# Patient Record
Sex: FEMALE | Race: WHITE | Employment: FULL TIME | URBAN - METROPOLITAN AREA
[De-identification: names, ages, dates, MRNs, and addresses within clinical notes are randomized per-mention and may not be internally consistent; named-entity substitution may affect disease eponyms.]

---

## 2017-08-28 ENCOUNTER — OFFICE VISIT (OUTPATIENT)
Dept: OBGYN CLINIC | Age: 47
End: 2017-08-28
Payer: COMMERCIAL

## 2017-08-28 VITALS
SYSTOLIC BLOOD PRESSURE: 118 MMHG | DIASTOLIC BLOOD PRESSURE: 76 MMHG | HEIGHT: 68 IN | WEIGHT: 135 LBS | HEART RATE: 80 BPM | BODY MASS INDEX: 20.46 KG/M2

## 2017-08-28 DIAGNOSIS — Z01.419 WELL WOMAN EXAM: Primary | ICD-10-CM

## 2017-08-28 PROBLEM — Z00.00 ENCOUNTER FOR WELL ADULT EXAM WITHOUT ABNORMAL FINDINGS: Status: ACTIVE | Noted: 2017-08-28

## 2017-08-28 PROBLEM — Z28.21 REFUSED INFLUENZA VACCINE: Status: ACTIVE | Noted: 2017-08-28

## 2017-08-28 PROCEDURE — 99396 PREV VISIT EST AGE 40-64: CPT | Performed by: NURSE PRACTITIONER

## 2018-09-26 PROBLEM — Z00.00 ENCOUNTER FOR WELL ADULT EXAM WITHOUT ABNORMAL FINDINGS: Status: RESOLVED | Noted: 2017-08-28 | Resolved: 2018-09-26

## 2019-04-10 ENCOUNTER — OFFICE VISIT (OUTPATIENT)
Dept: OBGYN CLINIC | Age: 49
End: 2019-04-10
Payer: COMMERCIAL

## 2019-04-10 ENCOUNTER — HOSPITAL ENCOUNTER (OUTPATIENT)
Age: 49
Setting detail: SPECIMEN
Discharge: HOME | End: 2019-04-10
Payer: COMMERCIAL

## 2019-04-10 VITALS
HEIGHT: 68 IN | BODY MASS INDEX: 21.07 KG/M2 | WEIGHT: 139 LBS | DIASTOLIC BLOOD PRESSURE: 70 MMHG | RESPIRATION RATE: 18 BRPM | HEART RATE: 78 BPM | SYSTOLIC BLOOD PRESSURE: 114 MMHG

## 2019-04-10 DIAGNOSIS — Z01.419 WELL FEMALE EXAM WITH ROUTINE GYNECOLOGICAL EXAM: Primary | ICD-10-CM

## 2019-04-10 DIAGNOSIS — Z11.51 SPECIAL SCREENING EXAMINATION FOR HUMAN PAPILLOMAVIRUS (HPV): ICD-10-CM

## 2019-04-10 DIAGNOSIS — Z12.31 SCREENING MAMMOGRAM, ENCOUNTER FOR: ICD-10-CM

## 2019-04-10 DIAGNOSIS — Z12.39 SCREENING FOR BREAST CANCER: ICD-10-CM

## 2019-04-10 DIAGNOSIS — Z01.419 WELL FEMALE EXAM WITH ROUTINE GYNECOLOGICAL EXAM: ICD-10-CM

## 2019-04-10 DIAGNOSIS — Z80.3 FAMILY HISTORY OF BREAST CANCER IN FEMALE: ICD-10-CM

## 2019-04-10 DIAGNOSIS — R92.2 DENSE BREAST TISSUE ON MAMMOGRAM: ICD-10-CM

## 2019-04-10 PROCEDURE — 87624 HPV HI-RISK TYP POOLED RSLT: CPT

## 2019-04-10 PROCEDURE — 99396 PREV VISIT EST AGE 40-64: CPT | Performed by: NURSE PRACTITIONER

## 2019-04-10 PROCEDURE — G0145 SCR C/V CYTO,THINLAYER,RESCR: HCPCS

## 2019-04-10 ASSESSMENT — PATIENT HEALTH QUESTIONNAIRE - PHQ9
SUM OF ALL RESPONSES TO PHQ QUESTIONS 1-9: 0
SUM OF ALL RESPONSES TO PHQ9 QUESTIONS 1 & 2: 0
SUM OF ALL RESPONSES TO PHQ QUESTIONS 1-9: 0
2. FEELING DOWN, DEPRESSED OR HOPELESS: 0
1. LITTLE INTEREST OR PLEASURE IN DOING THINGS: 0

## 2019-04-10 NOTE — PROGRESS NOTES
History and Physical  [address], [address] [facility] [address], [place] [facility] [place] [address]. [phone]   Fax [phone]  Vikas Sadler  4/10/2019              51 y.o. Chief Complaint   Patient presents with    Annual Exam       Patient's last menstrual period was 2019. Primary Care Physician: Marcell Boyer MD    The patient was seen and examined. She has no chief complaint today and is here for her annual exam.  Her bowels are regular. There are no voiding complaints. She denies any bloating. She denies vaginal discharge and was counseled on STD's and the need for barrier contraception. HPI : Vikas Sadler is a 51 y.o. female [id number]    Annual exam  Periods are starting to become irregular- periods are usually every 4 weeks, and has been every 3-4 weeks instead. Denies heavy menses. Denies further menopausal symptoms. ________________________________________________________________________  OB History    Para Term  AB Living   2 2 2 0 0 2   SAB TAB Ectopic Molar Multiple Live Births   0 0 0 0 0 1      # Outcome Date GA Lbr Rajendra/2nd Weight Sex Delivery Anes PTL Lv   2 Term    7 lb 13 oz (3.544 kg) F Vag-Spont      1 Term    7 lb 3 oz (3.26 kg) F Vag-Spont   KEYANA     History reviewed. No pertinent past medical history. History reviewed. No pertinent surgical history.   Family History   Problem Relation Age of Onset    Breast Cancer Mother 59        ductal carcinoma lumpectomy w/ radiation     Social History     Socioeconomic History    Marital status: Single     Spouse name: Not on file    Number of children: Not on file    Years of education: Not on file    Highest education level: Not on file   Occupational History    Not on file   Social Needs    Financial resource strain: Not on file    Food insecurity:     Worry: Not on file     Inability: Not on file   Sean Crespo Transportation needs:     Medical: Not on file     Non-medical: Not on file   Tobacco Use    Smoking status: Never Smoker    Smokeless tobacco: Never Used   Substance and Sexual Activity    Alcohol use: No    Drug use: No    Sexual activity: Yes     Partners: Male   Lifestyle    Physical activity:     Days per week: Not on file     Minutes per session: Not on file    Stress: Not on file   Relationships    Social connections:     Talks on phone: Not on file     Gets together: Not on file     Attends [employer] service: Not on file     Active member of club or organization: Not on file     Attends meetings of clubs or organizations: Not on file     Relationship status: Not on file    Intimate partner violence:     Fear of current or ex partner: Not on file     Emotionally abused: Not on file     Physically abused: Not on file     Forced sexual activity: Not on file   Other Topics Concern    Not on file   Social History Narrative    Not on file       MEDICATIONS:  No current outpatient medications on file. No current facility-administered medications for this visit. ALLERGIES:  Allergies as of 04/10/2019    (No Known Allergies)       Symptoms of decreased mood absent  Symptoms of anhedonia absent    **If either question is answered in a  positive fashion then complete the PHQ9 Scoring Evaluation and make the appropriate referral**      Immunization status: stated as current, but no records available. Gynecologic History:  Menarche: 14 yo  Menopause at [address] [address] [place] yo     Patient's last menstrual period was 03/31/2019. Sexually Active: Yes    STD History: No     Permanent Sterilization: No   Reversible Birth Control: No        Hormone Replacement Exposure: No      Genetic Qualified Family History of Breast, Ovarian , Colon or Uterine Cancer: Yes (patient's mother with breast cancer- 62s, paternal grandmother with breast cancer in her [de-identified]). Declines testing. Information on myrisk given.      If YES see scanned worksheet. Preventative Health Testing:    Health Maintenance:  Health Maintenance Due   Topic Date Due    Lipid screen  11/16/2009    Breast cancer screen  01/03/2019    Cervical cancer screen  03/13/2019       Date of Last Pap Smear: 3/8/2016 neg/neg  Abnormal Pap Smear History: denies  Colposcopy History:   Date of Last Mammogram: 1/3/2018 negative- dense breast.  Date of Last Colonoscopy:   Date of Last Bone Density:      ________________________________________________________________________        REVIEW OF SYSTEMS:    yes   A minimum of an eleven point review of systems was completed. Review Of Systems (11 point):  Constitutional: No fever, chills or malaise; No weight change or fatigue  Head and Eyes: No vision, Headache, Dizziness or trauma in last 12 months  ENT ROS: No hearing, Tinnitis, sinus or taste problems  Hematological and Lymphatic ROS:No Lymphoma, Von Willebrand's, Hemophillia or Bleeding History  Psych ROS: No Depression, Homicidal thoughts,suicidal thoughts, or anxiety  Breast ROS: No prior breast abnormalities or lumps  Respiratory ROS: No SOB, Pneumoniae,Cough, or Pulmonary Embolism History  Cardiovascular ROS: No Chest Pain with Exertion, Palpitations, Syncope, Edema, Arrhythmia  Gastrointestinal ROS: No Indigestion, Heartburn, Nausea, vomiting, Diarrhea, Constipation,or Bowel Changes; No Bloody Stools or melena  Genito-Urinary ROS: No Dysuria, Hematuria or Nocturia.  No Urinary Incontinence or Vaginal Discharge  Musculoskeletal ROS: No Arthralgia, Arthritis,Gout,Osteoporosis or Rheumatism  Neurological ROS: No CVA, Migraines, Epilepsy, Seizure Hx, or Limb Weakness  Dermatological ROS: No Rash, Itching, Hives, Mole Changes or Cancer                                                                                                                                                                                                                                  PHYSICAL Exam:     Constitutional:  Vitals:    04/10/19 1042   BP: 114/70   Site: Left Upper Arm   Position: Sitting   Cuff Size: Medium Adult   Pulse: 78   Resp: 18   Weight: 139 lb (63 kg)   Height: 5' 8\" (1.727 m)         General Appearance: This  is a well Developed, well Nourished, well groomed female. Her BMI was reviewed. Nutritional decision making was discussed. Skin:  There was a Normal Inspection of the skin without rashes or lesions. There were no rashes. (Papular, Maculopapular, Hives, Pustular, Macular)     There were no lesions (Ulcers, Erythema, Abn. Appearing Nevi)            Lymphatic:  No Lymph Nodes were Palpable in the neck , axilla or groin.  0 # Of Lymph Nodes; Location ; Character [Normal]  [Shotty] [Tender] [Enlarged]     Neck and EENT:  The neck was supple. There were no masses   The thyroid was not enlarged and had no masses. Perrla, EOMI B/L, TMI B/L No Abnormalities. Throat inspected-No exudates or Masses, Nares Patent No Masses        Respiratory: The lungs were auscultated and found to be clear. There were no rales, rhonchi or wheezes. There was a good respiratory effort. Cardiovascular: The heart was in a regular rate and rhythm. . No S3 or S4. There was no murmur appreciated. Location, grade, and radiation are not applicable. Extremities: The patients extremities were without calf tenderness, edema, or varicosities. There was full range of motion in all four extremities. Pulses in all four extremities were appreciated and are 2/4. Abdomen: The abdomen was soft and non-tender. There were good bowel sounds in all quadrants and there was no guarding, rebound or rigidity. On evaluation there was no evidence of hepatosplenomegaly and there was no costal vertebral johanna tenderness bilaterally. No hernias were appreciated. Abdominal Scars: intact    Psych:   The patient had a normal Orientation to: Time, Place, Person, and Situation  There is no Mood / Affect changes    Breast:  (Chest)  normal appearance, no masses or tenderness  Self breast exams were reviewed in detail. Literature was given. Pelvic Exam:  Vulva and vagina appear normal. Bimanual exam reveals normal uterus and adnexa. Rectal Exam:  exam declined by patient          Musculosk:  Normal Gait and station was noted. Digits were evaluated without abnormal findings. Range of motion, stability and strength were evaluated and found to be appropriate for the patients age. ASSESSMENT:      51 y.o. Annual   Diagnosis Orders   1. Well female exam with routine gynecological exam  BELINDA DIGITAL SCREEN W CAD BILATERAL    PAP SMEAR   2. Screening mammogram, encounter for  BELINDA DIGITAL SCREEN W CAD BILATERAL   3. Family history of breast cancer in female  MRI BREAST BILATERAL W WO CONTRAST   4. Dense breast tissue on mammogram  MRI BREAST BILATERAL W WO CONTRAST   5. Screening for breast cancer  BELINDA DIGITAL SCREEN W CAD BILATERAL    MRI BREAST BILATERAL W WO CONTRAST   6. Special screening examination for human papillomavirus (HPV)  PAP SMEAR          Chief Complaint   Patient presents with    Annual Exam          History reviewed. No pertinent past medical history. Patient Active Problem List    Diagnosis Date Noted    Body mass index (BMI) 20.0-20.9, adult 08/28/2017    Refused influenza vaccine 08/28/2017          Hereditary Breast, Ovarian, Colon and Uterine Cancer screening Done. Tobacco & Secondary smoke risks reviewed; instructed on cessation and avoidance      Counseling Completed:  Preventative Health Recommendations and Follow up. The patient was informed of the recommended preventative health recommendations. 1. Annuals every year; Cytology collections per prevailing guidelines. 2. Mammograms begin every year at 37 yo if no abnormalities are found and no family     History. 3. Bone density studies every 2-3 years. Begin at 71 yo.  If no fracture history or osteoporosis family history. (significant). 4. Colonoscopy begin at 38 yo. Repeat every ten years if negative and no family history. 5. Calcium of 9625-3377 mg/day in split dosing  6. Vitamin D 400-800 IU/day  7. All other preventative health recommendations will be managed by the patients Primary care physician. PLAN:  Return in about 1 year (around 4/10/2020) for annual.   Pap smear obtained. Mammogram and Screening MRI ordered- recommendations noted on last mammogram due to increased risk. Declines genetic screening. Repeat Annual every 1 year  Cervical Cytology Evaluation begins at 24years old. If Negative Cytology, Follow-up screening per current guidelines. Mammograms every 1 year. If 37 yo and last mammogram was negative. Calcium and Vitamin D dosing reviewed. Colonoscopy screening reviewed as well as onset for bone density testing. Birth control and barrier recommendations discussed. STD counseling and prevention reviewed. Gardisil counseling completed for all patients 10-37 yo. Routine health maintenance per patients PCP. Orders Placed This Encounter   Procedures    BELINDA DIGITAL SCREEN W CAD BILATERAL     Standing Status:   Future     Standing Expiration Date:   6/10/2020     Order Specific Question:   Reason for exam:     Answer:   SCREENING    MRI BREAST BILATERAL W WO CONTRAST     Standing Status:   Future     Standing Expiration Date:   4/10/2020     Scheduling Instructions:      Schedule in 6 months. Order Specific Question:   Reason for exam:     Answer:   screening for breast cancer, bilateral dense breast tissue, family hx of breast cancer    PAP SMEAR     Patient History:    Patient's last menstrual period was 03/31/2019. OBGYN Status: Having periods  History reviewed. No pertinent surgical history.       Social History    Tobacco Use      Smoking status: Never Smoker      Smokeless tobacco: Never Used       Standing Status:   Future     Standing Expiration Date:   4/10/2020 Order Specific Question:   Collection Type     Answer: Thin Prep     Order Specific Question:   Prior Abnormal Pap Test     Answer:   No     Order Specific Question:   Screening or Diagnostic     Answer:   Screening     Order Specific Question:   HPV Requested?      Answer:   Yes     Order Specific Question:   High Risk Patient     Answer:   N/A

## 2019-04-12 LAB
HPV SAMPLE: NORMAL
HPV, GENOTYPE 16: NOT DETECTED
HPV, GENOTYPE 18: NOT DETECTED
HPV, HIGH RISK OTHER: NOT DETECTED
HPV, INTERPRETATION: NORMAL
SPECIMEN DESCRIPTION: NORMAL

## 2019-04-18 LAB — CYTOLOGY REPORT: NORMAL

## 2019-04-24 ENCOUNTER — HOSPITAL ENCOUNTER (OUTPATIENT)
Dept: WOMENS IMAGING | Age: 49
Discharge: HOME | End: 2019-04-26
Payer: COMMERCIAL

## 2019-04-24 DIAGNOSIS — Z12.31 SCREENING MAMMOGRAM, ENCOUNTER FOR: ICD-10-CM

## 2019-04-24 DIAGNOSIS — Z12.39 SCREENING FOR BREAST CANCER: ICD-10-CM

## 2019-04-24 DIAGNOSIS — Z01.419 WELL FEMALE EXAM WITH ROUTINE GYNECOLOGICAL EXAM: ICD-10-CM

## 2019-04-24 PROCEDURE — 77063 BREAST TOMOSYNTHESIS BI: CPT

## 2021-04-15 ENCOUNTER — OFFICE VISIT (OUTPATIENT)
Dept: OBGYN CLINIC | Age: 51
End: 2021-04-15
Payer: COMMERCIAL

## 2021-04-15 VITALS
TEMPERATURE: 97.3 F | DIASTOLIC BLOOD PRESSURE: 72 MMHG | HEIGHT: 68 IN | BODY MASS INDEX: 21.22 KG/M2 | SYSTOLIC BLOOD PRESSURE: 114 MMHG | WEIGHT: 140 LBS

## 2021-04-15 DIAGNOSIS — Z12.31 ENCOUNTER FOR SCREENING MAMMOGRAM FOR MALIGNANT NEOPLASM OF BREAST: Primary | ICD-10-CM

## 2021-04-15 PROCEDURE — 99396 PREV VISIT EST AGE 40-64: CPT | Performed by: NURSE PRACTITIONER

## 2021-04-15 NOTE — PROGRESS NOTES
History and Physical  [address], [address] [facility] [address], [address] [address] [phone]   Fax [phone]  Majo Mccarty  4/15/2021              45 y.o. Chief Complaint   Patient presents with    Annual Exam       Patient's last menstrual period was 2021. Primary Care Physician: Tacho Faust MD    The patient was seen and examined. She has no chief complaint today and is here for her annual exam.  Her bowels are regular. There are no voiding complaints. She denies any bloating. She denies vaginal discharge and was counseled on STD's and the need for barrier contraception. HPI : Majo Mccarty is a 45 y.o. female     Annual exam  No chief complaint  ________________________________________________________________________  OB History    Para Term  AB Living   2 2 2 0 0 2   SAB TAB Ectopic Molar Multiple Live Births   0 0 0 0 0 1      # Outcome Date GA Lbr Rajendra/2nd Weight Sex Delivery Anes PTL Lv   2 Term    7 lb 13 oz (3.544 kg) F Vag-Spont      1 Term    7 lb 3 oz (3.26 kg) F Vag-Spont   KEYANA     History reviewed. No pertinent past medical history. History reviewed. No pertinent surgical history.   Family History   Problem Relation Age of Onset    Other Father         Gout    Breast Cancer Mother 59        ductal carcinoma lumpectomy w/ radiation   Wilhelminia Blazing Migraines Mother     No Known Problems Brother     Breast Cancer Paternal Grandmother [de-identified]    Heart Attack Maternal Grandfather 76     Social History     Socioeconomic History    Marital status: Single     Spouse name: Not on file    Number of children: Not on file    Years of education: Not on file    Highest education level: Not on file   Occupational History    Not on file   Social Needs    Financial resource strain: Not on file    Food insecurity     Worry: Not on file     Inability: Not on Due    Hepatitis C screen  Never done    HIV screen  Never done    Lipid screen  Never done    Shingles Vaccine (1 of 2) Never done    Breast cancer screen  04/24/2020    Colon Cancer Screen FIT/FOBT  03/18/2021       Date of Last Pap Smear: 4/10/2019 neg/neg  Abnormal Pap Smear History: denies  Colposcopy History:   Date of Last Mammogram: 4/24/2019 normal  Date of Last Colonoscopy:   Date of Last Bone Density:      ________________________________________________________________________        REVIEW OF SYSTEMS:    yes   A minimum of an eleven point review of systems was completed. Review Of Systems (11 point):  Constitutional: No fever, chills or malaise; No weight change or fatigue  Head and Eyes: No vision, Headache, Dizziness or trauma in last 12 months  ENT ROS: No hearing, Tinnitis, sinus or taste problems  Hematological and Lymphatic ROS:No Lymphoma, Von Willebrand's, Hemophillia or Bleeding History  Psych ROS: No Depression, Homicidal thoughts,suicidal thoughts, or anxiety  Breast ROS: No prior breast abnormalities or lumps  Respiratory ROS: No SOB, Pneumoniae,Cough, or Pulmonary Embolism History  Cardiovascular ROS: No Chest Pain with Exertion, Palpitations, Syncope, Edema, Arrhythmia  Gastrointestinal ROS: No Indigestion, Heartburn, Nausea, vomiting, Diarrhea, Constipation,or Bowel Changes; No Bloody Stools or melena  Genito-Urinary ROS: No Dysuria, Hematuria or Nocturia.  No Urinary Incontinence or Vaginal Discharge  Musculoskeletal ROS: No Arthralgia, Arthritis,Gout,Osteoporosis or Rheumatism  Neurological ROS: No CVA, Migraines, Epilepsy, Seizure Hx, or Limb Weakness  Dermatological ROS: No Rash, Itching, Hives, Mole Changes or Cancer                                                                                                                                                                                                                                  PHYSICAL Exam: none    Psych: The patient had a normal Orientation to: Time, Place, Person, and Situation  There is no Mood / Affect changes    Breast:  (Chest)  normal appearance, no masses or tenderness  Self breast exams were reviewed in detail. Literature was given. Pelvic Exam:  Vulva and vagina appear normal. Bimanual exam reveals normal uterus and adnexa. Rectal Exam:  exam declined by patient          Musculosk:  Normal Gait and station was noted. Digits were evaluated without abnormal findings. Range of motion, stability and strength were evaluated and found to be appropriate for the patients age. ASSESSMENT:      45 y.o. Annual   Diagnosis Orders   1. Encounter for screening mammogram for malignant neoplasm of breast  BELINDA DIGITAL SCREEN W OR WO CAD BILATERAL          Chief Complaint   Patient presents with    Annual Exam          History reviewed. No pertinent past medical history. Patient Active Problem List    Diagnosis Date Noted    Body mass index (BMI) 20.0-20.9, adult 08/28/2017    Refused influenza vaccine 08/28/2017          Hereditary Breast, Ovarian, Colon and Uterine Cancer screening Done. Tobacco & Secondary smoke risks reviewed; instructed on cessation and avoidance      Counseling Completed:  Preventative Health Recommendations and Follow up. The patient was informed of the recommended preventative health recommendations. 1. Annuals every year; Cytology collections per prevailing guidelines. 2. Mammograms begin every year at 37 yo if no abnormalities are found and no family history. 3. Bone density studies every 2-3 years. Begin at 71 yo. If no fracture history or osteoporosis family history. (significant). 4. Colonoscopy begin at 40 yo. Repeat every ten years if negative and no family history. 5. Calcium of 8835-9574 mg/day in split dosing  6. Vitamin D 400-800 IU/day  7.  All other preventative health recommendations will be managed by the patients Primary care physician. PLAN:  Return in about 1 year (around 4/15/2022) for annual.   Mammogram ordered  PCP ordering Cologaurd   Repeat Annual every 1 year  Cervical Cytology Evaluation begins at 24years old. If Negative Cytology, Follow-up screening per current guidelines. Mammograms every 1 year. If 35 yo and last mammogram was negative. Calcium and Vitamin D dosing reviewed. Colonoscopy screening reviewed as well as onset for bone density testing. Birth control and barrier recommendations discussed. STD counseling and prevention reviewed. Gardisil counseling completed for all patients 10-35 yo. Routine health maintenance per patients PCP. Orders Placed This Encounter   Procedures    BELINDA DIGITAL SCREEN W OR WO CAD BILATERAL     Standing Status:   Future     Standing Expiration Date:   6/15/2022     Order Specific Question:   Reason for exam:     Answer:   screening mammogram           The patient, Tiny Noble is a 45 y.o. female, was seen with a total time spent of 30 minutes for the visit on this date of service by the E/M provider. The time component had both face to face and non face to face time spent in determining the total time component. Counseling and education regarding her diagnosis listed below and her options regarding those diagnoses were also included in determining her time component. Diagnosis Orders   1. Encounter for screening mammogram for malignant neoplasm of breast  BELINDA DIGITAL SCREEN W OR WO CAD BILATERAL        The patient had her preventative health maintenance recommendations and follow-up reviewed with her at the completion of her visit.

## 2021-05-13 ENCOUNTER — HOSPITAL ENCOUNTER (OUTPATIENT)
Dept: WOMENS IMAGING | Age: 51
Discharge: HOME | End: 2021-05-15
Payer: COMMERCIAL

## 2021-05-13 DIAGNOSIS — Z12.31 ENCOUNTER FOR SCREENING MAMMOGRAM FOR MALIGNANT NEOPLASM OF BREAST: ICD-10-CM

## 2021-05-13 PROCEDURE — 77063 BREAST TOMOSYNTHESIS BI: CPT

## 2021-05-14 ENCOUNTER — TELEPHONE (OUTPATIENT)
Dept: OBGYN CLINIC | Age: 51
End: 2021-05-14

## 2021-05-14 DIAGNOSIS — Z80.3 FAMILY HISTORY OF BREAST CANCER IN MOTHER: ICD-10-CM

## 2021-05-14 DIAGNOSIS — N64.89 BREAST ASYMMETRY IN FEMALE: Primary | ICD-10-CM

## 2021-05-14 DIAGNOSIS — R92.2 DENSE BREAST TISSUE ON MAMMOGRAM: ICD-10-CM

## 2021-05-14 NOTE — TELEPHONE ENCOUNTER
Per kyle pt notified of abnormal mammogram and recommendations for diagnostic left breast mammogram followed by left breast US. Pt with increased lifetime risk of breast cancer and recommendation for alternating mammogram/breast MRI every 6 month with referral to genetic counseling. Pt accepted offer to [facility] high risk breast clinic and referral has been sent. Pt to check with insurance company regarding breast MRI. Orders in epic.

## 2021-05-14 NOTE — TELEPHONE ENCOUNTER
----- Message from RIKKI Alarcon NP sent at 5/14/2021  9:46 AM EDT -----  Asymmetry seen in left breast  Left breast u/s to be ordered    Dense breast tissue  Increased lifetime risk of breast cancer is 24.4%  Recommended alternating mammogram/ MRI every 6 months  Please refer to Genetic Counseling

## 2021-05-14 NOTE — TELEPHONE ENCOUNTER
----- Message from RIKKI Carvajal NP sent at 5/14/2021  9:46 AM EDT -----  Asymmetry seen in left breast  Left breast u/s to be ordered    Dense breast tissue  Increased lifetime risk of breast cancer is 24.4%  Recommended alternating mammogram/ MRI every 6 months  Please refer to Genetic Counseling

## 2021-05-14 NOTE — TELEPHONE ENCOUNTER
LM for pt to call office regarding mammogram results. Pt will need additional imaging with left breast US. Pt with increased lifetime risk of breast cancer and will nee alternating mammogram/MRI every 6 months.   Referral to genetic counseling to be sent if patient accepts referral.

## 2021-05-26 ENCOUNTER — TELEPHONE (OUTPATIENT)
Dept: OBGYN CLINIC | Age: 51
End: 2021-05-26

## 2021-05-26 ENCOUNTER — HOSPITAL ENCOUNTER (OUTPATIENT)
Dept: WOMENS IMAGING | Age: 51
Discharge: HOME | End: 2021-05-28
Payer: COMMERCIAL

## 2021-05-26 DIAGNOSIS — R92.2 DENSE BREAST TISSUE ON MAMMOGRAM: ICD-10-CM

## 2021-05-26 DIAGNOSIS — Z80.3 FAMILY HISTORY OF BREAST CANCER IN MOTHER: ICD-10-CM

## 2021-05-26 DIAGNOSIS — Z80.3 FAMILY HISTORY OF BREAST CANCER IN FIRST DEGREE RELATIVE: ICD-10-CM

## 2021-05-26 DIAGNOSIS — N64.89 BREAST ASYMMETRY IN FEMALE: ICD-10-CM

## 2021-05-26 DIAGNOSIS — N63.20 LEFT BREAST MASS: Primary | ICD-10-CM

## 2021-05-26 PROCEDURE — 76642 ULTRASOUND BREAST LIMITED: CPT

## 2021-05-26 PROCEDURE — G0279 TOMOSYNTHESIS, MAMMO: HCPCS

## 2021-05-26 NOTE — TELEPHONE ENCOUNTER
Per Debbie Espana pt notified of abnormal mammogram/ left breast US results showing BIRADS 5=highly suggestive for malignancy and pt will need Ultrsound guided breast biopsy for left breast mass. Per pt request left breast biopsy ordered to be completed at [facility]. Pt declining general surgery referral at this time. Order in [facility] to schedule patient for Left breast biopsy.

## 2021-05-26 NOTE — TELEPHONE ENCOUNTER
----- Message from RIKKI Park CNP sent at 5/26/2021 10:19 AM EDT -----  Spiculated mass noted in left breast  Left axillary lymph node with focal cortical thickening noted  Ultrasound guided biopsy recommended of breast mass  BIRADS 5=highly suggestive for malignancy.   Please send stat referral to general surgeon for breast biopsy

## 2021-05-26 NOTE — TELEPHONE ENCOUNTER
----- Message from RIKKI Wallace CNP sent at 5/26/2021 10:19 AM EDT -----  Spiculated mass noted in left breast  Left axillary lymph node with focal cortical thickening noted  Ultrasound guided biopsy recommended of breast mass  BIRADS 5=highly suggestive for malignancy.   Please send stat referral to general surgeon for breast biopsy

## 2021-05-28 DIAGNOSIS — R59.9 ENLARGED LYMPH NODE: ICD-10-CM

## 2021-05-28 DIAGNOSIS — N63.20 LEFT BREAST MASS: Primary | ICD-10-CM

## 2021-06-03 ENCOUNTER — HOSPITAL ENCOUNTER (OUTPATIENT)
Dept: WOMENS IMAGING | Age: 51
Discharge: HOME | End: 2021-06-05
Payer: COMMERCIAL

## 2021-06-03 VITALS — DIASTOLIC BLOOD PRESSURE: 66 MMHG | HEART RATE: 80 BPM | SYSTOLIC BLOOD PRESSURE: 102 MMHG

## 2021-06-03 DIAGNOSIS — R92.8 ABNORMAL MAMMOGRAM: ICD-10-CM

## 2021-06-03 DIAGNOSIS — N63.20 LEFT BREAST MASS: ICD-10-CM

## 2021-06-03 DIAGNOSIS — R59.9 ENLARGED LYMPH NODE: ICD-10-CM

## 2021-06-03 PROCEDURE — 88360 TUMOR IMMUNOHISTOCHEM/MANUAL: CPT

## 2021-06-03 PROCEDURE — 77065 DX MAMMO INCL CAD UNI: CPT

## 2021-06-03 PROCEDURE — 88305 TISSUE EXAM BY PATHOLOGIST: CPT

## 2021-06-03 PROCEDURE — 88377 M/PHMTRC ALYS ISHQUANT/SEMIQ: CPT

## 2021-06-03 PROCEDURE — 38505 NEEDLE BIOPSY LYMPH NODES: CPT

## 2021-06-03 PROCEDURE — 19083 BX BREAST 1ST LESION US IMAG: CPT

## 2021-06-03 PROCEDURE — 88342 IMHCHEM/IMCYTCHM 1ST ANTB: CPT

## 2021-06-04 LAB — SURGICAL PATHOLOGY REPORT: NORMAL

## 2021-06-07 ENCOUNTER — HOSPITAL ENCOUNTER (OUTPATIENT)
Dept: WOMENS IMAGING | Age: 51
Discharge: HOME | End: 2021-06-09
Payer: COMMERCIAL

## 2021-06-07 NOTE — RESULT ENCOUNTER NOTE
Pt scheduled with [facility] [place] [facility] 6/7/21 @ 4:00 pm to discuss results and recommendations.

## 2021-06-08 ENCOUNTER — TELEPHONE (OUTPATIENT)
Dept: ONCOLOGY | Age: 51
End: 2021-06-08

## 2021-06-08 DIAGNOSIS — Z17.0 MALIGNANT NEOPLASM OF LEFT BREAST IN FEMALE, ESTROGEN RECEPTOR POSITIVE, UNSPECIFIED SITE OF BREAST (HCC): Primary | ICD-10-CM

## 2021-06-08 DIAGNOSIS — C50.912 MALIGNANT NEOPLASM OF LEFT BREAST IN FEMALE, ESTROGEN RECEPTOR POSITIVE, UNSPECIFIED SITE OF BREAST (HCC): Primary | ICD-10-CM

## 2021-06-08 NOTE — TELEPHONE ENCOUNTER
Late entry from 6/7/21 1600 meeting: The navigation team of myself and Jose Manuel Saldaña along with Dr. Janak Davis met with Cherylene Ring. Cherylene Ring relates that she saw results in her mychart. We confirmed with her that there is a diagnosis of breast cancer. Dr. Janak Davis reviewed the pathology with Cherylene Ring. I discussed with Cherylene Ring and her  my role as her nurse navigator and how I will be here to help her navigate the health care system, the diagnosis and her treatments. Encouraged her and family's questions. Discussed pathology report, treatment options that may be offered, types of physicians that may make up her oncology team.  Pt relates she does not have an established relationship with a surgeon or med onc. She has not been recommended anyone specific per friends or family. Offered pt the Breast clinic where she can meet with a surgeon, medical oncologist and radiation oncologist all at one appointment to discuss her options for treatment. Cherylene Ring is requesting to see Dr. Russ Villegas as her surgeon. Explained that I will set up a separate appointment with her office and she will see the rest of the clinic team on 6/18/21. Pt is agreeable to that. Explained what to expect day of clinic. Gave pt appointment date and time and list of Doctors she will be seeing. Gave pt my name and phone number and encouraged her to call with questions or concerns. Reassurance and support offered throughout the meeting. Answered questions, explained the various members of the oncology care team, encouraged her to write down questions and bring to appointment, encouraged her to bring a friend or family member to appointments as another set of ears. Let her know I will contact Dr. Kamron Leyva office and see about getting her an appointment with her in the [facility] office. 6/8/21:  Updated obgyn office of plan for breast clinic appointments on 6/18/21.   Staff relates that provider is agreeable with patient going to [facility]

## 2021-06-08 NOTE — TELEPHONE ENCOUNTER
Riannajosewaldemar Vogel called and notified me that her last menstrual period started June 1. The time frame to do the breast mri would be 6/11 through 6/18th. I contacted Dr. Zeferino To per perfect serve and let her know what was going on and that radiologist was recommending breast mri be done. Dr. Zeferino To requests order fro, breast mri be placed and scheduled. Order placed  Called patient back to let her know where to call to schedule. If she wants me to schedule for her I need to ask her some medical history questions so I asked her to call me back and let me know how she would like to proceed. Pt on pending.

## 2021-06-09 ENCOUNTER — TELEPHONE (OUTPATIENT)
Dept: ONCOLOGY | Age: 51
End: 2021-06-09

## 2021-06-09 LAB — SURGICAL PATHOLOGY REPORT: NORMAL

## 2021-06-09 NOTE — TELEPHONE ENCOUNTER
Spoke with Wilfredo Hernandez she is scheduled for her MRI on 6/18//21. Questions answered. Pt on pending.

## 2021-06-16 ENCOUNTER — OFFICE VISIT (OUTPATIENT)
Dept: SURGERY | Age: 51
End: 2021-06-16
Payer: COMMERCIAL

## 2021-06-16 VITALS
WEIGHT: 142 LBS | RESPIRATION RATE: 12 BRPM | DIASTOLIC BLOOD PRESSURE: 72 MMHG | OXYGEN SATURATION: 98 % | SYSTOLIC BLOOD PRESSURE: 108 MMHG | BODY MASS INDEX: 21.59 KG/M2 | HEART RATE: 71 BPM

## 2021-06-16 DIAGNOSIS — C50.912 BREAST CARCINOMA, FEMALE, LEFT (HCC): Primary | ICD-10-CM

## 2021-06-16 PROCEDURE — 99203 OFFICE O/P NEW LOW 30 MIN: CPT | Performed by: PLASTIC SURGERY

## 2021-06-16 NOTE — PROGRESS NOTES
[address] [place] [address] SURGICAL SPECIALISTS  [facility] [id number]       History and Physical     Chief Complaint   Patient presents with    New Patient     Bilateral breast lumpectomy         HPI:   Teddy Ott is a 45 y.o. female who presents with biopsy-proven breast cancer on the left side. The abnormality is located in the upper outer quadrant at about 1230 to 1 o'clock position. The mass is approximately 1.7 x 1.3 cm. Patient is planning to have a lumpectomy. Patient is here to discuss her reconstructive options. .  Medications:     No current outpatient medications on file. No current facility-administered medications for this visit. Allergies:   No Known Allergies  Review of Systems:   Constitutional: Negative for fever, chills, fatigue and unexpected weight change. HENT: Negative for hearing loss, sore throat and facial swelling. Eyes: Negative for pain and discharge. Respiratory: Negative for cough and shortness of breath. Cardiovascular: Negative for chest pain. Gastrointestinal: Negative for nausea, vomiting, diarrhea and constipation. Skin: Negative for pallor and rash. Neurological: Negative for seizures, syncope and numbness. Hematological: Does not bruise/bleed easily. Psychiatric/Behavioral: Negative for behavioral problems. The patient is not nervous/anxious. No past medical history on file.   Past Surgical History:   Procedure Laterality Date    US BREAST NEEDLE BIOPSY LEFT Left 6/3/2021    US BREAST NEEDLE BIOPSY LEFT 6/3/2021 RONI [facility]ai [address]    US LYMPH NODE BIOPSY  6/3/2021    US LYMPH NODE BIOPSY 6/3/2021 RONI Desai 879     Social History     Socioeconomic History    Marital status:      Spouse name: Not on file    Number of children: Not on file    Years of education: Not on file    Highest education level: Not on file   Occupational History    Not on file   Tobacco Use    Smoking status: Never Smoker    Smokeless tobacco: Never Used   Substance and Sexual Activity    Alcohol use: No    Drug use: No    Sexual activity: Yes     Partners: Male   Other Topics Concern    Not on file   Social History Narrative    Not on file     Social Determinants of Health     Financial Resource Strain:     Difficulty of Paying Living Expenses:    Food Insecurity:     Worried About Running Out of Food in the Last Year:     [address] in the Last Year:    Transportation Needs:     Lack of Transportation (Medical):  Lack of Transportation (Non-Medical):    Physical Activity:     Days of Exercise per Week:     Minutes of Exercise per Session:    Stress:     Feeling of Stress :    Social Connections:     Frequency of Communication with Friends and Family:     Frequency of Social Gatherings with Friends and Family:     Attends [employer] Services:     Active Member of Clubs or Organizations:     Attends Club or Organization Meetings:     Marital Status:    Intimate Partner Violence:     Fear of Current or Ex-Partner:     Emotionally Abused:     Physically Abused:     Sexually Abused:      Family History   Problem Relation Age of Onset    Other Father         Gout    Breast Cancer Mother 59        ductal carcinoma lumpectomy w/ radiation   Aaron Riddles Migraines Mother     No Known Problems Brother     Breast Cancer Paternal Grandmother [de-identified]    Heart Attack Maternal Grandfather 76     Physical Exam:   /72   Pulse 71   Resp 12   Wt 142 lb (64.4 kg)   LMP 06/05/2021   SpO2 98%   BMI 21.59 kg/m²    Body mass index is 21.59 kg/m². Physical Exam   Nursing note and vitals reviewed. Constitutional: Oriented to person, place, and time. Appears well-developed and well-nourished. No distress. Head: Normocephalic and atraumatic. Eyes: Conjunctivae and EOM are normal.   Pulmonary/Chest: Effort normal. No respiratory distress. Neurological: Alert and oriented to person, place, and time.    Skin: There is ecchymosis present on the left breast.    Psychiatric: Normal mood and affect. Behavior is normal    Breast:    BREAST WORKSHEET       Weight: Weight: 142 lb (64.4 kg)   Height:      BMI: Body mass index is 21.59 kg/m². Marital Status:        [ ] S       [x ] M       [ ] D        [ ] W  How many pregnancies:  2  Children: 2  Planning to breast feed in the future? no    PATIENT HISTORY  History of Breast Cancer:      [ x] Yes       [ ] No      [ ] Right    [ ] Left     [ ] Bilateral  Last Mammogram: May 2021       [facility] women care  Family History of Breast Cancer? [x ] Yes  -mother     [ ] No     Breast Masses:       [ ] Right     [x ] Left     Year: 2021  Breast Biopsies:                 [ ] Right     [x ] Left     Year: 2021  Previous Breast Surgeries:      [ ] Yes       [x ] No       Year:             Type:   Mastectomy:        [ ] Right     [ ] Left     Year:  Lumpectomy:        [ ] Right     [ ] Left     Year:  Radiation Treatment:       [ ] Yes       [x ] No       Year:               Where? Doctor? Miscellaneous:       [ ] Fibrocystic changes            [ ] Systemic Disease   [ ] Mastalgia                              [ ] Diabetes  Bleeding Problems:        [ ] Yes       [ x] No        Type: Allergies:    Allergies as of 06/16/2021    (No Known Allergies)     Other:     PHYSICAL EXAM  Chest Wall        [ ] Pectus Deform      [ ] Scoliosis      [ ] Scars      [ ] Venous Stasis  Asymmetries:         [ ] Right      [ ] Left      [ ] Bilateral  Re-Construction       [ ] Right      [ ] Left      [ ] Bilateral  Tubular:Discussed Options:       [ ] Right      [ ] Left      [ ] Bilateral  Striae:          [ ] Right      [ ] Left      [ ] Bilateral  Other:     NEUROLOGICAL EXAM  Normal:        [ ] Yes       [ ] No           Problem/Explain:   MEASUREMENTS     Chest Circumference in Inches   Above the breast: 35.25 Chest Circumference in Inches   Bellow the breast: 31   Breast Girth (inches)          35.75                                               Sternal Notch to Nipple RIGHT (cm)  23 Sternal Notch to Nipple LEFT (cm)22.5   Nipple to IMF RIGHT (cm) 7.1   Nipple to IMF LEFT (cm) 5.9   Breast Width RIGHT (cm)    14.9                                           Breast Width LEFT (cm)   14.0                                          Current bra size:  34B Desired bra size   unchanged         Resection Grams for each Size  32-34 = 100g   36-38 = 200g     42/44 = 300g     44-46 = 400g   BSA GRAMS OF TISSUE TO BE REMOVED PER BREAST    1.40-1.50 218-260   1.51-1.60 261-310   1.61-1.70 311-370   1.71-1.80 371-441   1.81-1.90 442-527   1.91-2.00 054-092   2.01-2.10 629-750   2.11-2.20 064-875   2.21-2.30 [phone]   2.31-2.40 7890-1685   2.41-2.50 8756-7584   2.51-2.60 7667-9146   2.61-2.70 9965-0296   2.7-2.80 1905-4515   2.81-2.90 7038-0911   2.91-3.00 8334-0540       Reconstruction options discussed: We discussed oncoplastic reconstruction of the left breast.  The left breast is smaller than the right breast    I discussed with the patient that the patient is responsible for obtaining a mammogram prior to any surgical intervention if they have not had a mammogram within 1 year of the scheduled procedure. Refer to:     Imaging:                               Impression/Plan:      Diagnosis Orders   1. Breast carcinoma, female, left [facility])       Patient Active Problem List   Diagnosis    Body mass index (BMI) 20.0-20.9, adult    Refused influenza vaccine    Breast carcinoma, female, left ([facility] [address]       Plan:  Patient with biopsy-proven breast cancer lobular carcinoma located between the 1230 to 1 o'clock position of the left breast.  We discussed oncoplastic reconstruction. The risks were discussed with the patient in detail. All questions were answered.          The following information was discussed with the patient, but this is not an all-inclusive-other issue were also discussed with patient. The specific risks of reduction mammoplasty surgery were explained to the patient. These include but not limited to changes in nipple and skin sensation and which may be experienced as but limited not limited to diminished or loss of sensitivity of nipple and the skin of the breast.  Partial or permanent loss of nipple sensation can occur in one or both nipples. Nipple sensation may be lost if nipple graft technique, so I used. Changes in skin and nipple sensation may change/affect your sexual response. or the ability to breast feed. In some circumstances the nipple may be lost entirely. In some circumstances you may have hypersensitivity which is excess sensitivity of the nipple/areolar skin. Skin contour irregularities may occur after breast reduction surgery. Visible and palpable wrinkling may occur. There may be dimpling. One breast may be smaller than the other. Nipple position and shape will not be identical from one side to the next. Residual skin irregularities at the end of the incisions \" dogears\" are always a possibility when there is resection of excessive skin and redundant tissue. This may improve in time or may need to be surgically corrected. There are some breast asymmetry, that has been pointed out to you in this consultation. Differences in terms of breast and nipple shape, size, or symmetry and may also occur after surgery. Additional surgery may be necessary to revise asymmetry after breast reduction surgery. Unsatisfactory results may also occur there is no guarantee or warrantees expressed or implied as to what type of results that may be obtained. Also, no guarantee is expressed or implied on the size that is obtained. I explained to the patient that each bra company is different and a size in one bra company is not necessarily the same as another. I also explained to her that she may be disappointed with the results of that breast reduction surgery.   Asymmetry and the nipple location, unanticipated breast shape and size, loss of function, wound disruption especially in the T-zone where the vertical and horizontal incision come together, poor healing, and loss of sensation or hypersensitivity may occur after her breast reduction surgery. Breast size and contour may not be as it was expected, or as the patient has anticipated. Unsatisfactory surgical scar, location or visible deformities may be presents. There may be fat necrosis which may feel as hard lumps and spots in the breast.  Lipoplasty may be necessary to thin breast tissue that is outside of the normal surgical location for reduction mammoplasties. This is generally not covered by the insurance. It may be necessary to perform additional surgeries in order to improve the results. I discussed with the patient that we cannot guarantee as size. I discussed the breast asymmetry. I also discussed with the patient we cannot guarantee an upper symmetry. I also discussed with the patient about fat necrosis. Also discussed with the patient regarding the loss of nipple and areolar complex. Also discussed with the patient about changes in sensation in the nipple and areolar complex including hyper or hypersensitivity. Also discussed with the patient regarding wound healing issues. I also discussed with the patient regarding seromas bleeding infection. No guarantees were made as far as relief of pain and discomfort. General surgical risks: This is an elective surgery. You always have the option of no surgery. Seroma- Fluid may accumulate around the tissue expander/implant or surgical sites following surgery, from trauma or vigorous exercise. Additional treatment may be necessary to drain fluid accumulation around tissue expander or implant or surgical site. This may contribute to infection, capsular contracture, or other problems.     Bleeding- It is possible, to experience a bleeding episode during or after surgery this can increase if you are on any blood thinners. Should post-operative bleeding occur, it may require emergency treatment to drain accumulated blood or blood transfusion. Intra-operative blood transfusion may also be required. Hematoma may contribute to capsular contracture, infection or other problems. Do not take any aspirin or anti-inflammatory medications for ten days before or after surgery, as this may increase the risk of bleeding. Also discussed with the patient that if the patient is taking aspirin for medical reasons, we may need to continue this to prevent medical complications patient understands that this will lead to increased bleeding. Non-prescription herbs and dietary supplements can increase the risk of surgical bleeding. Hematoma can occur at any time following injury to the breast. If blood transfusions are necessary to treat blood loss, there is the risk of blood-related infections such as hepatitis and HIV (AIDS). Heparin or other blood thinner medications that are used to prevent blood clots in veins can produce bleeding and decreased blood platelets. List of medications that thin the blood was provided to the patient. Infection- Infections can occur after any type of surgery, it may appear in the immediate post-operative period or at any time following surgery. Subacute or chronic infections may be difficult to diagnose. Should an infection occur, treatment including antibiotics. You may need additional surgery. In extremely rare instances, life-threatening infections, including toxic shock syndrome have been noted after tissue expander/implant/nasal packing or any implantable device or packing. Individuals with an active infection in their body or weakened immune system (currently receiving or have received chemotherapy or drugs to suppress the immune system), may be at greater risk for infection.   Surgical Anesthesia- Both local and general anesthesia involved risk. There is the possibility of complications, injury, and even death from all forms of surgical anesthesia or sedation. Scarring- All surgery leaves scars, some more visible than others. Excessive scarring can occur depending on the healing process. Although wound healing after a surgical procedure is expected, abnormal scars may occur within the skin and deeper tissues. Scars may be unattractive and of different color than the surrounding skin tone. Scar appearance may also vary within the same scar. Scars may be asymmetrical (appear different on the right and left side of the body. There is the possibility of visible marks in the skin from sutures. In some cases, scars may require surgical revision or treatment. Firmness:  Excessive firmness can occur after surgery due to the internal scarring. This can also be due to fat necrosis. This occurrence is not predictable. Additional treatment or surgery may be required to treat this. Allergic Reactions- In some cases, local allergies to tape, suture material and glues, blood products, topical preparations or injected agents have been reported. Serious systemic reactions including shock (anaphylaxis) may occur in response to drugs used during surgery and prescription medicines. Allergic reactions may require additional treatment  Thrombosed Veins- Thrombosed veins, which resemble cords, occasionally develop in the area of the surgery, and resolve without medical or surgical treatment. Unusual Activities and Occupations- Activities and occupations that have the potential for trauma to the area could potentially break or damage any device or cause bleeding/seroma. Pain- You will experience pain after your surgery. Pain of varying intensity and duration may occur and persist after surgery. Pain may be the result of multiple factors including but not limited to internal or external scarring, or sensory nerve entrapment or injury.    Chronic pain may occur for various reasons such as from nerves becoming trapped in scar tissue or due to tissue stretching this may be temporary or permanent. Skin Discoloration / Swelling- Some bruising and swelling normally occurs after surgery. The skin in or near the surgical site can appear either lighter or darker than surrounding skin. Although sometimes swelling and skin discoloration may persist for long periods of time and, in some situations, may be permanent. Sutures- Most surgical techniques use deep sutures. You may notice these sutures after your surgery. Sutures may spontaneously poke through the skin, become visible or produce irritation that requires suture removal.  At times these deep sutures can cause skin infections and he need to be removed. Damage to Deeper Structures- There is the potential for injury to deeper structures including nerves, blood vessels and muscles and lungs (pneumothorax) during any surgical procedure. The potential for this to occur varies according to the type of procedure being performed. Injury to deeper structures may be temporary or permanent. Delayed Healing- Wound disruption or delayed wound healing is possible. Some areas of the skin region may not heal normally and may take a long time to heal.  Areas of skin or tissue may die. This may require frequent dressing changes or further surgery to remove the non-healed tissue. Individuals who have decreased blood supply to any tissue from past surgery, or radiation therapy, medications, or other reasons may be at increased risk for wound healing and poor surgical outcome. Smokers or secondhand smoke places you at a greater risk of skin loss and wound healing complications, and possible cardiopulmonary complications including but not limited to postoperative pneumonia.     Cardiac and Pulmonary Complications- Pulmonary complications may occur secondarily to both blood clots (pulmonary emboli), fat deposits (fat emboli) or partial collapse of the lungs after general anesthesia. Pulmonary emboli can be life threatening or fatal in some circumstances. Inactivity and other conditions may increase the incidence of blood clots traveling to the lungs causing a major blood clot that may result in death. It is important to discuss with your physician any past history of swelling in your legs or blood clots that may contribute to this condition. Cardiac complications are a risk with any surgery and anesthesia, even in patients without symptoms. Should any of these complications occur, you may require hospitalization and additional treatment. If you experience after surgery shortness of breath, chest pain, or unusual heart beats, you should have this evaluated immediately  Shock- In rare circumstances, your surgical procedure can cause severe trauma, particularly when multiple or extensive procedures are performed. Although serious complications are infrequent, infections or excessive fluid loss can lead to severe illness and even death. If surgical shock occurs, hospitalization and additional treatment would be necessary. Radiation Therapy- Radiation therapy to the region before or after surgery can produce unacceptable firmness or other long-term complications. Unsatisfactory Result- There is no guarantee or warranty expressed or implied, on the results that may be obtained. You may be disappointed with the results of surgery. Asymmetry, loss of function, wound disruption, poor healing, and loss of sensation may occur after surgery. Unsatisfactory surgical scar location may occur. any type of surgical treatment may fail due to complications attributable to previous or from later chemotherapy/radiation therapy treatments, or other treatments. It may be necessary to perform additional surgery to improve your results.   Obesity: If you're BMI is greater than then 30 you have higher chance of surgical complications including but not limited to wound healing problems etc.  Long-Term Results- Subsequent alterations in breast/body shape may occur as the result of aging, sun exposure, weight loss, weight gain, pregnancy, menopause, or other circumstances not related to your surgery. These factors may affect your surgical results. Mental Health Disorders and Elective Surgery- It is important that all patients seeking to undergo elective surgery have realistic expectations that focus on improvement rather than perfection. Complications or less than satisfactory results are sometimes unavoidable, may require additional surgery and often are stressful. Please openly discuss with your surgeon, prior to surgery, any history that you may have of significant emotional depression or mental health disorders. Although many individuals may benefit psychologically from the results of elective surgery, effects on mental health cannot be accurately predicted. Medications- There are potential adverse reactions that occur as the result of taking over the counter, herbal, and/or prescription medications. Be sure to check with your physician about any drug interactions that may exist with medications that you are already taking. If you have an adverse reaction, stop the drugs immediately and call your plastic surgeon for further instructions. If the reaction is severe, go immediately to the nearest emergency room. When taking the prescribed pain medications after surgery, realize that they can affect your thought process and coordination. DO NOT drive, DO NOT  operate complex equipment, DO NOT make any important decisions, and DO NOT drink any alcohol while taking these medications. Be sure to take your prescribed medication only as directed.     Smoking, Second-Hand Smoke Exposure, Nicotine Products (Patch, Gum, Nasal Spray):   Patients who are currently smoking, use tobacco products, or nicotine products (patch, gum, or nasal spray) are at a greater risk for significant surgical complications of skin dying and delayed healing. Individuals exposed to second-hand smoke are also at potential risk for similar complications attributable to nicotine exposure. Additionally, smokers may have a significant negative effect on anesthesia and recovery from anesthesia, with coughing and possibly increased bleeding. Individuals who are not exposed to tobacco smoke or nicotine-containing products have a significantly lower risk of this type of complication. Obesity: If you're BMI is greater than 30 you may have a higher chance of complications. This may include but not limited to wound healing and infections. Also, if you have other medical problems such as diabetes and hypertension it may effect your healing as well as your surgical results in bleeding. ADDITIONAL SURGERY NECESSARY  Many variable conditions may influence the long-term result surgery . It is unknown how your tissue may respond to surgery or how wound healing will occur after surgery. Secondary surgery may be necessary to improve your outcome. Should complications occur, additional surgery or other treatments may be necessary. Other complications and risks can occur but are even more uncommon. The practice of medicine and surgery is not an exact science. There is no guarantee or warranty expressed or implied, on the results that may be obtained. In some situations, it may not be possible to achieve optimal results with a single surgical procedure. PATIENT COMPLIANCE  Discussed the importance of patient's compliance and falling down postoperative instructions carefully; this is essential for the success of your outcome. It is important that the surgical incisions are not subjected to excessive force, swelling, abrasion, or motion during the time of healing. Personal and vocational activity must be restricted.   Protective dressings and drains should not be removed

## 2021-06-16 NOTE — LETTER
[facility]  [address] [facility] [address]  Phone: [phone]  Fax: [phone]           Miguelangel Daniel MD      June 16, 2021     Patient: Sammy Cornejo   MR Number: [id number]   YOB: 1970   Date of Visit: 6/16/2021       Dear Dr. Shyann Fields ref. provider found: Thank you for referring Antonio Almanza to me for evaluation/treatment. Below are the relevant portions of my assessment and plan of care. Plan:  Patient with biopsy-proven breast cancer lobular carcinoma located between the 12:30 to 1 o'clock position of the left breast.  We discussed oncoplastic reconstruction. The risks were discussed with the patient in detail. All questions were answered     If you have questions, please do not hesitate to call me. I look forward to following Jose Beltran along with you.     Sincerely,    MD Miguelangel Fonseca MD    CC providers:  Jayant Prajapati, [address] [facility]  [address] [place]

## 2021-06-16 NOTE — PROGRESS NOTES
BREAST WORKSHEET     Weight: Weight: 142 lb (64.4 kg)   Height:      BMI: Body mass index is 21.59 kg/m². Marital Status:        [ ] S       [x ] M       [ ] D        [ ] W  How many pregnancies:  2  Children: 2  Planning to breast feed in the future? no    PATIENT HISTORY  History of Breast Cancer:      [ x] Yes       [ ] No      [ ] Right    [ ] Left     [ ] Bilateral  Last Mammogram: May 2021       [facility]: [facility] women care  Family History of Breast Cancer? [x ] Yes  -mother     [ ] No     Breast Masses:       [ ] Right     [x ] Left     Year: 2021  Breast Biopsies:                 [ ] Right     [x ] Left     Year: 2021  Previous Breast Surgeries:      [ ] Yes       [x ] No       Year:             Type:   Mastectomy:        [ ] Right     [ ] Left     Year:  Lumpectomy:        [ ] Right     [ ] Left     Year:  Radiation Treatment:       [ ] Yes       [x ] No       Year:               Where? Doctor? Miscellaneous:       [ ] Fibrocystic changes            [ ] Systemic Disease   [ ] Mastalgia                              [ ] Diabetes  Bleeding Problems:        [ ] Yes       [ x] No        Type: Allergies:    Allergies as of 06/16/2021    (No Known Allergies)     Other:     PHYSICAL EXAM  Chest Wall        [ ] Pectus Deform      [ ] Scoliosis      [ ] Scars      [ ] Venous Stasis  Asymmetries:         [ ] Right      [ ] Left      [ ] Bilateral  Re-Construction       [ ] Right      [ ] Left      [ ] Bilateral  Tubular:Discussed Options:       [ ] Right      [ ] Left      [ ] Bilateral  Striae:          [ ] Right      [ ] Left      [ ] Bilateral  Other:     NEUROLOGICAL EXAM  Normal:        [ ] Yes       [ ] No           Problem/Explain:   MEASUREMENTS     Chest Circumference in Inches   Above the breast: 35.25 Chest Circumference in Inches   Bellow the breast: 31   Breast Girth (inches)          35.75                                               Sternal Notch to Nipple RIGHT (cm)  23

## 2021-06-17 ENCOUNTER — HOSPITAL ENCOUNTER (OUTPATIENT)
Age: 51
Setting detail: OUTPATIENT SURGERY
End: 2021-06-17
Attending: SURGERY | Admitting: SURGERY
Payer: COMMERCIAL

## 2021-06-18 ENCOUNTER — INITIAL CONSULT (OUTPATIENT)
Dept: ONCOLOGY | Age: 51
End: 2021-06-18
Payer: COMMERCIAL

## 2021-06-18 ENCOUNTER — TELEPHONE (OUTPATIENT)
Dept: ONCOLOGY | Age: 51
End: 2021-06-18

## 2021-06-18 ENCOUNTER — CLINICAL DOCUMENTATION (OUTPATIENT)
Dept: PHYSICAL THERAPY | Facility: CLINIC | Age: 51
End: 2021-06-18

## 2021-06-18 ENCOUNTER — HOSPITAL ENCOUNTER (OUTPATIENT)
Age: 51
Discharge: HOME | End: 2021-06-18
Payer: COMMERCIAL

## 2021-06-18 ENCOUNTER — HOSPITAL ENCOUNTER (OUTPATIENT)
Dept: MRI IMAGING | Age: 51
Discharge: HOME | End: 2021-06-20
Payer: COMMERCIAL

## 2021-06-18 ENCOUNTER — HOSPITAL ENCOUNTER (OUTPATIENT)
Dept: RADIATION ONCOLOGY | Age: 51
Discharge: HOME | End: 2021-06-18
Payer: COMMERCIAL

## 2021-06-18 VITALS
RESPIRATION RATE: 16 BRPM | WEIGHT: 139.9 LBS | SYSTOLIC BLOOD PRESSURE: 113 MMHG | HEART RATE: 61 BPM | TEMPERATURE: 98.2 F | BODY MASS INDEX: 20.72 KG/M2 | DIASTOLIC BLOOD PRESSURE: 68 MMHG | HEIGHT: 69 IN

## 2021-06-18 DIAGNOSIS — C50.912 BREAST CARCINOMA, FEMALE, LEFT (HCC): ICD-10-CM

## 2021-06-18 DIAGNOSIS — Z80.3 FAMILY HISTORY OF BREAST CANCER: ICD-10-CM

## 2021-06-18 DIAGNOSIS — D05.02 BREAST NEOPLASM, TIS (LCIS), LEFT: ICD-10-CM

## 2021-06-18 DIAGNOSIS — C50.912 BREAST CARCINOMA, FEMALE, LEFT (HCC): Primary | ICD-10-CM

## 2021-06-18 DIAGNOSIS — Z17.0 MALIGNANT NEOPLASM OF LEFT BREAST IN FEMALE, ESTROGEN RECEPTOR POSITIVE, UNSPECIFIED SITE OF BREAST (HCC): ICD-10-CM

## 2021-06-18 DIAGNOSIS — Z17.0 MALIGNANT NEOPLASM OF LEFT BREAST IN FEMALE, ESTROGEN RECEPTOR POSITIVE, UNSPECIFIED SITE OF BREAST (HCC): Primary | ICD-10-CM

## 2021-06-18 DIAGNOSIS — C50.912 MALIGNANT NEOPLASM OF LEFT BREAST IN FEMALE, ESTROGEN RECEPTOR POSITIVE, UNSPECIFIED SITE OF BREAST (HCC): Primary | ICD-10-CM

## 2021-06-18 DIAGNOSIS — Z17.0 MALIGNANT NEOPLASM OF UPPER-OUTER QUADRANT OF LEFT BREAST IN FEMALE, ESTROGEN RECEPTOR POSITIVE (HCC): ICD-10-CM

## 2021-06-18 DIAGNOSIS — C50.912 MALIGNANT NEOPLASM OF LEFT BREAST IN FEMALE, ESTROGEN RECEPTOR POSITIVE, UNSPECIFIED SITE OF BREAST (HCC): ICD-10-CM

## 2021-06-18 DIAGNOSIS — C50.412 MALIGNANT NEOPLASM OF UPPER-OUTER QUADRANT OF LEFT BREAST IN FEMALE, ESTROGEN RECEPTOR POSITIVE (HCC): ICD-10-CM

## 2021-06-18 LAB
ABSOLUTE EOS #: 0.2 K/UL (ref 0–0.4)
ABSOLUTE IMMATURE GRANULOCYTE: ABNORMAL K/UL (ref 0–0.3)
ABSOLUTE LYMPH #: 1.7 K/UL (ref 1–4.8)
ABSOLUTE MONO #: 0.6 K/UL (ref 0.1–1.2)
ALBUMIN SERPL-MCNC: 4.7 G/DL (ref 3.5–5.2)
ALBUMIN/GLOBULIN RATIO: 1.6 (ref 1–2.5)
ALP BLD-CCNC: 147 U/L (ref 35–104)
ALT SERPL-CCNC: 14 U/L (ref 5–33)
ANION GAP SERPL CALCULATED.3IONS-SCNC: 8 MMOL/L (ref 9–17)
AST SERPL-CCNC: 20 U/L
BASOPHILS # BLD: 1 % (ref 0–2)
BASOPHILS ABSOLUTE: 0.1 K/UL (ref 0–0.2)
BILIRUB SERPL-MCNC: 0.51 MG/DL (ref 0.3–1.2)
BUN BLDV-MCNC: 14 MG/DL (ref 6–20)
BUN/CREAT BLD: ABNORMAL (ref 9–20)
CALCIUM SERPL-MCNC: 9.7 MG/DL (ref 8.6–10.4)
CHLORIDE BLD-SCNC: 102 MMOL/L (ref 98–107)
CO2: 28 MMOL/L (ref 20–31)
CREAT SERPL-MCNC: 0.79 MG/DL (ref 0.5–0.9)
DIFFERENTIAL TYPE: ABNORMAL
EOSINOPHILS RELATIVE PERCENT: 3 % (ref 1–4)
GFR AFRICAN AMERICAN: >60 ML/MIN
GFR NON-AFRICAN AMERICAN: >60 ML/MIN
GFR SERPL CREATININE-BSD FRML MDRD: ABNORMAL ML/MIN/{1.73_M2}
GFR SERPL CREATININE-BSD FRML MDRD: ABNORMAL ML/MIN/{1.73_M2}
GLUCOSE BLD-MCNC: 85 MG/DL (ref 70–99)
HCT VFR BLD CALC: 44.4 % (ref 36–46)
HEMOGLOBIN: 15.1 G/DL (ref 12–16)
IMMATURE GRANULOCYTES: ABNORMAL %
LYMPHOCYTES # BLD: 23 % (ref 24–44)
MCH RBC QN AUTO: 31.5 PG (ref 26–34)
MCHC RBC AUTO-ENTMCNC: 33.9 G/DL (ref 31–37)
MCV RBC AUTO: 92.8 FL (ref 80–100)
MONOCYTES # BLD: 8 % (ref 2–11)
NRBC AUTOMATED: ABNORMAL PER 100 WBC
PDW BLD-RTO: 12.5 % (ref 12.5–15.4)
PLATELET # BLD: 253 K/UL (ref 140–450)
PLATELET ESTIMATE: ABNORMAL
PMV BLD AUTO: 9.5 FL (ref 6–12)
POTASSIUM SERPL-SCNC: 4.4 MMOL/L (ref 3.7–5.3)
RBC # BLD: 4.78 M/UL (ref 4–5.2)
RBC # BLD: ABNORMAL 10*6/UL
SEG NEUTROPHILS: 65 % (ref 36–66)
SEGMENTED NEUTROPHILS ABSOLUTE COUNT: 4.9 K/UL (ref 1.8–7.7)
SODIUM BLD-SCNC: 138 MMOL/L (ref 135–144)
TOTAL PROTEIN: 7.7 G/DL (ref 6.4–8.3)
WBC # BLD: 7.4 K/UL (ref 3.5–11)
WBC # BLD: ABNORMAL 10*3/UL

## 2021-06-18 PROCEDURE — 85025 COMPLETE CBC W/AUTO DIFF WBC: CPT

## 2021-06-18 PROCEDURE — 96040 PR GENETIC COUNSELING, EACH 30 MIN: CPT | Performed by: GENETIC COUNSELOR, MS

## 2021-06-18 PROCEDURE — 80053 COMPREHEN METABOLIC PANEL: CPT

## 2021-06-18 PROCEDURE — 99244 OFF/OP CNSLTJ NEW/EST MOD 40: CPT | Performed by: RADIOLOGY

## 2021-06-18 PROCEDURE — 77049 MRI BREAST C-+ W/CAD BI: CPT

## 2021-06-18 PROCEDURE — 82670 ASSAY OF TOTAL ESTRADIOL: CPT

## 2021-06-18 PROCEDURE — 6360000004 HC RX CONTRAST MEDICATION

## 2021-06-18 PROCEDURE — 83001 ASSAY OF GONADOTROPIN (FSH): CPT

## 2021-06-18 PROCEDURE — A9579 GAD-BASE MR CONTRAST NOS,1ML: HCPCS

## 2021-06-18 PROCEDURE — 2580000003 HC RX 258

## 2021-06-18 PROCEDURE — 99202 OFFICE O/P NEW SF 15 MIN: CPT | Performed by: INTERNAL MEDICINE

## 2021-06-18 PROCEDURE — 99245 OFF/OP CONSLTJ NEW/EST HI 55: CPT | Performed by: INTERNAL MEDICINE

## 2021-06-18 PROCEDURE — 36415 COLL VENOUS BLD VENIPUNCTURE: CPT

## 2021-06-18 RX ORDER — 0.9 % SODIUM CHLORIDE 0.9 %
40 INTRAVENOUS SOLUTION INTRAVENOUS ONCE
Status: COMPLETED | OUTPATIENT
Start: 2021-06-18 | End: 2021-06-18

## 2021-06-18 RX ORDER — SODIUM CHLORIDE 0.9 % (FLUSH) 0.9 %
10 SYRINGE (ML) INJECTION PRN
Status: DISCONTINUED | OUTPATIENT
Start: 2021-06-18 | End: 2021-06-21 | Stop reason: HOSPADM

## 2021-06-18 RX ADMIN — SODIUM CHLORIDE 40 ML: 9 INJECTION, SOLUTION INTRAVENOUS at 14:08

## 2021-06-18 RX ADMIN — GADOTERIDOL 13 ML: 279.3 INJECTION, SOLUTION INTRAVENOUS at 14:11

## 2021-06-18 RX ADMIN — SODIUM CHLORIDE, PRESERVATIVE FREE 10 ML: 5 INJECTION INTRAVENOUS at 14:11

## 2021-06-18 NOTE — CARE COORDINATION
[facility] OUTPATIENT REHABILITATION  BREAST CANCER CLINIC SCREEN        Date:  2021  Patient: Douglas Perez  : 1970  MRN: [id number]  Physician: Jerald Degroot Patel     Gender: F      present for consult    Location L breast   Type LCIS     Hormone Type ER-/UT+     HER2-acosta   neg     Suspected Lymph Node involvement yes   Anticipated Chemo ? Anticipated Radiation yes   Surgery date [address]        Right Left   Shoulder Flexion  140   Shoulder Abduction  131   Shoulder ER ROM 80 80   Shoulder Strength Flex/Abd 5/5 5/5   Elbow Strength Flex/ext 5/5 5/5   Hand Dominance x        Measurements Upper Extremity  Measurements taken from the base of fingernail on D3 in cm. Fingers measured at base. Measurements in Centimeters  Right Left      Dorsum 14 20.1 19.2    Wrist 20 15 15    Lower Forearm        Upper Forearm  35 22.5 22.5    Olecranon  43 24.5 24.5    Lower Bicep  50 26 26.5    Upper Bicep/SHLD 64 30.5 30.5    Total 138.6  138.2         TEST    Posture    Forward head Fwd head, shoulders   kyphosis    scoliosis              Instructed pt in posture awareness and avoiding protective posturing. Instructed in shoulder rolls and wall slides for home until surg          [x]  Lymphedema education sheet issued       [x]  [facility]      [x]  Additional Concerns/Red Flags-pt concerned about lymphedema, wants to prevent   Previous neck or shoulder surgeries L frozen shoulder 6+ yrs ago         Occupation, sports, interests             []  Preferred location   [x]  [place]   []  [facility].   []   [facility]   []   [facility]- maybe?       Follow up phone call:__will call to schedule next week for 3 weeks post op appt ______________________________________________________

## 2021-06-18 NOTE — PROGRESS NOTES
3 [facility] Program   Hereditary Cancer Risk Assessment     Name: iRp Catalan   YOB: 1970   Date of Consultation: 6/18/21     Ms. Neris Waterman was seen at the [place] [address] for genetic counseling on 6/18/21 due to her personal history of breast cancer. PERSONAL HISTORY   Ms. Neris Waterman is a 45 y.o.  female who was recently diagnosed with breast cancer at age 45. Specifically, it is an invasive lobular carcinoma which is ER-/AZ+/Her2-.    Ms. Neris Waterman reports menarche at age 14, first child at age 31, and is premenopausal. Ms. Neris Waterman has never had a hysterectomy and both ovaries are intact. FAMILY HISTORY  Ms. Neris Waterman 's family history is significant for her mother with breast cancer diagnosed at age 58. There are no other close maternal relatives with cancer. It appears that there are some distant cousins with breast cancer (4th and 5th degree). She reports one paternal aunt with renal cancer and another aunt with cancer of unknown origin. Ms. Neris Waterman reports  ancestry and denies any known Ashkenazi Sh[employer] heritage. RISK ASSESSMENT   We discussed that approximately 5-10% of cancers are due to a hereditary gene mutation which causes an increased risk for certain cancers. Hereditary cancers are typically diagnosed at younger ages (under age 46y) and occur in multiple generations of a family. Multiple individuals with the same type of cancer (example: breast or colorectal) or uncommon cancers (example: ovarian, pancreatic, male breast cancer) are also features of hereditary cancers. Ms. Neris Waterman does not currently meet the [address] [place] [address] (NCCN) guidelines for genetic testing of the BRCA1/2 genes given that there are 2 total breast cancer in her family both diagnosed over age 48. She is very close to medical criteria given that her breast cancer diagnosis occurred at age 45.      DISCUSSION  We discussed that the BRCA1/2 genes are the most common genes associated with hereditary breast and ovarian cancer. We also discussed that genetic testing is available for multiple other genes related to hereditary cancer. Some of these genes are known to carry a significant increased risk for several cancers including colon, breast, uterine, ovarian, stomach, and pancreatic cancer, while some of these genes are believed to have a moderate increased risk for breast and other cancers. We discussed the possibility of finding a mutation in genes with limited information to guide medical management, as well we as the possibility of identifying variants of uncertain significance (VUS). We discussed the risks, benefits, and limitations of genetic testing. Possible test results were discussed as well as potential screening and prevention strategies. Specifically, we discussed increased breast cancer surveillance by mammogram and breast MRI as well as the option of prophylactic mastectomy. We discussed the recommendation for prophylactic oophorectomy for results which suggest an increased risk for ovarian cancer. Lastly, we discussed that the results of Ms. Hayes's genetic testing may be beneficial in defining her risk for cancer as well as for her family members. SUMMARY & PLAN  1) Ms. Rajendra Anaya does not currently meet the NCCN criteria for genetic testing given that her breast cancer was diagnosed over age 48 and that she has just one other close relative with breast cancer also diagnosed over age 48.     2) Genetic testing was discussed and offered to Ms. Rajendra Anaya with the upfront knowledge that she does not meet medical criteria or her insurance criteria for genetic testing. Genetic testing is available for an out of pocket cost of $249.     3) Ms. Rajendra Anaya elected to proceed with the CancerNext Expanded + RNA Insight gene panel. 4) Informed consent was obtained and a blood sample was sent to [facility].  We will call Ms. Kameron Reddy with results as soon as they are available. A follow up appointment may be recommended. A summary letter with results and final medical management recommendations will be sent once available. A total of 35 minutes were spent face to face with Ms. Kameron Reddy and 50% of the time was spent educating and counseling. The [address] [place] [address] Program would be glad to offer our assistance should you have any questions or concerns about this information. Please feel free to contact us at [phone]. Neha Leone.  Artist MS Jorge, [facility]   Licensed Genetic Counselor

## 2021-06-18 NOTE — TELEPHONE ENCOUNTER
I met with pt and family. Introduced myself as the breast cancer patient navigator and that I am here to help her to navigate the disease process, treatment, and follow up. I gave pt the [address]. Breast Cancer Folder includes [facility] [place] and [employer]. Reviewed folder. Explained that I am here to assist her before, during and after her treatments are completed. Offered pt support and encouragement. Offered patient Baskets of Care Bag  Reviewed contents of bag. Patient was seen and examined by Dr. Margie Rowan, Dr. Pasha Solorio. Vito Lancaster has previously been seen by Dr. Kyle Stinson and Dr. My Martínez is:  Mri breast, Surgery first, thinking breast conserving surgery with slnb (frozen section) and oncoplastic reconstruction. Chemotherapy to be determined based on pathology and oncotype dx testing. Radiation therapy if bcs. Patient does not meet NCCN criteria for genetic testing. Pt decided to proceed with testing with out of pocket cost.    Blood sample drawn for genetics and for labs ordered by doctor. Mri is scheduled for later today. Informed patient and family about what support services that we have available to our patients. Support service information sheet given to patient. This form includes support service providers name, description of job and contact numbers. Discussed Oncology rehab, explained that it can be helpful before-during and after treatment. We discussed that it can be valuable to meet with physical therapist to have lymphedema education and learn preventative measures. Explained lymphedema does not always occur and does not always occur right away. We want her to know what to look for and how to prevent it. Encouraged her to call if any concerns at anytime with heaviness, swelling in chest, arm, hand or fingers. We have a lymphedema specialist that she can see. Treatments include massage, wrapping, compression sleeve. Encouraged to avoid blood draws, iv's, bp on surgical arm. Avoid tight clothing or jewelry or carrying heavy purse or bags with the affected arm. Maintenance compression sleeve may be beneficial when flying and when doing increased activity. Offered referral to onc rehab. Patient seen by Jm Correa from [facility]. Referred to [facility] onc rehab. Reviewed breast clinic recommendations with patient. Pt given copy of recommendations. Encouraged pt to call me with questions or concerns. Questions answered.  Pt placed on pending list.

## 2021-06-18 NOTE — PROGRESS NOTES
April Robins                                                                                                                  6/18/2021  MRN:   [id number]  YOB: 1970  PCP:                           Aaron Vallejo MD  Referring Physician: No ref. provider found  Treating Physician Name: Becca Hernandez MD      Reason for consultation:  Chief Complaint   Patient presents with    New Patient     breast cancer    Patient presents to the clinic to establish care and for further work-up and evaluation. Current problems:  Invasive lobular carcinoma of left breast, grade 2, ER positive strongly, SD positive moderate, HER-2 not amplified, clinical stage T1c, N0, M0  Left breast LCIS  Axillary lymph node cortical thickening  Premenopausal status    Active and recent treatments:  Anticipating surgery  Anticipating Oncotype testing on adjuvant hormonal therapy  Anticipating radiation therapy    Summary of Case/History:    April Robins a 45 y. o.female is a patient recently diagnosed with left breast lobular invasive cancer presents to the clinic to establish care and for further work-up and evaluation. Patient last mammogram done about 2 years ago was unremarkable but her latest mammogram showed asymmetrical density in the upper outer quadrant at 1230-1 o'clock position. This was further confirmed with ultrasound which showed a 1.7 cm x 1.3 cm mass. Patient underwent biopsy which confirmed a lobular cancer, grade 2. Ultrasound also showed suspicious lymph node however biopsy was negative for malignancy. Patient herself did not notice any palpable mass. Denies any breast pain nipple discharge denies any previous history of biopsy. Patient is premenopausal.    Patient age of menarche was 14 years. Age of first child was 31. Patient has 2 children. Patient is premenopausal.  She is not on birth control or hormonal medication. Does not smoke.   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 Patient has history of breast cancer in her mother at age of 58. Past Medical History:   Denies history of hypertension diabetes mellitus    Past Surgical History:     Past Surgical History:   Procedure Laterality Date    US BREAST NEEDLE BIOPSY LEFT Left 6/3/2021    US BREAST NEEDLE BIOPSY LEFT 6/3/2021 [address]    US LYMPH NODE BIOPSY  6/3/2021    US LYMPH NODE BIOPSY 6/3/2021 [address]       Patient Family Social History:    Family History   Problem Relation Age of Onset    Other Father         Gout    Breast Cancer Mother 59        ductal carcinoma lumpectomy w/ radiation   [facility] Migraines Mother     No Known Problems Brother     Breast Cancer Paternal Grandmother [de-identified]    Heart Attack Maternal Grandfather 76     Social History     Socioeconomic History    Marital status:      Spouse name: Not on file    Number of children: Not on file    Years of education: Not on file    Highest education level: Not on file   Occupational History    Not on file   Tobacco Use    Smoking status: Never Smoker    Smokeless tobacco: Never Used   Substance and Sexual Activity    Alcohol use: No    Drug use: No    Sexual activity: Yes     Partners: Male   Other Topics Concern    Not on file   Social History Narrative    Not on file     Social Determinants of Health     Financial Resource Strain:     Difficulty of Paying Living Expenses:    Food Insecurity:     Worried About Running Out of Food in the Last Year:     Ran Out of Food in the Last Year:    Transportation Needs:     Lack of Transportation (Medical):      Lack of Transportation (Non-Medical):    Physical Activity:     Days of Exercise per Week:     Minutes of Exercise per Session:    Stress:     Feeling of Stress :    Social Connections:     Frequency of Communication with Friends and Family:     Frequency of Social Gatherings with Friends and Family:     Attends Christian Services:     Active Member of Clubs or Organizations:     Attends Club or Organization Meetings:     Marital Status:    Intimate Partner Violence:     Fear of Current or Ex-Partner:     Emotionally Abused:     Physically Abused:     Sexually Abused:      Current Medications:  Patient does not currently take any prescription medication    Allergies:   Patient has no known allergies. Review of Systems:    Constitutional: No fever or chills. No night sweats, no weight loss   Eyes: No eye discharge, double vision, or eye pain   HEENT: negative for sore mouth, sore throat, hoarseness and voice change   Respiratory: negative for cough , sputum, dyspnea, wheezing, hemoptysis, chest pain   Cardiovascular: negative for chest pain, dyspnea, palpitations, orthopnea, PND   Gastrointestinal: negative for nausea, vomiting, diarrhea, constipation, abdominal pain, Dysphagia, hematemesis and hematochezia   Genitourinary: negative for frequency, dysuria, nocturia, urinary incontinence, and hematuria   Integument: negative for rash, skin lesions, bruises.    Hematologic/Lymphatic: negative for easy bruising, bleeding, lymphadenopathy, or petechiae   Endocrine: negative for heat or cold intolerance,weight changes, change in bowel habits and hair loss   Musculoskeletal: negative for myalgias, arthralgias, pain, joint swelling,and bone pain   Neurological: negative for headaches, dizziness, seizures, weakness, numbness        Physical Exam:    Vitals: /68   Pulse 61   Temp 98.2 °F (36.8 °C) (Oral)   Resp 16   Ht 5' 9\" (1.753 m)   Wt 139 lb 14.4 oz (63.5 kg)   LMP 06/05/2021   BMI 20.66 kg/m²   General appearance - well appearing, no in pain or distress  Mental status - AAO X3  Eyes - pupils equal and reactive, extraocular eye movements intact  Mouth - mucous membranes moist, pharynx normal without lesions  Neck - supple, no significant adenopathy  Lymphatics - no palpable lymphadenopathy, no hepatosplenomegaly  Chest - clear to auscultation, no wheezes, rales or rhonchi, symmetric air entry  Heart - normal rate, regular rhythm, normal S1, S2, no murmurs  Abdomen - soft, nontender, nondistended, no masses or organomegaly  Neurological - alert, oriented, normal speech, no focal findings or movement disorder noted  Extremities - peripheral pulses normal, no pedal edema, no clubbing or cyanosis  Skin - normal coloration and turgor, no rashes, no suspicious skin lesions noted   Breast-examination performed in the presence of patient's . Examination does not reveal any palpable mass. There is a bruise on the left breast consistent with the site of biopsy. No lymphadenopathy noted. DATA:    Results for orders placed or performed during the hospital encounter of 06/03/21   Surgical Pathology   Result Value Ref Range    Surgical Pathology Report       [id number]  [address] [place] [address]. [place], [address] [place], [place] [address]e [place]  [phone]  Fax: [phone]  [facility]    Patient Name: Myranda Larson  MR#: [id number]  Specimen #[id number]    [facility]  MOLECULAR PATHOLOGY REPORT     Date Ordered:     6/7/2021     Status:  Signed Out       Date Complete:     6/7/2021     By:  Delilah Quiñones M.D. Date Reported:     6/9/2021       INTERPRETATION    LEFT BREAST CARCINOMA:      -  NEGATIVE FOR HER2 (ERBB2) GENE AMPLIFICATION BY FISH. -  HER2: CEP (D17Z1) RATIO, 1.1         RESULTS-COMMENTS  THE PATIENT'S CARCINOMA IS EVALUATED FOR HER2 (ERBB2) GENE  AMPLIFICATION BY FISH ANALYSIS. [address] [facility],[address] OF PATHOLOGY IS A  BLOCK AND REPORT ([id number], 6/3/2021) FOR HER2 ANALYSIS     REFERENCE RANGES    This test was interpreted according to the American Society of  Clinical Oncology/College of American Pat hologists (ASCO/CAP) 2018  Guideline Recommendations for neoplasm fixed in formalin for 6-72  hours.   According to the 2018 ASCO/CAP Guidelines, a non-amplified  result indicates an ERBB2/CEP17 ratio less than 2.0 with an average  ERBB2 copy number less than 4.0 (designated group 5); an amplified  result indicates an ERBB2/CEP17 ratio of 2.0 or greater with an  average copy number of 4.0 or greater (group 1). Groups 2-4 encompass  findings previously designated as either positive or equivocal and  require further testing for characterization. Specifically, group 2  indicates a ratio of 2.0 or greater with an average copy number less  than 4.0. Group 3 indicates a ratio of less than 2.0 with an average  copy number of 6.0 or greater. Group 4 indicates a ratio of less than  2.0 with an average copy number of at least 4.0 but less than 6.0. A  case that is characterized into groups 2-4 will be forwarded to a  second laboratory for HER-2 IHC testing and possibly repeat testing by  [facility] for the final HER-2 status determination. Fluorescence in-situ hybridization (FISH) analysis is performed with a  probe specific for HER2 (ERBB2) and a probe, D17Z1, for the  pericentromeric region of chromosome 17 (PathVysion, [employer]). External  and internal controls perform appropriately. In this assay, the ratio  of HER2 signals to chromosome 17 signals is calculated. [de-identified] nuclei  are examined by two technologists independently, and the ratio of HER2  signals to chromosome 17 signals determined. A Pathologist determines  and marks the areas of invasive carcinoma to be evaluated. Number of Tumor Cells Counted:               (30+30)  Number of Observers:                     Two  Average Number of HER2 Signals/Nucleus:          1.7  Average Number of CEP 17 Signals/Nucleus:     1.6  Ratio of average HER2/CEP 17 (D17Z1):          1.1       Specimen:                         Left Breast  Fixative:                              10% Buffered formalin  Cold ischemia and fixation t imes appropriate:     Yes  Meets ASCO/CAP guidelines for analysis: Yes  Joint recommendation of the College of American Pathologists and  American Society of Clinical Oncology to optimize accuracy and  consistency of HER2 (ERBB2) testing is for prompt appropriate  sectioning of specimens with cold ischemia time < 1 hour and fixation  in 10% buffered formalin for 6-72 hours. The PathVysion kit is  designed to detect amplification of the HER2 (ERBB2) gene via  fluorescence in situ hybridization (FISH). This test is approved by  the U.S. Food and Drug Administration. Mehreen Jarrell M.D. Source:  1: LEFT BREAST FOR HER2 DANE BY FISH ANALYSIS       US BREAST LIMITED LEFT    Result Date: 5/26/2021  EXAMINATION: DIAGNOSTIC DIGITAL LEFT BREAST MAMMOGRAM WITH TOMOSYNTHESIS; TARGETED ULTRASOUND OF THE LEFT BREAST, 5/26/2021 8:24 am TECHNIQUE: Diagnostic mammography of the left breast was performed with tomosynthesis. 2D standard and 3D tomosynthesis combination imaging performed through the left breast.  Computer aided detection was utilized in the interpretation of this exam.; Target ultrasound of the left breast was performed. Views: Compression in the CC and MLO views with tomosynthesis. True lateral view with tomosynthesis. COMPARISON: 05/13/2021, 04/24/2019 HISTORY: ORDERING SYSTEM PROVIDED HISTORY: Breast asymmetry in female TECHNOLOGIST PROVIDED HISTORY: left breast asymmetry/ dense breast tissue / personal family hx breast ca in mother/-Mgrandmother Is the patient pregnant?->No FINDINGS: DIAGNOSTIC MAMMOGRAM: Heterogeneously dense breast parenchyma. The suspected mass remains visible in the 12 o'clock location with associated distortion. No microcalcification in this area. TARGETED ULTRASOUND: Targeted ultrasound was performed in the superior breast, revealing an irregular hypoechoic mass measuring 1.7 x 1.3 x 1.2 cm with spiculated margins, located in the 12:30 position 5 cm from the nipple.  Imaging of the left axilla reveals a lymph node with focal cortical thickening measuring 4 mm. 1. Suspicious spiculated mass measuring 1.7 cm in the 12 o'clock left breast is demonstrated. Ultrasound-guided biopsy of this mass is recommended. 2. Left axillary lymph node with focal cortical thickening, for which ultrasound-guided biopsy is recommended. I discussed these findings and recommendations with the patient in person at the time of imaging. BI-RADS 5 BIRADS: BIRADS - CATEGORY 5 Highly Suggestive for Malignancy. Biopsy should be considered at this time. OVERALL ASSESSMENT - HIGHLY SUGGESTIVE OF MALIGNANCY. A letter of notification will be sent to the patient regarding the results. My findings and recommendations were discussed with the patient at the time of service. A representative from the radiology department will be contacting your office and assisting the patient in getting appropriate follow-up. BELINDA POST BX CLIP PLACEMENT LEFT    Addendum Date: 6/7/2021    ADDENDUM: Radiology/pathology correlation: Pathology results from an ultrasound-guided biopsy performed on a mass in the 12 30 left breast and left axillary lymph node were obtained. Site 1: Mass in the 12 30 left breast.  Results indicate invasive lobular carcinoma, grade 2, as well as LCIS. ER and [place] positive. The result is malignant and concordant. Surgical and oncologic consultation are recommended. Given dense breast tissue and lobular features, contrast-enhanced breast MRI is recommended to evaluate extent of disease. Site 2: Left axillary lymph node. Results demonstrate benign lymphoid tissue. No metastatic carcinoma seen. Nonetheless, sentinel lymph node sampling is recommended at the time of surgery. BIRADS - CATEGORY 6 Known Biopsy-Proven Cancer. Appropriate action should be taken. OVERALL ASSESSMENT - KNOWN BIOPSY PROVEN MALIGNANCY.  BI-RADS 6     Result Date: 6/7/2021  EXAMINATION: ULTRASOUND-GUIDED left BREAST BIOPSY WITH VACUUM-ASSIST Ultrasound-guided left axillary lymph node biopsy ULTRASOUND-GUIDED CLIP PLACEMENT x2 POSTPROCEDURE DIGITAL MAMMOGRAM 6/3/2021 HISTORY: ORDERING SYSTEM PROVIDED HISTORY: Abnormal mammogram TECHNOLOGIST PROVIDED HISTORY: Post breast / lymph node biospy with clip placement Is the patient pregnant?->No COMPARISON: Mammogram dated 05/26/2021. Ultrasound dated 05/26/2021 TECHNIQUE: A timeout was performed to confirm patient identification and site of procedure. Risks, benefits, and alternatives of the procedure were discussed. Informed written consent was obtained. Site 1: Transverse and longitudinal gray scale images were obtained of the hypoechoic mass in the 12 30 left breast.  The biopsy site was prepped in standard fashion. 2% lidocaine was used for local anesthesia (2% lidocaine with epinephrine used for deeper local anesthesia). Small skin nick was made. A 12-gauge, vacuum-assisted biopsy needle was advanced under sonographic guidance via a lateral approach. 6 cores were obtained and the needle was removed. A HydroMARK barrelbiopsy clip was placed at the biopsy site under ultrasound guidance. Pressure was applied for hemostasis. The patient tolerated the procedure well with no immediate complications. Site 2: Transverse and longitudinal gray scale images were obtained of the lymph node with mild cortical thickening in the left axilla. The biopsy site was prepped in standard fashion. 2% lidocaine was used for local anesthesia (2% lidocaine with epinephrine used for deeper local anesthesia). Small skin nick was made. A 14 gauge coaxial spring-loaded Achieve biopsy needle was advanced under sonographic guidance via a inferolateral approach. 4 cores were obtained and the needle was removed. A HydroMARK barrelbiopsy clip was placed at the biopsy site under ultrasound guidance. Pressure was applied for hemostasis. The patient tolerated the procedure well with no immediate complications.  POSTPROCEDURE MAMMOGRAM: ML and CC views of the left breast were obtained immediately following the procedure. The breast biopsy clip is in the correct location with respect to the targeted site. There is no evidence of biopsy clip migration from the biopsy site. Left axillary biopsy clip is not included in the field of view. Post biopsy clip placement imaging performed in a separate room. 1.  Technically successful ultrasound guided core biopsy of a hypoechoic mass in the 12 30 left breast and HydroMARK barrelclip marker placement as described above. 2.  Technically successful ultrasound-guided core biopsy of a lymph node with mild cortical thickening in the left axilla and HydroMARK barrel clip marker placement as described above. BIRADS: ZW - Pathology pending. US BIOPSY LYMPH NODE Left    Addendum Date: 6/7/2021    ADDENDUM: Radiology/pathology correlation: Pathology results from an ultrasound-guided biopsy performed on a mass in the 12 30 left breast and left axillary lymph node were obtained. Site 1: Mass in the 12 30 left breast.  Results indicate invasive lobular carcinoma, grade 2, as well as LCIS. ER and ME positive. The result is malignant and concordant. Surgical and oncologic consultation are recommended. Given dense breast tissue and lobular features, contrast-enhanced breast MRI is recommended to evaluate extent of disease. Site 2: Left axillary lymph node. Results demonstrate benign lymphoid tissue. No metastatic carcinoma seen. Nonetheless, sentinel lymph node sampling is recommended at the time of surgery. BIRADS - CATEGORY 6 Known Biopsy-Proven Cancer. Appropriate action should be taken. OVERALL ASSESSMENT - KNOWN BIOPSY PROVEN MALIGNANCY.  BI-RADS 6     Result Date: 6/7/2021  EXAMINATION: ULTRASOUND-GUIDED left BREAST BIOPSY WITH VACUUM-ASSIST Ultrasound-guided left axillary lymph node biopsy ULTRASOUND-GUIDED CLIP PLACEMENT x2 POSTPROCEDURE DIGITAL MAMMOGRAM 6/3/2021 HISTORY: ORDERING SYSTEM PROVIDED HISTORY: Abnormal mammogram TECHNOLOGIST PROVIDED HISTORY: Post breast / lymph node biospy with clip placement Is the patient pregnant?->No COMPARISON: Mammogram dated 05/26/2021. Ultrasound dated 05/26/2021 TECHNIQUE: A timeout was performed to confirm patient identification and site of procedure. Risks, benefits, and alternatives of the procedure were discussed. Informed written consent was obtained. Site 1: Transverse and longitudinal gray scale images were obtained of the hypoechoic mass in the 12 30 left breast.  The biopsy site was prepped in standard fashion. 2% lidocaine was used for local anesthesia (2% lidocaine with epinephrine used for deeper local anesthesia). Small skin nick was made. A 12-gauge, vacuum-assisted biopsy needle was advanced under sonographic guidance via a lateral approach. 6 cores were obtained and the needle was removed. A HydroMARK barrelbiopsy clip was placed at the biopsy site under ultrasound guidance. Pressure was applied for hemostasis. The patient tolerated the procedure well with no immediate complications. Site 2: Transverse and longitudinal gray scale images were obtained of the lymph node with mild cortical thickening in the left axilla. The biopsy site was prepped in standard fashion. 2% lidocaine was used for local anesthesia (2% lidocaine with epinephrine used for deeper local anesthesia). Small skin nick was made. A 14 gauge coaxial spring-loaded Achieve biopsy needle was advanced under sonographic guidance via a inferolateral approach. 4 cores were obtained and the needle was removed. A HydroMARK barrelbiopsy clip was placed at the biopsy site under ultrasound guidance. Pressure was applied for hemostasis. The patient tolerated the procedure well with no immediate complications. POSTPROCEDURE MAMMOGRAM: ML and CC views of the left breast were obtained immediately following the procedure. The breast biopsy clip is in the correct location with respect to the targeted site.  There is no evidence of biopsy clip migration from the biopsy site. Left axillary biopsy clip is not included in the field of view. Post biopsy clip placement imaging performed in a separate room. 1.  Technically successful ultrasound guided core biopsy of a hypoechoic mass in the 12 30 left breast and HydroMARK barrelclip marker placement as described above. 2.  Technically successful ultrasound-guided core biopsy of a lymph node with mild cortical thickening in the left axilla and HydroMARK barrel clip marker placement as described above. BIRADS: ZW - Pathology pending. US BREAST BIOPSY W LOC DEVICE 1ST LESION LEFT    Addendum Date: 6/7/2021    ADDENDUM: Radiology/pathology correlation: Pathology results from an ultrasound-guided biopsy performed on a mass in the 12 30 left breast and left axillary lymph node were obtained. Site 1: Mass in the 12 30 left breast.  Results indicate invasive lobular carcinoma, grade 2, as well as LCIS. ER and VT positive. The result is malignant and concordant. Surgical and oncologic consultation are recommended. Given dense breast tissue and lobular features, contrast-enhanced breast MRI is recommended to evaluate extent of disease. Site 2: Left axillary lymph node. Results demonstrate benign lymphoid tissue. No metastatic carcinoma seen. Nonetheless, sentinel lymph node sampling is recommended at the time of surgery. BIRADS - CATEGORY 6 Known Biopsy-Proven Cancer. Appropriate action should be taken. OVERALL ASSESSMENT - KNOWN BIOPSY PROVEN MALIGNANCY.  BI-RADS 6     Result Date: 6/7/2021  EXAMINATION: ULTRASOUND-GUIDED left BREAST BIOPSY WITH VACUUM-ASSIST Ultrasound-guided left axillary lymph node biopsy ULTRASOUND-GUIDED CLIP PLACEMENT x2 POSTPROCEDURE DIGITAL MAMMOGRAM 6/3/2021 HISTORY: ORDERING SYSTEM PROVIDED HISTORY: Abnormal mammogram TECHNOLOGIST PROVIDED HISTORY: Post breast / lymph node biospy with clip placement Is the patient pregnant?->No COMPARISON: Mammogram dated 05/26/2021. Ultrasound dated 05/26/2021 TECHNIQUE: A timeout was performed to confirm patient identification and site of procedure. Risks, benefits, and alternatives of the procedure were discussed. Informed written consent was obtained. Site 1: Transverse and longitudinal gray scale images were obtained of the hypoechoic mass in the 12 30 left breast.  The biopsy site was prepped in standard fashion. 2% lidocaine was used for local anesthesia (2% lidocaine with epinephrine used for deeper local anesthesia). Small skin nick was made. A 12-gauge, vacuum-assisted biopsy needle was advanced under sonographic guidance via a lateral approach. 6 cores were obtained and the needle was removed. A HydroMARK barrelbiopsy clip was placed at the biopsy site under ultrasound guidance. Pressure was applied for hemostasis. The patient tolerated the procedure well with no immediate complications. Site 2: Transverse and longitudinal gray scale images were obtained of the lymph node with mild cortical thickening in the left axilla. The biopsy site was prepped in standard fashion. 2% lidocaine was used for local anesthesia (2% lidocaine with epinephrine used for deeper local anesthesia). Small skin nick was made. A 14 gauge coaxial spring-loaded Achieve biopsy needle was advanced under sonographic guidance via a inferolateral approach. 4 cores were obtained and the needle was removed. A HydroMARK barrelbiopsy clip was placed at the biopsy site under ultrasound guidance. Pressure was applied for hemostasis. The patient tolerated the procedure well with no immediate complications. POSTPROCEDURE MAMMOGRAM: ML and CC views of the left breast were obtained immediately following the procedure. The breast biopsy clip is in the correct location with respect to the targeted site. There is no evidence of biopsy clip migration from the biopsy site. Left axillary biopsy clip is not included in the field of view.  Post biopsy clip placement imaging performed in a separate room. 1.  Technically successful ultrasound guided core biopsy of a hypoechoic mass in the 12 30 left breast and HydroMARK barrelclip marker placement as described above. 2.  Technically successful ultrasound-guided core biopsy of a lymph node with mild cortical thickening in the left axilla and HydroMARK barrel clip marker placement as described above. BIRADS: ZW - Pathology pending. BELINDA COOKIE DIGITAL DIAGNOSTIC UNILATERAL LEFT    Result Date: 5/26/2021  EXAMINATION: DIAGNOSTIC DIGITAL LEFT BREAST MAMMOGRAM WITH TOMOSYNTHESIS; TARGETED ULTRASOUND OF THE LEFT BREAST, 5/26/2021 8:24 am TECHNIQUE: Diagnostic mammography of the left breast was performed with tomosynthesis. 2D standard and 3D tomosynthesis combination imaging performed through the left breast.  Computer aided detection was utilized in the interpretation of this exam.; Target ultrasound of the left breast was performed. Views: Compression in the CC and MLO views with tomosynthesis. True lateral view with tomosynthesis. COMPARISON: 05/13/2021, 04/24/2019 HISTORY: ORDERING SYSTEM PROVIDED HISTORY: Breast asymmetry in female TECHNOLOGIST PROVIDED HISTORY: left breast asymmetry/ dense breast tissue / personal family hx breast ca in mother/-Mgrandmother Is the patient pregnant?->No FINDINGS: DIAGNOSTIC MAMMOGRAM: Heterogeneously dense breast parenchyma. The suspected mass remains visible in the 12 o'clock location with associated distortion. No microcalcification in this area. TARGETED ULTRASOUND: Targeted ultrasound was performed in the superior breast, revealing an irregular hypoechoic mass measuring 1.7 x 1.3 x 1.2 cm with spiculated margins, located in the 12:30 position 5 cm from the nipple. Imaging of the left axilla reveals a lymph node with focal cortical thickening measuring 4 mm. 1. Suspicious spiculated mass measuring 1.7 cm in the 12 o'clock left breast is demonstrated.   Ultrasound-guided biopsy of this mass is recommended. 2. Left axillary lymph node with focal cortical thickening, for which ultrasound-guided biopsy is recommended. I discussed these findings and recommendations with the patient in person at the time of imaging. BI-RADS 5 BIRADS: BIRADS - CATEGORY 5 Highly Suggestive for Malignancy. Biopsy should be considered at this time. OVERALL ASSESSMENT - HIGHLY SUGGESTIVE OF MALIGNANCY. A letter of notification will be sent to the patient regarding the results. My findings and recommendations were discussed with the patient at the time of service. A representative from the radiology department will be contacting your office and assisting the patient in getting appropriate follow-up. Impression:  Invasive lobular carcinoma of left breast, grade 2, ER positive strongly, DC positive moderate, HER-2 not amplified, clinical stage T1c, N0, M0  Left breast LCIS  Axillary lymph node cortical thickening  Premenopausal status    Plan:  I had a detailed discussion with the patient and personally went over results of lab work-up imaging studies and other relevant clinical data. Patient's case was also discussed in comprehensive multidisciplinary breast clinic. We reviewed images of patient mammogram and ultrasound. Patient is scheduled for MRI of the breast.  I will follow up on results. This will also help reevaluate the axilla which looks suspicious on ultrasound but the biopsy was negative  At this point clinically patient has a clinical stage T1c N0 M0 lobular invasive carcinoma of the left breast.  Do not recommend neoadjuvant hormonal therapy. Okay to proceed with surgery  Patient will benefit from adjuvant hormonal therapy. Given premenopausal status patient would be a candidate for tamoxifen.   At this point we are not recommending ovarian suppression and aromatase inhibitor however if Oncotype comes back as high risk and patient ends up having chemo we will rediscuss type of adjuvant hormone therapy. I also reviewed role of adjuvant chemotherapy. Patient will undergo Oncotype testing to assess for benefit from adjuvant chemotherapy further recommendations will depend upon recurrence score. I will order hormonal status to document premenopausal status  Patient also has not had a blood work-up done for a while. I will obtain CBC CMP  NCCN guidelines were reviewed and discussed with the patient. The diagnosis and care plan were discussed with the patient in detail. I discussed the natural history of the disease, prognosis, risks and goals of therapy and answered all the patients questions to the best of my ability. Patient expressed understanding and was in agreement. Breanna Stahl MD      I spent more than 80 minutes examining, evaluating, reviewing data, counseling the patient and coordinating care. Greater than 50% of time was spent face-to-face with the patient this note is created with the assistance of a speech recognition program.  While intending to generate a document that actually reflects the content of the visit, the document can still have some errors including those of syntax and sound a like substitutions which may escape proof reading. It such instances, actual meaning can be extrapolated by contextual diversion.

## 2021-06-19 LAB
ESTRADIOL LEVEL: 47 PG/ML (ref 27–314)
FOLLICLE STIMULATING HORMONE: 75.2 U/L (ref 1.7–21.5)

## 2021-06-21 PROBLEM — Z17.0 MALIGNANT NEOPLASM OF UPPER-OUTER QUADRANT OF LEFT BREAST IN FEMALE, ESTROGEN RECEPTOR POSITIVE (HCC): Status: ACTIVE | Noted: 2021-06-16

## 2021-06-21 PROBLEM — C50.412 MALIGNANT NEOPLASM OF UPPER-OUTER QUADRANT OF LEFT BREAST IN FEMALE, ESTROGEN RECEPTOR POSITIVE (HCC): Status: ACTIVE | Noted: 2021-06-16

## 2021-06-21 NOTE — CONSULTS
[facility]            Radiation Oncology          [address] [facility]          [address]        Aj Tinajero: [phone]        F: [phone]       [email or web address]           2021    Patient: Antwan Zarate   YOB: 1970       Dear Dr Johnny Lewis:    Thank you for referring Antwan Zarate to me for evaluation. Below are the relevant portions of my assessment and plan of care. If you have questions, please do not hesitate to call me. I look forward to following this patient along with you.      Sincerely,    Electronically signed by Nighat Ramirez MD on 21 at 11:01 AM EDT    CC: Patient Care Team:  Joslyn Huang MD as PCP - General (Family Medicine)  Joslyn Huang MD as PCP - [facility]  Brandon Maki RN as Nurse Navigator (Oncology)  ------------------------------------------------------------------------------------------------------------------------------------------------------------------------------------------      RADIATION ONCOLOGY NEW PATIENT VISIT:    Date of Service: 2021    Location:  [place] [address] Oncology,   [address] [facility] [address] [place]   [phone]     Patient ID:   Antwan Zarate  : 1970   MRN: [id number]    CHIEF COMPLAINT: \" Left breast cancer\"    DIAGNOSIS:  Cancer Staging  Malignant neoplasm of upper-outer quadrant of left breast in female, estrogen receptor positive ([facility] [address]  Staging form: Breast, AJCC 8th Edition  - Clinical stage from 6/3/2021: Stage IA (cT1c, cN0, cM0, G2, ER+, OK+, HER2-) - Signed by Nighat Ramirez MD on 2021      HISTORY OF PRESENT ILLNESS:   Antwan Zarate is a 45 y.o. female with a recent mammogram finding of an abnormality in the left breast.  Patient previously had a normal mammogram 2 years ago however she did not have one last year and her mammogram on May 13, 2021 noted a 1.7 cm lesion in the left upper outer quadrant breast.  Patient underwent ultrasound and biopsy on Yaa 3, 2021 which confirmed a invasive lobular carcinoma ER/ME positive HER-2 negative. Patient also had an axillary lymph node that was biopsied due to cortical thickening though it was negative. Patient on her mammogram and was noted to have dense breast tissue. She does have a family history of breast cancer in her mother in her 62s as well as distant cousins with breast cancer. She also had 2 aunts with cancer including one with renal cell and the other with an unknown. Patient denies any symptoms at this time of headaches, dizziness, chest pain, shortness of breath, dumping, nausea, weight loss, fatigue, bony pain, swelling, or bleeding. OBGYN HISTORY: (Breast/GYN only)    menarche 14 menopause NA  Oral contraceptive use: NA  Hormone replacement therapy use: NA      PRIOR RADIATION HISTORY:  No prior history of radiation therapy    PACEMAKER: None    PAST MEDICAL HISTORY:  Past Medical History:   Diagnosis Date    Breast cancer ([facility] [address]        PAST SURGICAL HISTORY:  Past Surgical History:   Procedure Laterality Date    US BREAST NEEDLE BIOPSY LEFT Left 6/3/2021    US BREAST NEED[facility]    US LYMPH NODE BIOPSY  6/3/2021    US [facility]       MEDICATIONS:  No current outpatient medications on file.     ALLERGIES:   No Known Allergies    SOCIAL HISTORY:  Social History     Socioeconomic History    Marital status:      Spouse name: Not on file    Number of children: Not on file    Years of education: Not on file    Highest education level: Not on file   Occupational History    Not on file   Tobacco Use    Smoking status: Never Smoker    Smokeless tobacco: Never Used   Substance and Sexual Activity    Alcohol use: No    Drug use: No    Sexual activity: Yes     Partners: Male   Other Topics Concern    Not on file   Social History Narrative    Not on file     Social buttock, thigh or calf cramps. No joint or muscle pain. SKIN: The patient denies easy bruising, skin redness, skin rash, hives. NEUROLOGIC: The patient denies headache, dizziness, fainting, muscle spasm, loss of consciousness. ENDOCRINE: The patient denies intolerance to hot or cold temperature, flushing. HEMATOLOGIC/LYMPHATIC: The patient denies anemia, bleeding tendency or clotting tendency. ALLERGIC/IMMUNOLOGIC: The patient denies rhinitis, asthma, skin sensitivity. PYSCHOLOGIC: The patient denies any depression, anxiety, or confusion. A full 14 point review of systems was performed and assessed and found to be negative except as noted above. PHYSICAL EXAMINATION:    CHAPERONE: Family/friend/companieon Present    ECO Asymptomatic    VITAL SIGNS: /68   Pulse 61   Temp 98.2 °F (36.8 °C) (Oral)   Resp 16   Ht 5' 9\" (1.753 m)   Wt 139 lb 14.4 oz (63.5 kg)   LMP 2021   BMI 20.66 kg/m²   GENERAL:  General appearance is that of a well-nourished, well-developed in no apparent distress. HEENT: Normocephalic, atraumatic, EOMI, moist mucosa, no erythema. NECK:  No adenopathy or a palpable thyroid mass, trachea is midline. LYMPHATICS: No cervical, supraclavicular adenopathy. HEART:  Regular rate and rhythm, S1, S2, no murmurs. LUNGS:  Clear to auscultation bilaterally with no wheezing or crackles. ABDOMEN:  Soft, nontender, non distended. EXTREMITIES:  No clubbing, cyanosis, or edema. No calf tenderness. MSK:  No CVA or spinal tenderness. NEUROLOGICAL: No focal deficits. CN II-XII intact. Strength and sensation intact bilaterally. SKIN: No erythema or desquamation.   BREAST: Deferred      LABS:  WBC   Date Value Ref Range Status   2021 7.4 3.5 - 11.0 k/uL Final     Segs Absolute   Date Value Ref Range Status   2021 4.90 1.8 - 7.7 k/uL Final     Hemoglobin   Date Value Ref Range Status   2021 15.1 12.0 - 16.0 g/dL Final     Platelets   Date Value Ref Range Status   2021 253 140 - 450 k/uL Final     No results found for: , CEA  No results found for:   No results found for: PSA      IMAGIN21 Mammo  Impression   1. Suspicious spiculated mass measuring 1.7 cm in the 12 o'clock left breast   is demonstrated.  Ultrasound-guided biopsy of this mass is recommended.       2. Left axillary lymph node with focal cortical thickening, for which   ultrasound-guided biopsy is recommended.       I discussed these findings and recommendations with the patient in person at   the time of imaging.       BI-RADS 5         PATHOLOGY:  6/3/21 Biopsy  Final Diagnosis     SPECIMEN \"A\":  BREAST, LEFT, 12:30 O'CLOCK, ULTRASOUND-GUIDED BIOPSY:          INVASIVE LOBULAR CARCINOMA, GRADE 2     LOBULAR CARCINOMA IN SITU (LCIS) PRESENT     ER POSITIVE, >90%, STRONG     ME POSITIVE, 50%, MODERATE TO STRONG     HER2 PENDING     SPECIMEN \"B\":  LYMPH NODE, LEFT AXILLARY, ULTRASOUND-GUIDED BIOPSY:          BENIGN LYMPHOID TISSUE, NO METASTATIC CARCINOMA SEEN     Diagnosis Comment   Specimen \"A\":  Tumor cells involve multiple cores, with longest   contiguous of 0.7 cm.  Glandular formation score 3, nuclear   pleomorphism score 2, mitotic rate score 1, histologic rate 2.  Tumor   cells with loss of E-cadherin immunostain, consistent with lobular   origin. ASSESSMENT AND PLAN:   Coby Bradshawite is a 45 y.o. female with a Cancer Staging  Malignant neoplasm of upper-outer quadrant of left breast in female, estrogen receptor positive ([facility] [address]  Staging form: Breast, AJCC 8th Edition  - Clinical stage from 6/3/2021: Stage IA (cT1c, cN0, cM0, G2, ER+, ME+, HER2-) - Signed by Kvng Da Silva MD on 2021    We reviewed with the patient and family the testing that has been done so far, including imaging and pathology results. We also reviewed their staging based on the testing that as been done and the recommendations per the NCCN guidelines.  We discussed the options of treatment, including surgery, chemotherapy, and radiation, and the rationale of why radiation therapy would be indicated for this diagnosis. We also discussed that they have the option to not pursue our recommended treatment as well. We reviewed that given her early diagnosis, patient would be a good candidate for breast conservation surgery followed by adjuvant radiation therapy. However given her young age and dense breast tissue a MRI would be recommended further evaluation. Patient will also be meeting with plastic surgery for oncologic reconstruction. Patient will also see medical oncology to discuss adjuvant therapy. We reviewed the logistics of radiation treatment planning, including a CT Simulation session, as well as daily treatments for approximately 3-4 weeks. With regards to radiation to the left breast, I discussed the possible short-term side effects of fatigue, skin irritation (causing redness, dryness, or peeling), swelling and tenderness of the left breast, tiredness, low blood counts (causing infection or bleeding) and hair loss in treated area. Possible long-term side effects discussed included change in the cosmetic appearance of the left breast (change in shape, size, and texture of the breast), hyper/hypo-pigmentation of the skin, limited range of motion of the left shoulder, scarring of the lung (causing shortness of breath and cough), damage to the heart, swelling of the left arm i.e. lymphedema (causing pain), damage to the nerves (causing numbness, weakness, or paralysis) and rib fracture. We discussed the use of deep inspirative breath holding techniques during planning and treatment to help reduce the risk of toxicities to the heart. After ample time to review the patient's questions, patient will be referred for her MRI of the breast today and will be seen by us after surgery to review her final pathology and final treatment recommendations.     Patient was in agreement

## 2021-06-22 ENCOUNTER — HOSPITAL ENCOUNTER (OUTPATIENT)
Dept: WOMENS IMAGING | Age: 51
Discharge: HOME | End: 2021-06-24
Payer: COMMERCIAL

## 2021-06-22 DIAGNOSIS — R92.8 ABNORMAL MRI, BREAST: ICD-10-CM

## 2021-06-22 PROCEDURE — 76642 ULTRASOUND BREAST LIMITED: CPT

## 2021-06-24 ENCOUNTER — TELEPHONE (OUTPATIENT)
Dept: ONCOLOGY | Age: 51
End: 2021-06-24

## 2021-06-24 NOTE — TELEPHONE ENCOUNTER
Hadley Dunaway called with updates from mri. Area of concern on left breast lower outer quadrant requiring mri biopsy, unable to locate area on ultrasound. Mri biopsy is being scheduled. Plan is for 7/1/21 but pending. We discussed possible scenario's. We discussed how chemotherapy will be determined, and that radiation therapy may or may not be needed if mastectomy is performed. We talked about her labs. Discussed that menopause/post menopausal status will effect her oral medication (endocrine therapy). We discussed that genetics may be back around the same time that 2nd biopsy results are back. We decided to cancel med onc follow up since surgery is no longer 7/1/21 as originally planned. Hadley Dunaway will call office to let them know that we will need to reschedule for 2 -3 weeks after new surgery date once we get that. She is also going to move out her follow up with Dr. Gary Smith until after the biopsy so that she will have results from biopsy to discuss with Dr. Gary Smith to see if surgical plan needs to change. We discussed leave paperwork. I can use the same forms that Hadley Dunaway gave me, but it will be a personal leave not a FMLA leave. Answered patient's questions at this time. Pt on pending.

## 2021-06-25 ENCOUNTER — TELEPHONE (OUTPATIENT)
Dept: ONCOLOGY | Age: 51
End: 2021-06-25

## 2021-06-25 NOTE — TELEPHONE ENCOUNTER
received referral from Nurse Navigator.  called patient to introduce self and services. Katty Handley states she is doing well despite a delay in her treatment plan. Patient asking about support groups in the area.  went over various support group options with patient.  provided contact information and encouraged her to call with further questions.

## 2021-06-28 ENCOUNTER — TELEPHONE (OUTPATIENT)
Dept: ONCOLOGY | Age: 51
End: 2021-06-28

## 2021-06-28 NOTE — TELEPHONE ENCOUNTER
Magen Frye called and relates she has a new lump under her left axilla. Her previous biopsy was done 6/3/21. This is new and noted this weekend. No redness, no real pain, no drainage or increased temp to skin in area. Since it has been some time since initial biopsy so I would like to contact surgeon for her recommendation. Called and left message for Kristin Ashley at Dr. Gayla Kramer office  Kristin Ashley returned my call and she will give Dr. Danie Hale the message this afternoon. Pt on pending.

## 2021-07-01 ENCOUNTER — TELEPHONE (OUTPATIENT)
Dept: ONCOLOGY | Age: 51
End: 2021-07-01

## 2021-07-01 ENCOUNTER — HOSPITAL ENCOUNTER (OUTPATIENT)
Dept: MRI IMAGING | Age: 51
Discharge: HOME | End: 2021-07-03
Payer: COMMERCIAL

## 2021-07-01 ENCOUNTER — HOSPITAL ENCOUNTER (OUTPATIENT)
Dept: MAMMOGRAPHY | Age: 51
Discharge: HOME | End: 2021-07-03
Payer: COMMERCIAL

## 2021-07-01 DIAGNOSIS — Z98.890 STATUS POST BREAST BIOPSY: ICD-10-CM

## 2021-07-01 DIAGNOSIS — N63.20 LEFT BREAST MASS: ICD-10-CM

## 2021-07-01 PROCEDURE — 19085 BX BREAST 1ST LESION MR IMAG: CPT

## 2021-07-01 PROCEDURE — 6360000004 HC RX CONTRAST MEDICATION: Performed by: SURGERY

## 2021-07-01 PROCEDURE — 88305 TISSUE EXAM BY PATHOLOGIST: CPT

## 2021-07-01 PROCEDURE — 77065 DX MAMMO INCL CAD UNI: CPT

## 2021-07-01 PROCEDURE — A9579 GAD-BASE MR CONTRAST NOS,1ML: HCPCS | Performed by: SURGERY

## 2021-07-01 RX ADMIN — GADOTERIDOL 15 ML: 279.3 INJECTION, SOLUTION INTRAVENOUS at 14:28

## 2021-07-01 NOTE — TELEPHONE ENCOUNTER
Oncotype order, demographics, insurance info, last progress note, and path report faxed to [facility] at [phone]. Copy scanned into chart. Triage to f/u tomorrow to make sure order received.

## 2021-07-01 NOTE — TELEPHONE ENCOUNTER
Left message for Janusz Chua notifying her that her genetic test results are available. Requested that she return my phone call at her earliest convenience to review results.

## 2021-07-02 NOTE — TELEPHONE ENCOUNTER
[facility]   Hereditary Cancer Risk Assessment     Name: Mark Pascal  YOB: 1970  Date of Results Disclosure: 7/1/21    HISTORY   Ms. Emi Malik was seen for genetic counseling at the request of Uzma Berman DO due to her personal and family history of cancer. At that time, Ms. Emi Malik chose to pursue genetic testing via the CancerNext Expanded + RNA gene panel. These results were discussed with Ms. Emi Malik via telephone. A summary of Ms. Hayes's results and recommendations are below. RESULTS  [facility] CancerNext-Expanded Panel + RNAinsight: NEGATIVE - NO CLINICALLY SIGNIFICANT MUTATIONS DETECTED   This panel included the analysis of 77 genes associated with hereditary cancer including: AIP, ALK, APC, SAMPSON, BAP1, BARD1, BLM, BMPR1A, BRCA1, BRCA2, BRIP1, CDC73, CDH1, CDK4, CDKN1B, CDKN2A, CHEK2, CTNNA1, DICER1, EGFR, EGLN1, EPCAM, FANCC, FH, FLCN, GALNT12, GREM1, HOXB13, KIF1B, KIT1, LZTR1, MAX, MEN1, MET, MITF, MLH1, MSH2, MSH3, MSH6, MUTYH, NBN, NF1, NF2, NTHL1, PALB2, PDGFRA, PHOX2B, PMS2, POLD1, POLE, POT1, CBEGE3H, PTCH1, PTEN, RAD51C, RAD51D, RB1, RECQL, RET, SDHA, SDHAF2, SDHB, SDHC, ,SDHD, SMAD4, SMARCA4, SMARCB1, SMARCE1, STK11, SUFU, LSUC754, TP53, TSC1, TSC2, VHL, and XRCC2. In addition, no clinically relevant aberrant RNA transcripts were detected in select genes. Please refer to genetic test report for technical details. We discussed that Ms. Hayes's negative test result greatly reduces the likelihood that her personal history of cancer is due to a hereditary mutation. It is possible that her personal history of cancer may be due to a gene for which testing was not performed or which has yet to be discovered. RECOMMENDATIONS  1) The outcome of Ms. Eagle genetic test results do not affect her current cancer treatment. Ms. Emi Malik should continue cancer treatment and surveillance as directed by her physicians. 2) Ms. Emi Malik should continue general population cancer screening guidelines as directed by her physicians. RECOMMENDATIONS FOR FAMILY MEMBERS   1) Genetic testing is not recommended for Ms. Hayes's children based on her negative test results. However, this recommendation does not take into consideration any family history of cancer in their paternal family. 2) It is possible that the cancers in Ms. Lams family are due to a hereditary gene mutation that she did not inherit. Therefore, her relatives (particularly those with a current or previous cancer diagnosis) may consider genetic counseling and testing to clarify this possibility. Relatives may contact the [address] [place] [address] [employer] [facility] at [phone] or locate a genetic counselor at [email or web address].     3) We encourage Ms. Lams relatives to discuss their family history of cancer with their physicians to determine the most appropriate cancer screening recommendations. Ms. [place] Campo female relatives may benefit from a formal breast cancer risk assessment by the Humza Buttner or Krystal Dart risk models to determine if additional breast cancer screening is warranted. SUMMARY & PLAN   1) Ms. Eagle genetic test results are negative meaning there were no clinically significant mutations detected in the 77 genes analyzed. 2) There are no changes in medical management for Ms. Mckay Seo based on her negative genetic test results. 3) We encourage Ms. Mckay Seo to contact us every 1-2 years to determine if there are any new genetic testing or research options available. 4) We encourage Ms. Mckay Seo to contact us with updates to her personal and/or familys cancer history as this information may alter our assessment and/or recommendations. The [employer] [facility] [facility] [employer] [facility] would be glad to offer our assistance should you have any questions or concerns about this information. Please feel free to contact us at [phone].        Jose David Menezes. Josesito Griffin MS, [facility]   Licensed Genetic Counselor         CC:  Ms. Ata Denton

## 2021-07-02 NOTE — TELEPHONE ENCOUNTER
Call placed to Oncotype to verify order was rec'd. Spoke with rep who states it has not been rec'd yet. Re-faxed to [phone], paper confirmation rec'd.

## 2021-07-06 LAB — SURGICAL PATHOLOGY REPORT: NORMAL

## 2021-07-09 ENCOUNTER — TELEPHONE (OUTPATIENT)
Dept: ONCOLOGY | Age: 51
End: 2021-07-09

## 2021-07-09 NOTE — TELEPHONE ENCOUNTER
Noted that oncotype dx testing was sent on 7/1/21. Specimen sent was from biopsy. Surgery is pending. Dr. Shayna Madden notified.

## 2021-07-14 ENCOUNTER — TELEPHONE (OUTPATIENT)
Dept: SURGERY | Age: 51
End: 2021-07-14

## 2021-07-14 NOTE — TELEPHONE ENCOUNTER
Patient procedure has been scheduled, Isadora Iniguez from Dr. Marie Tucker office will contact the patient to provide her information about the procedure

## 2021-07-15 ENCOUNTER — TELEPHONE (OUTPATIENT)
Dept: ONCOLOGY | Age: 51
End: 2021-07-15

## 2021-07-20 ENCOUNTER — ANESTHESIA EVENT (OUTPATIENT)
Dept: OPERATING ROOM | Age: 51
End: 2021-07-20
Payer: COMMERCIAL

## 2021-07-20 ENCOUNTER — HOSPITAL ENCOUNTER (OUTPATIENT)
Age: 51
Setting detail: OUTPATIENT SURGERY
Discharge: HOME | End: 2021-07-20
Attending: SURGERY | Admitting: SURGERY
Payer: COMMERCIAL

## 2021-07-20 ENCOUNTER — ANESTHESIA (OUTPATIENT)
Dept: OPERATING ROOM | Age: 51
End: 2021-07-20
Payer: COMMERCIAL

## 2021-07-20 ENCOUNTER — HOSPITAL ENCOUNTER (OUTPATIENT)
Dept: NUCLEAR MEDICINE | Age: 51
Discharge: HOME | End: 2021-07-22
Payer: COMMERCIAL

## 2021-07-20 VITALS — OXYGEN SATURATION: 99 % | SYSTOLIC BLOOD PRESSURE: 102 MMHG | DIASTOLIC BLOOD PRESSURE: 62 MMHG | TEMPERATURE: 82.2 F

## 2021-07-20 VITALS
TEMPERATURE: 97.7 F | SYSTOLIC BLOOD PRESSURE: 113 MMHG | DIASTOLIC BLOOD PRESSURE: 76 MMHG | HEIGHT: 68 IN | HEART RATE: 66 BPM | OXYGEN SATURATION: 99 % | BODY MASS INDEX: 21.68 KG/M2 | WEIGHT: 143.06 LBS | RESPIRATION RATE: 20 BRPM

## 2021-07-20 DIAGNOSIS — C50.912 MALIGNANT NEOPLASM OF LEFT FEMALE BREAST, UNSPECIFIED ESTROGEN RECEPTOR STATUS, UNSPECIFIED SITE OF BREAST (HCC): ICD-10-CM

## 2021-07-20 DIAGNOSIS — G89.18 POSTOPERATIVE PAIN: Primary | ICD-10-CM

## 2021-07-20 LAB — HCG QUALITATIVE: NEGATIVE

## 2021-07-20 PROCEDURE — 2580000003 HC RX 258: Performed by: ANESTHESIOLOGY

## 2021-07-20 PROCEDURE — 3600000002 HC SURGERY LEVEL 2 BASE: Performed by: SURGERY

## 2021-07-20 PROCEDURE — 3600000012 HC SURGERY LEVEL 2 ADDTL 15MIN: Performed by: SURGERY

## 2021-07-20 PROCEDURE — 7100000010 HC PHASE II RECOVERY - FIRST 15 MIN: Performed by: SURGERY

## 2021-07-20 PROCEDURE — 88332 PATH CONSLTJ SURG EA ADD BLK: CPT

## 2021-07-20 PROCEDURE — 7100000001 HC PACU RECOVERY - ADDTL 15 MIN: Performed by: SURGERY

## 2021-07-20 PROCEDURE — 76942 ECHO GUIDE FOR BIOPSY: CPT | Performed by: ANESTHESIOLOGY

## 2021-07-20 PROCEDURE — 3700000000 HC ANESTHESIA ATTENDED CARE: Performed by: SURGERY

## 2021-07-20 PROCEDURE — 88331 PATH CONSLTJ SURG 1 BLK 1SPC: CPT

## 2021-07-20 PROCEDURE — 88307 TISSUE EXAM BY PATHOLOGIST: CPT

## 2021-07-20 PROCEDURE — C9290 INJ, BUPIVACAINE LIPOSOME: HCPCS | Performed by: ANESTHESIOLOGY

## 2021-07-20 PROCEDURE — A9520 TC99 TILMANOCEPT DIAG 0.5MCI: HCPCS | Performed by: SURGERY

## 2021-07-20 PROCEDURE — 88305 TISSUE EXAM BY PATHOLOGIST: CPT

## 2021-07-20 PROCEDURE — 2580000003 HC RX 258: Performed by: SURGERY

## 2021-07-20 PROCEDURE — 6360000002 HC RX W HCPCS: Performed by: SURGERY

## 2021-07-20 PROCEDURE — 6360000002 HC RX W HCPCS: Performed by: NURSE ANESTHETIST, CERTIFIED REGISTERED

## 2021-07-20 PROCEDURE — 7100000000 HC PACU RECOVERY - FIRST 15 MIN: Performed by: SURGERY

## 2021-07-20 PROCEDURE — 2500000003 HC RX 250 WO HCPCS: Performed by: ANESTHESIOLOGY

## 2021-07-20 PROCEDURE — 3700000001 HC ADD 15 MINUTES (ANESTHESIA): Performed by: SURGERY

## 2021-07-20 PROCEDURE — 78195 LYMPH SYSTEM IMAGING: CPT

## 2021-07-20 PROCEDURE — 2500000003 HC RX 250 WO HCPCS: Performed by: SURGERY

## 2021-07-20 PROCEDURE — 2709999900 HC NON-CHARGEABLE SUPPLY: Performed by: SURGERY

## 2021-07-20 PROCEDURE — 7100000011 HC PHASE II RECOVERY - ADDTL 15 MIN: Performed by: SURGERY

## 2021-07-20 PROCEDURE — 2500000003 HC RX 250 WO HCPCS: Performed by: NURSE ANESTHETIST, CERTIFIED REGISTERED

## 2021-07-20 PROCEDURE — 88342 IMHCHEM/IMCYTCHM 1ST ANTB: CPT

## 2021-07-20 PROCEDURE — 6370000000 HC RX 637 (ALT 250 FOR IP): Performed by: SURGERY

## 2021-07-20 PROCEDURE — 15734 MUSCLE-SKIN GRAFT TRUNK: CPT | Performed by: PLASTIC SURGERY

## 2021-07-20 PROCEDURE — 6360000002 HC RX W HCPCS: Performed by: ANESTHESIOLOGY

## 2021-07-20 PROCEDURE — 3430000000 HC RX DIAGNOSTIC RADIOPHARMACEUTICAL: Performed by: SURGERY

## 2021-07-20 PROCEDURE — 84703 CHORIONIC GONADOTROPIN ASSAY: CPT

## 2021-07-20 RX ORDER — FENTANYL CITRATE 50 UG/ML
50 INJECTION, SOLUTION INTRAMUSCULAR; INTRAVENOUS EVERY 5 MIN PRN
Status: DISCONTINUED | OUTPATIENT
Start: 2021-07-20 | End: 2021-07-20 | Stop reason: HOSPADM

## 2021-07-20 RX ORDER — ONDANSETRON 2 MG/ML
4 INJECTION INTRAMUSCULAR; INTRAVENOUS
Status: DISCONTINUED | OUTPATIENT
Start: 2021-07-20 | End: 2021-07-20 | Stop reason: HOSPADM

## 2021-07-20 RX ORDER — GABAPENTIN 100 MG/1
100 CAPSULE ORAL 2 TIMES DAILY
Qty: 14 CAPSULE | Refills: 0 | Status: SHIPPED | OUTPATIENT
Start: 2021-07-20 | End: 2021-08-18 | Stop reason: ALTCHOICE

## 2021-07-20 RX ORDER — SODIUM CHLORIDE, SODIUM LACTATE, POTASSIUM CHLORIDE, CALCIUM CHLORIDE 600; 310; 30; 20 MG/100ML; MG/100ML; MG/100ML; MG/100ML
INJECTION, SOLUTION INTRAVENOUS CONTINUOUS
Status: DISCONTINUED | OUTPATIENT
Start: 2021-07-20 | End: 2021-07-20 | Stop reason: HOSPADM

## 2021-07-20 RX ORDER — FENTANYL CITRATE 50 UG/ML
INJECTION, SOLUTION INTRAMUSCULAR; INTRAVENOUS PRN
Status: DISCONTINUED | OUTPATIENT
Start: 2021-07-20 | End: 2021-07-20 | Stop reason: SDUPTHER

## 2021-07-20 RX ORDER — PHENYLEPHRINE HCL IN 0.9% NACL 1 MG/10 ML
SYRINGE (ML) INTRAVENOUS PRN
Status: DISCONTINUED | OUTPATIENT
Start: 2021-07-20 | End: 2021-07-20 | Stop reason: SDUPTHER

## 2021-07-20 RX ORDER — DOCUSATE SODIUM 100 MG/1
100 CAPSULE, LIQUID FILLED ORAL 2 TIMES DAILY
Qty: 20 CAPSULE | Refills: 0 | Status: SHIPPED | OUTPATIENT
Start: 2021-07-20 | End: 2021-07-30

## 2021-07-20 RX ORDER — IBUPROFEN 600 MG/1
600 TABLET ORAL 4 TIMES DAILY PRN
Qty: 40 TABLET | Refills: 0 | Status: SHIPPED | OUTPATIENT
Start: 2021-07-20 | End: 2021-08-18 | Stop reason: ALTCHOICE

## 2021-07-20 RX ORDER — LIDOCAINE 40 MG/G
CREAM TOPICAL ONCE
Status: COMPLETED | OUTPATIENT
Start: 2021-07-20 | End: 2021-07-20

## 2021-07-20 RX ORDER — DEXAMETHASONE SODIUM PHOSPHATE 10 MG/ML
INJECTION, SOLUTION INTRAMUSCULAR; INTRAVENOUS PRN
Status: DISCONTINUED | OUTPATIENT
Start: 2021-07-20 | End: 2021-07-20 | Stop reason: SDUPTHER

## 2021-07-20 RX ORDER — BUPIVACAINE HYDROCHLORIDE 2.5 MG/ML
INJECTION, SOLUTION EPIDURAL; INFILTRATION; INTRACAUDAL
Status: COMPLETED | OUTPATIENT
Start: 2021-07-20 | End: 2021-07-20

## 2021-07-20 RX ORDER — LIDOCAINE HYDROCHLORIDE 20 MG/ML
INJECTION, SOLUTION EPIDURAL; INFILTRATION; INTRACAUDAL; PERINEURAL PRN
Status: DISCONTINUED | OUTPATIENT
Start: 2021-07-20 | End: 2021-07-20 | Stop reason: SDUPTHER

## 2021-07-20 RX ORDER — CYCLOBENZAPRINE HCL 10 MG
10 TABLET ORAL 3 TIMES DAILY PRN
Qty: 21 TABLET | Refills: 0 | Status: SHIPPED | OUTPATIENT
Start: 2021-07-20 | End: 2021-07-30

## 2021-07-20 RX ORDER — HYDROCODONE BITARTRATE AND ACETAMINOPHEN 5; 325 MG/1; MG/1
1 TABLET ORAL PRN
Status: DISCONTINUED | OUTPATIENT
Start: 2021-07-20 | End: 2021-07-20 | Stop reason: HOSPADM

## 2021-07-20 RX ORDER — FENTANYL CITRATE 50 UG/ML
25 INJECTION, SOLUTION INTRAMUSCULAR; INTRAVENOUS EVERY 5 MIN PRN
Status: DISCONTINUED | OUTPATIENT
Start: 2021-07-20 | End: 2021-07-20 | Stop reason: HOSPADM

## 2021-07-20 RX ORDER — HYDROCODONE BITARTRATE AND ACETAMINOPHEN 5; 325 MG/1; MG/1
2 TABLET ORAL PRN
Status: DISCONTINUED | OUTPATIENT
Start: 2021-07-20 | End: 2021-07-20 | Stop reason: HOSPADM

## 2021-07-20 RX ORDER — PROPOFOL 10 MG/ML
INJECTION, EMULSION INTRAVENOUS CONTINUOUS PRN
Status: DISCONTINUED | OUTPATIENT
Start: 2021-07-20 | End: 2021-07-20 | Stop reason: SDUPTHER

## 2021-07-20 RX ORDER — BUPIVACAINE HYDROCHLORIDE AND EPINEPHRINE 5; 5 MG/ML; UG/ML
INJECTION, SOLUTION EPIDURAL; INTRACAUDAL; PERINEURAL PRN
Status: DISCONTINUED | OUTPATIENT
Start: 2021-07-20 | End: 2021-07-20 | Stop reason: ALTCHOICE

## 2021-07-20 RX ORDER — ROCURONIUM BROMIDE 10 MG/ML
INJECTION, SOLUTION INTRAVENOUS PRN
Status: DISCONTINUED | OUTPATIENT
Start: 2021-07-20 | End: 2021-07-20 | Stop reason: SDUPTHER

## 2021-07-20 RX ORDER — MIDAZOLAM HYDROCHLORIDE 1 MG/ML
INJECTION INTRAMUSCULAR; INTRAVENOUS PRN
Status: DISCONTINUED | OUTPATIENT
Start: 2021-07-20 | End: 2021-07-20 | Stop reason: SDUPTHER

## 2021-07-20 RX ORDER — HYDROCODONE BITARTRATE AND ACETAMINOPHEN 5; 325 MG/1; MG/1
1 TABLET ORAL EVERY 6 HOURS PRN
Qty: 28 TABLET | Refills: 0 | Status: SHIPPED | OUTPATIENT
Start: 2021-07-20 | End: 2021-07-27

## 2021-07-20 RX ADMIN — MIDAZOLAM 2 MG: 1 INJECTION INTRAMUSCULAR; INTRAVENOUS at 08:57

## 2021-07-20 RX ADMIN — Medication 50 MCG: at 10:43

## 2021-07-20 RX ADMIN — DEXAMETHASONE SODIUM PHOSPHATE 10 MG: 10 INJECTION INTRAMUSCULAR; INTRAVENOUS at 09:21

## 2021-07-20 RX ADMIN — Medication 50 MCG: at 08:59

## 2021-07-20 RX ADMIN — ROCURONIUM BROMIDE 10 MG: 10 INJECTION, SOLUTION INTRAVENOUS at 11:24

## 2021-07-20 RX ADMIN — SODIUM CHLORIDE, POTASSIUM CHLORIDE, SODIUM LACTATE AND CALCIUM CHLORIDE: 600; 310; 30; 20 INJECTION, SOLUTION INTRAVENOUS at 10:51

## 2021-07-20 RX ADMIN — ROCURONIUM BROMIDE 20 MG: 10 INJECTION, SOLUTION INTRAVENOUS at 10:16

## 2021-07-20 RX ADMIN — LIDOCAINE 4%: 4 CREAM TOPICAL at 07:15

## 2021-07-20 RX ADMIN — TILMANOCEPT 0.6 MILLICURIE: KIT at 08:10

## 2021-07-20 RX ADMIN — ROCURONIUM BROMIDE 10 MG: 10 INJECTION, SOLUTION INTRAVENOUS at 10:59

## 2021-07-20 RX ADMIN — ROCURONIUM BROMIDE 30 MG: 10 INJECTION, SOLUTION INTRAVENOUS at 08:59

## 2021-07-20 RX ADMIN — SODIUM CHLORIDE, POTASSIUM CHLORIDE, SODIUM LACTATE AND CALCIUM CHLORIDE: 600; 310; 30; 20 INJECTION, SOLUTION INTRAVENOUS at 11:04

## 2021-07-20 RX ADMIN — PROPOFOL 200 MCG/KG/MIN: 10 INJECTION, EMULSION INTRAVENOUS at 08:59

## 2021-07-20 RX ADMIN — SODIUM CHLORIDE, POTASSIUM CHLORIDE, SODIUM LACTATE AND CALCIUM CHLORIDE: 600; 310; 30; 20 INJECTION, SOLUTION INTRAVENOUS at 07:07

## 2021-07-20 RX ADMIN — Medication 100 MCG: at 09:46

## 2021-07-20 RX ADMIN — BUPIVACAINE HYDROCHLORIDE 20 ML: 2.5 INJECTION, SOLUTION EPIDURAL; INFILTRATION; INTRACAUDAL; PERINEURAL at 09:21

## 2021-07-20 RX ADMIN — ROCURONIUM BROMIDE 10 MG: 10 INJECTION, SOLUTION INTRAVENOUS at 10:00

## 2021-07-20 RX ADMIN — PROPOFOL 25 MCG/KG/MIN: 10 INJECTION, EMULSION INTRAVENOUS at 09:12

## 2021-07-20 RX ADMIN — SODIUM CHLORIDE, POTASSIUM CHLORIDE, SODIUM LACTATE AND CALCIUM CHLORIDE: 600; 310; 30; 20 INJECTION, SOLUTION INTRAVENOUS at 12:29

## 2021-07-20 RX ADMIN — BUPIVACAINE 10 ML: 13.3 INJECTION, SUSPENSION, LIPOSOMAL INFILTRATION at 09:21

## 2021-07-20 RX ADMIN — CEFAZOLIN SODIUM 2000 MG: 10 INJECTION, POWDER, FOR SOLUTION INTRAVENOUS at 09:09

## 2021-07-20 RX ADMIN — SUGAMMADEX 150 MG: 100 INJECTION, SOLUTION INTRAVENOUS at 12:02

## 2021-07-20 RX ADMIN — ROCURONIUM BROMIDE 10 MG: 10 INJECTION, SOLUTION INTRAVENOUS at 09:28

## 2021-07-20 RX ADMIN — ROCURONIUM BROMIDE 10 MG: 10 INJECTION, SOLUTION INTRAVENOUS at 10:40

## 2021-07-20 RX ADMIN — LIDOCAINE HYDROCHLORIDE 100 MG: 20 INJECTION, SOLUTION EPIDURAL; INFILTRATION; INTRACAUDAL; PERINEURAL at 08:59

## 2021-07-20 ASSESSMENT — PULMONARY FUNCTION TESTS
PIF_VALUE: 17
PIF_VALUE: 16
PIF_VALUE: 11
PIF_VALUE: 16
PIF_VALUE: 2
PIF_VALUE: 16
PIF_VALUE: 15
PIF_VALUE: 16
PIF_VALUE: 17
PIF_VALUE: 17
PIF_VALUE: 16
PIF_VALUE: 1
PIF_VALUE: 15
PIF_VALUE: 0
PIF_VALUE: 17
PIF_VALUE: 17
PIF_VALUE: 16
PIF_VALUE: 17
PIF_VALUE: 16
PIF_VALUE: 11
PIF_VALUE: 16
PIF_VALUE: 17
PIF_VALUE: 17
PIF_VALUE: 16
PIF_VALUE: 2
PIF_VALUE: 16
PIF_VALUE: 17
PIF_VALUE: 11
PIF_VALUE: 16
PIF_VALUE: 11
PIF_VALUE: 17
PIF_VALUE: 5
PIF_VALUE: 16
PIF_VALUE: 17
PIF_VALUE: 16
PIF_VALUE: 16
PIF_VALUE: 17
PIF_VALUE: 17
PIF_VALUE: 15
PIF_VALUE: 11
PIF_VALUE: 17
PIF_VALUE: 16
PIF_VALUE: 16
PIF_VALUE: 15
PIF_VALUE: 17
PIF_VALUE: 16
PIF_VALUE: 15
PIF_VALUE: 0
PIF_VALUE: 11
PIF_VALUE: 16
PIF_VALUE: 17
PIF_VALUE: 11
PIF_VALUE: 17
PIF_VALUE: 11
PIF_VALUE: 17
PIF_VALUE: 16
PIF_VALUE: 15
PIF_VALUE: 1
PIF_VALUE: 15
PIF_VALUE: 17
PIF_VALUE: 16
PIF_VALUE: 18
PIF_VALUE: 13
PIF_VALUE: 17
PIF_VALUE: 17
PIF_VALUE: 16
PIF_VALUE: 16
PIF_VALUE: 17
PIF_VALUE: 17
PIF_VALUE: 16
PIF_VALUE: 16
PIF_VALUE: 15
PIF_VALUE: 17
PIF_VALUE: 3
PIF_VALUE: 17
PIF_VALUE: 17
PIF_VALUE: 16
PIF_VALUE: 15
PIF_VALUE: 17
PIF_VALUE: 16
PIF_VALUE: 17
PIF_VALUE: 16
PIF_VALUE: 24
PIF_VALUE: 16
PIF_VALUE: 17
PIF_VALUE: 17
PIF_VALUE: 16
PIF_VALUE: 17
PIF_VALUE: 16
PIF_VALUE: 17
PIF_VALUE: 17
PIF_VALUE: 11
PIF_VALUE: 17
PIF_VALUE: 16
PIF_VALUE: 16
PIF_VALUE: 11
PIF_VALUE: 16
PIF_VALUE: 17
PIF_VALUE: 2
PIF_VALUE: 16
PIF_VALUE: 16
PIF_VALUE: 11
PIF_VALUE: 17
PIF_VALUE: 15
PIF_VALUE: 16
PIF_VALUE: 3
PIF_VALUE: 16
PIF_VALUE: 18
PIF_VALUE: 16
PIF_VALUE: 17
PIF_VALUE: 17
PIF_VALUE: 15
PIF_VALUE: 16
PIF_VALUE: 12
PIF_VALUE: 16
PIF_VALUE: 11
PIF_VALUE: 1
PIF_VALUE: 17
PIF_VALUE: 16
PIF_VALUE: 16
PIF_VALUE: 15
PIF_VALUE: 17
PIF_VALUE: 16
PIF_VALUE: 11
PIF_VALUE: 17
PIF_VALUE: 11
PIF_VALUE: 17
PIF_VALUE: 15
PIF_VALUE: 15
PIF_VALUE: 17
PIF_VALUE: 17
PIF_VALUE: 16
PIF_VALUE: 17
PIF_VALUE: 17
PIF_VALUE: 11
PIF_VALUE: 17
PIF_VALUE: 1
PIF_VALUE: 17
PIF_VALUE: 16
PIF_VALUE: 15
PIF_VALUE: 11
PIF_VALUE: 15
PIF_VALUE: 17
PIF_VALUE: 11
PIF_VALUE: 16
PIF_VALUE: 15
PIF_VALUE: 17
PIF_VALUE: 2
PIF_VALUE: 16
PIF_VALUE: 17
PIF_VALUE: 13
PIF_VALUE: 15
PIF_VALUE: 1
PIF_VALUE: 0
PIF_VALUE: 17
PIF_VALUE: 16
PIF_VALUE: 18
PIF_VALUE: 1
PIF_VALUE: 16
PIF_VALUE: 15
PIF_VALUE: 17
PIF_VALUE: 2
PIF_VALUE: 16
PIF_VALUE: 17
PIF_VALUE: 16
PIF_VALUE: 15
PIF_VALUE: 17
PIF_VALUE: 16
PIF_VALUE: 17
PIF_VALUE: 16
PIF_VALUE: 16
PIF_VALUE: 17
PIF_VALUE: 17
PIF_VALUE: 16
PIF_VALUE: 15
PIF_VALUE: 15
PIF_VALUE: 17
PIF_VALUE: 17
PIF_VALUE: 16
PIF_VALUE: 17
PIF_VALUE: 16
PIF_VALUE: 1
PIF_VALUE: 17
PIF_VALUE: 17
PIF_VALUE: 16
PIF_VALUE: 2
PIF_VALUE: 16
PIF_VALUE: 0
PIF_VALUE: 17
PIF_VALUE: 17
PIF_VALUE: 16
PIF_VALUE: 16
PIF_VALUE: 13
PIF_VALUE: 16

## 2021-07-20 ASSESSMENT — PAIN SCALES - GENERAL
PAINLEVEL_OUTOF10: 2
PAINLEVEL_OUTOF10: 3
PAINLEVEL_OUTOF10: 2
PAINLEVEL_OUTOF10: 3

## 2021-07-20 ASSESSMENT — PAIN DESCRIPTION - ORIENTATION
ORIENTATION: LEFT;UPPER
ORIENTATION: LEFT

## 2021-07-20 ASSESSMENT — PAIN DESCRIPTION - LOCATION
LOCATION: CHEST

## 2021-07-20 ASSESSMENT — PAIN - FUNCTIONAL ASSESSMENT
PAIN_FUNCTIONAL_ASSESSMENT: 0-10
PAIN_FUNCTIONAL_ASSESSMENT: 0-10

## 2021-07-20 ASSESSMENT — PAIN DESCRIPTION - DESCRIPTORS
DESCRIPTORS: SORE

## 2021-07-20 NOTE — PROGRESS NOTES
SECOND BREAST SPECIMEN WEIGHT IS 45 GRAMS IN SPECIMEN CUP WITH FORMALIN (SPECIMEN CUP WEIGHT IS 0.005KG)

## 2021-07-20 NOTE — H&P
History and Physical Service   [place] [id number]    HISTORY AND PHYSICAL EXAMINATION            Date of Evaluation: 7/20/2021  Patient name:  Mark Pascal  MRN:   [id number]  YOB: 1970  PCP:    Terrence Muir MD    History Obtained From:     Patient, medical records    History of Present Illness: This is Mark Pascal a 45 y.o. female who presents today for a left breast lumpectomy, sentinel lymph node biopsy with frozen section and possible node dissection by Dr. El Ravi and left breast oncoplastic reconstruction with gerson incision wound vac and pectoral block by  Dr. Ivania Zelaya for left breast carcinoma. Patient had a routine screening mammogram on 5/13/2021 which showed an asymmetrical density in the right upper quadrant of left breast. A diagnostic mammogram on 5/26/2021 revealed a 1.7 x 1.3cm mass and follow up left breast ultrasound also showed left axillary lymph node with focal cortical thickening - highly suggestive for malignancy. She had a lymph node biospy and breast needle biospy on 6/3/2021 which revealed \"invasive lobular carcinoma, grade 2, as well as LCIS. ER and TN positive. \" Axillary lymph node biopsy resulted \"benign lymphoid tissue. No metastatic carcinoma seen. Nonetheless, sentinel lymph node sampling is recommended at the time of surgery. \"  Patient was evaluated by Dr. El Ravi on 6/14/2021 - per note \"core biopsy showed ER+(90%) TN+(50%) HER2-lobular carcinoma grade 2. Dr. El Ravi recommended lumpectomy with additional reconstruction by Dr. Ivania Zelaya which she presents for today. Patient had consultation with Dr. Ivania Zelaya on 6/16/2021 where reconstructive options were discussed which patient will also have today. Patient had bilateral breast MRI after her appointments with Dr. El Ravi and Dr. Ivania Zelaya on 6/18/2021 showing an additional 5.2 mm left breast lower outer location at 4-5 o'clock.  Patient had subsequent biopsy on 7/1/2021 which showed \"organized fat necrosis (fibrosis). Negative for atypia and malignancy. \" Patient states she spoke with Dr. Domenic Ochoa after biopsy and there is no change in surgical plans for lumpectomy. Denies fever, chills, shortness of breath, cough, chest pain, open sores or wounds. Denies hx diabetes. Denies taking any blood thinning medications in the last 10 days. Gynecological History:  Menarche at age 14. Oral contraceptive use for a total of 10 years. . LMP 2021. Patient has family history of breast cancer in mother at age 58. Past Medical History:     Past Medical History:   Diagnosis Date    Breast cancer [facility])         Past Surgical History:     Past Surgical History:   Procedure Laterality Date    MRI BREAST BX USING DEVICE LEFT Left 2021    MRI BREAST BX USING DEVICE LEFT 2021 [facility]    US BREAST NEEDLE BIOPSY LEFT Left 6/3/2021    US BREAST NEEDLE BIOPSY LEFT 6/3/2021 [facility]    US LYMPH NODE BIOPSY  6/3/2021    US LYMPH NODE BIOPSY 6/3/2021 [facility]    WISDOM TOOTH EXTRACTION          Medications Prior to Admission:     Prior to Admission medications    Not on File        Allergies:     Patient has no known allergies. Social History:     Tobacco:    reports that she has never smoked. She has never used smokeless tobacco.  Alcohol:      reports no history of alcohol use. Drug Use:  reports no history of drug use. Family History:     Family History   Problem Relation Age of Onset    Other Father         Gout    Breast Cancer Mother 59        ductal carcinoma lumpectomy w/ radiation   [facility] Migraines Mother     No Known Problems Brother     Breast Cancer Paternal Grandmother [de-identified]    Heart Attack Maternal Grandfather 75       Review of Systems:     Positive and Negative as described in HPI.     CONSTITUTIONAL: negative for fevers, chills, sweats, fatigue, weight loss  HEENT: Wears glassess negative for hearing changes, runny nose, throat pain  RESPIRATORY:  negative for shortness of breath, cough, congestion, wheezing. CARDIOVASCULAR:  negative for chest pain, palpitations. GASTROINTESTINAL:  negative for nausea, vomiting, diarrhea, constipation, change in bowel habits, abdominal pain   GENITOURINARY:  negative for difficulty of urination, burning with urination, frequency   INTEGUMENT:  negative for rash, skin lesions, easy bruising   HEMATOLOGIC/LYMPHATIC:  negative for swelling/edema   ALLERGIC/IMMUNOLOGIC:  negative for urticaria , itching  ENDOCRINE:  negative increase in drinking, increase in urination, hot or cold intolerance  MUSCULOSKELETAL:  negative joint pains, muscle aches, swelling of joints  NEUROLOGICAL:  negative for headaches, dizziness, lightheadedness, numbness, pain, tingling extremities  BEHAVIOR/PSYCH: Anxiety negative for depression    Physical Exam:   /70   Pulse 79   Temp 97.5 °F (36.4 °C) (Temporal)   Resp 18   Ht 5' 8\" (1.727 m)   Wt 143 lb 1 oz (64.9 kg)   LMP 06/18/2021 (Approximate)   SpO2 99%   BMI 21.75 kg/m²   Patient's last menstrual period was 06/18/2021 (approximate). [id number]  No results for input(s): POCGLU in the last 72 hours. General Appearance:  alert, well appearing, and in no acute distress  Mental status: oriented to person, place, and time with normal affect  Head:  normocephalic, atraumatic. Eye:  Wearing glasses no icterus, redness, pupils equal and reactive, extraocular eye movements intact, conjunctiva clear  Ear: normal external ear, no discharge, hearing intact  Nose:  no drainage noted  Mouth: mucous membranes moist  Neck: supple, no carotid bruits, thyroid not palpable  Lungs: Bilateral equal air entry, clear to ausculation, no wheezing, rales or rhonchi, normal effort  Cardiovascular: HR 79 normal rate, regular rhythm, no murmur, gallop, rub.   Abdomen: Soft, nontender, nondistended, normal bowel sounds  Neurologic: There are no new focal motor or sensory deficits, normal muscle tone and bulk, no abnormal sensation, normal speech, cranial nerves II through XII grossly intact  Skin: No gross lesions, rashes, bruising or bleeding on exposed skin area  Extremities:  peripheral pulses palpable, no pedal edema or calf pain with palpation  Psych: normal affect, anxious    Investigations:      Laboratory Testing:  Recent Results (from the past 24 hour(s))   HCG, SERUM, QUALITATIVE    Collection Time: 07/20/21  7:02 AM   Result Value Ref Range    hCG Qual NEGATIVE NEGATIVE       No results for input(s): HGB, HCT, WBC, MCV, PLATELET, NA, K, CL, CO2, BUN, CREATININE, GLUCOSE, INR, PROTIME, APTT, AST, ALT, LABALBU, HCG in the last 720 hours. No results for input(s): COVID19 in the last 720 hours. Imaging/Diagnostics:    MRI BIOPSY BREAST W LOC DEVICE LEFT 1ST LESION    Addendum Date: 7/6/2021    ADDENDUM: Radiology/pathology correlation: Pathology results from an MR guided biopsy performed on a small focus of enhancement in the 4-5 o'clock left breast demonstrates organized fat necrosis (fibrosis). Negative for atypia and malignancy. The result is benign and concordant. However, patient has a known malignancy in the 12 o'clock left breast and appropriate management is recommended. Result Date: 7/6/2021  EXAMINATION: MRI NEEDLE BREAST BIOPSY LEFT 7/1/2021 1:25 pm PROCEDURE: MRI NEEDLE BREAST BX LT 7/1/2021 HISTORY: ORDERING SYSTEM PROVIDED HISTORY: Left breast mass TECHNOLOGIST PROVIDED HISTORY: mri guided biospy of left breast mass Is the patient pregnant?->No Known invasive lobular carcinoma in the 12 o'clock left breast.  Another tiny mass seen on preoperative MRI in the inferior outer left breast. TECHNIQUE: Targeting axial MRI of the left breast with contrast is performed. COMPARISON: MRI dated 06/18/2021. Ultrasound dated 06/22/2021. DESCRIPTION OF PROCEDURE: Informed consent was obtained after a detailed explanation of the procedure including risks, benefits, and alternatives.  Risks including pain, bleeding, infection, allergic reaction, need for further intervention including surgery was explained to the patient. [place] protocol was observed. LESION ADDRESSED:  Small 0.5 cm focus of enhancement in the lower outer left breast, seen on MRI dated 06/18/2021. Targeting axial MRI of the left breast with contrast is performed. Images were sent to the [facility] workstation. The lesion was targeted using [employer] software. Lateral approach was taken. Grid with fiducial method was used on the [email or web address] system to determine grid coordinates of the lesion. The skin was cleansed with chlorhexidine. Buffered 2% lidocaine was used for superficial local anesthesia. 2% lidocaine with epinephrine was used for deeper local anesthesia. A small skin incision was made. Bard Encor guide block was placed. Introducer sheath and trocar were advanced through the guide block to the pre determined depth. Then, the trocar was removed and the obturator was advanced through the introducer sheath. Repeat MRI confirmed good placement. The obturator was removed and the 10 gauge Bard Encor vacuum assist biopsy device was placed. A total of number of 5samples were obtained, directed toward the area of enhancement. Biopsy needle was removed. An M shaped biopsy clip was placed through the introducer sheath. The obturator was replaced, and post biopsy MRI images demonstrate appropriate position of the biopsy clip and biopsy cavity. The introducer sheath and obturator were removed, and pressure was held to achieve hemostasis. The skin nick was dressed with Steri-Strips. The patient tolerated the procedure well without complication. Post procedure mammogram was obtained to document placement of clip. FINDINGS: Post biopsy CC and true lateral views of the left breast were obtained in a separate room. The biopsy clip is in the expected location for the targeted lesion. No evidence of biopsy clip migration.      Technically successful MR guided biopsy of a 0.5 cm focus of enhancement in the lower outer left breast and M shaped clip marker placement, as above. Please await radiology/pathology concordance addendum. US BREAST LIMITED LEFT    Result Date: 6/23/2021  EXAMINATION: TARGETED ULTRASOUND OF THE LEFT BREAST 6/22/2021 COMPARISON: Mammogram dated 5/13/2021, 5/26/2021, 6/3/2021. Ultrasound dated 5/26/2021. MRI dated 6/18/2021. HISTORY: ORDERING SYSTEM PROVIDED HISTORY: Abnormal MRI, breast Known left breast cancer. Additional finding on breast MRI. FINDINGS: Targeted ultrasound imaging was performed in the 4-5 o'clock left breast. The tissue is dense and heterogeneous. No discrete solid or cystic mass was identified. No definite sonographic correlate for the MRI finding in the inferior outer left breast.  Given that the MRI was performed to assess extent of disease, MR guided biopsy will be necessary. This was discussed with the patient at the time of imaging. The patient met with [facility] patient navigator, [facility], following today's appointment. [facility] patient navigator, [place], will be contacting the patient to schedule the MR guided biopsy. BIRADS - CATEGORY 1 Negative ultrasound. MR guided biopsy is recommended. OVERALL ASSESSMENT - NEGATIVE A letter of notification will be sent to the patient regarding the results. BIRADS: BI-RADS 1     MAMMOGRAM POST BX CLIP PLACEMENT LEFT    Addendum Date: 7/6/2021    ADDENDUM: Radiology/pathology correlation: Pathology results from an MR guided biopsy performed on a small focus of enhancement in the 4-5 o'clock left breast demonstrates organized fat necrosis (fibrosis). Negative for atypia and malignancy. The result is benign and concordant. However, patient has a known malignancy in the 12 o'clock left breast and appropriate management is recommended.      Result Date: 7/6/2021  EXAMINATION: MRI NEEDLE BREAST BIOPSY LEFT 7/1/2021 1:25 pm PROCEDURE: MRI NEEDLE BREAST BX LT 7/1/2021 HISTORY: ORDERING SYSTEM images demonstrate appropriate position of the biopsy clip and biopsy cavity. The introducer sheath and obturator were removed, and pressure was held to achieve hemostasis. The skin nick was dressed with Steri-Strips. The patient tolerated the procedure well without complication. Post procedure mammogram was obtained to document placement of clip. FINDINGS: Post biopsy CC and true lateral views of the left breast were obtained in a separate room. The biopsy clip is in the expected location for the targeted lesion. No evidence of biopsy clip migration. Technically successful MR guided biopsy of a 0.5 cm focus of enhancement in the lower outer left breast and M shaped clip marker placement, as above. Please await radiology/pathology concordance addendum. Narrative   EXAMINATION:   SCREENING DIGITAL BILATERAL  MAMMOGRAM WITH TOMOSYNTHESIS, 5/13/2021       TECHNIQUE:   Screening mammography was performed with tomosynthesis including MLO and CC   views of the bilateral breasts. Computer aided detection was used for the   interpretation of this exam.       COMPARISON:   Mammographic imaging with the most recent prior dated April 24, 2019.       HISTORY:   Screening.       Mother with breast cancer at age 72.       Based on the Tyrer-Cuzick (CARLA) model, this patient's lifetime risk for   developing breast cancer is 24.4%.       The American Cancer Society considers women with a 20% or greater lifetime   risk for developing breast cancer as \"high risk\" .  Women at risk for breast   cancer may benefit from additional screening, which may include an annual   screening mammogram alternating every six (6) months with a breast MRI, as   appropriate.       Recommend that this patient consult with her preferred provider about: A   genetic counseling consultation to discuss formal risk assessment, and a   medical oncology consultation to discuss risk reduction strategies (if not   already performed).  Assistance, if requested, is available through the [facility]   high risk breast program at [phone], or by placing an [facility] ambulatory   referral to the high risk breast clinic\" .       FINDINGS:   Parenchyma is heterogeneously dense which could obscure an underlying lesion.       Unremarkable appearance of the right breast.       In the upper-outer quadrant of the left breast is an asymmetric density with   suggestion of architectural distortion.  Diagnostic coned-down compression   imaging is necessary for further evaluation, to be followed by targeted left   breast ultrasound as is appropriate.           Impression   Suspicious area of asymmetric density upper-outer quadrant left breast for   which further evaluation is recommended.       BIRADS - CATEGORY 0       Incomplete: Needs Additional Imaging Evaluation       OVERALL ASSESSMENT - INCOMPLETE:NEED ADDITIONAL IMAGING EVALUATION.                Diagnosis:      1. Left breast carcinoma    Plans:     1.  Left breast lumpectomy, sentinel lymph node biopsy with frozen section and possible node dissection & left breast oncoplastic reconstruction with gerson incisional wound vac and pectoral block      RIKKI Morgan CNP  7/20/2021  7:24 AM

## 2021-07-20 NOTE — ANESTHESIA POSTPROCEDURE EVALUATION
Department of Anesthesiology  Postprocedure Note    Patient: Nithya Khalil  MRN: [id number]  YOB: 1970  Date of evaluation: 7/20/2021  Time:  2:31 PM     Procedure Summary     Date: 07/20/21 Room / Location: [address] [place] [address] - INPATIENT    Anesthesia Start: 1286 Anesthesia Stop: 0226    Procedures:       LEFT BREAST LUMPECTOMY    SENTINEL LYMPH NODE BIOPSY  (@ 07:30 )  WITH FROZEN SECTION (Left Breast)      LEFT BREAST ONCOPLASTIC RECONSTRUCTION   PECT BLOCK (Left Breast) Diagnosis: (DX LEFT BREAST CARCINOMA)    Surgeons: Anisa Lopez DO; Jesi Aquino MD Responsible Provider: Sarai Rothman MD    Anesthesia Type: general ASA Status: 2          Anesthesia Type: general    Mya Phase I: Mya Score: 10    Mya Phase II:      Last vitals: Reviewed and per EMR flowsheets.        Anesthesia Post Evaluation    Complications: no

## 2021-07-20 NOTE — BRIEF OP NOTE
Brief Postoperative Note  Janey Gallagher  [id number]  1970    Pre-operative Diagnosis: lobular carcinoma of left breast Clinical Stage I [id number]    Post-operative Diagnosis: Same    Procedure: Left breast lumpectomy with sentinel lymph node biopsy and frozen section    Anesthesia: General    Surgeon: Dr. Alfonso Zhu     Assistant: Dr. Alok Zapata, PGY1    Estimated Blood Loss: 50 cc    Complications: None    Specimens: sentinel lymph node     Findings: negative for abnormal pathology    Wound Classification:  Type 1 wound classification    Electronically signed by Alok Zapata DO  on 7/20/2021 at 1:05 PM

## 2021-07-20 NOTE — ANESTHESIA PRE PROCEDURE
Department of Anesthesiology  Preprocedure Note       Name:  Nithya Khalil   Age:  45 y.o.  :  1970                                          MRN:  [id number]         Date:  2021      Surgeon: Phuong Islas):  Tapan Brownlee MD    Procedure: Procedure(s):  LEFT BREAST LUMPECTOMY    SENTINEL LYMPH NODE BIOPSY  (@ 07:30 )  WITH FROZEN SECTION AND POSSIBLE NODE DISSECTION  LEFT BREAST ONCOPLASTIC RECONSTRUCTION WITH OSVALDO INCISIONAL WOUND VAC   PECT BLOCK    Medications prior to admission:   Prior to Admission medications    Not on File       Current medications:    Current Facility-Administered Medications   Medication Dose Route Frequency Provider Last Rate Last Admin    lactated ringers infusion   Intravenous Continuous Sarai Rothman MD        lidocaine 1% (buffered) injection 0.5 mL  0.5 mL Subcutaneous PRN Sarai Rothman MD           Allergies:  No Known Allergies    Problem List:    Patient Active Problem List   Diagnosis Code    Body mass index (BMI) 20.0-20.9, adult Z68.20    Refused influenza vaccine Z28.21    Malignant neoplasm of upper-outer quadrant of left breast in female, estrogen receptor [facility] C50.412, Z17.0       Past Medical History:        Diagnosis Date    Breast cancer [facility])        Past Surgical History:        Procedure Laterality Date    MRI BREAST BX USING DEVICE LEFT Left 2021    MRI BREAST BX USING DEVICE LEFT 2021 [facility]    US BREAST NEEDLE BIOPSY LEFT Left 6/3/2021    US BREAST NEEDLE BIOPSY LEFT 6/3/2021 [facility] LYMPH NODE BIOPSY  6/3/2021    US LYMPH NODE BIOPSY 6/3/2021 [facility]    WISDOM TOOTH EXTRACTION         Social History:    Social History     Tobacco Use    Smoking status: Never Smoker    Smokeless tobacco: Never Used   Substance Use Topics    Alcohol use:  No                                Counseling given: Not Answered      Vital Signs (Current):   Vitals:    21 0635 21 0641   BP: 114/70   Pulse:  79   Resp:  18   Temp:  97.5 °F (36.4 °C)   TempSrc:  Temporal   SpO2:  99%   Weight: 143 lb 1 oz (64.9 kg)    Height: 5' 8\" (1.727 m)                                               BP Readings from Last 3 Encounters:   07/20/21 114/70   06/18/21 113/68   06/16/21 108/72       NPO Status: Time of last liquid consumption: 2000                        Time of last solid consumption: 1830                        Date of last liquid consumption: 07/19/21                        Date of last solid food consumption: 07/19/21    BMI:   Wt Readings from Last 3 Encounters:   07/20/21 143 lb 1 oz (64.9 kg)   06/18/21 139 lb 14.4 oz (63.5 kg)   06/16/21 142 lb (64.4 kg)     Body mass index is 21.75 kg/m². CBC:   Lab Results   Component Value Date    WBC 7.4 06/18/2021    RBC 4.78 06/18/2021    HGB 15.1 06/18/2021    HCT 44.4 06/18/2021    MCV 92.8 06/18/2021    RDW 12.5 06/18/2021     06/18/2021       CMP:   Lab Results   Component Value Date     06/18/2021    K 4.4 06/18/2021     06/18/2021    CO2 28 06/18/2021    BUN 14 06/18/2021    CREATININE 0.79 06/18/2021    GFRAA >60 06/18/2021    LABGLOM >60 06/18/2021    GLUCOSE 85 06/18/2021    PROT 7.7 06/18/2021    CALCIUM 9.7 06/18/2021    BILITOT 0.51 06/18/2021    ALKPHOS 147 06/18/2021    AST 20 06/18/2021    ALT 14 06/18/2021       POC Tests: No results for input(s): POCGLU, POCNA, POCK, POCCL, POCBUN, POCHEMO, POCHCT in the last 72 hours.     Coags: No results found for: PROTIME, INR, APTT    HCG (If Applicable): No results found for: PREGTESTUR, PREGSERUM, HCG, HCGQUANT     ABGs: No results found for: PHART, PO2ART, FAB0SSY, QOY1IEK, BEART, [id number]     Type & Screen (If Applicable):  No results found for: LABABO, LABRH    Drug/Infectious Status (If Applicable):  No results found for: HIV, HEPCAB    COVID-19 Screening (If Applicable): No results found for: COVID19        Anesthesia Evaluation    Airway: Mallampati: I  TM distance: >3 FB Neck ROM: full  Mouth opening: > = 3 FB Dental:          Pulmonary:Negative Pulmonary ROS                              Cardiovascular:Negative CV ROS                      Neuro/Psych:               GI/Hepatic/Renal:             Endo/Other:                     Abdominal:             Vascular: Other Findings:             Anesthesia Plan      general     ASA 2             Anesthetic plan and risks discussed with patient.                       Claudia Lew MD   7/20/2021 no

## 2021-07-20 NOTE — INTERVAL H&P NOTE
Update History & Physical    The patient's History and Physical of July 20, 2021 was reviewed with the patient and I examined the patient. There was no changes the surgical site was confirmed by the patient and me. Plan: The risks, benefits, expected outcome, and alternative to the recommended procedure have been discussed with the patient. Patient understands and wants to proceed with the procedure.      Electronically signed by Gama Jaime MD on 7/20/2021 at 8:50 AM

## 2021-07-20 NOTE — ADDENDUM NOTE
Addendum  created 07/20/21 1619 by John Petersen MD    Clinical Note Signed, Diagnosis association updated, Intraprocedure Blocks edited

## 2021-07-20 NOTE — H&P
[address] [place] [address] SURGICAL SPECIALISTS  [facility] [id number]       History and Physical     No chief complaint on file. HPI:   Jose Miguel Crum is a 45 y.o. female who presents with biopsy-proven breast cancer on the left side. The abnormality is located in the upper outer quadrant at about 1230 to 1 o'clock position. The mass is approximately 1.7 x 1.3 cm. Patient is planning to have a lumpectomy. Patient is here to discuss her reconstructive options. .  Medications:     Current Facility-Administered Medications   Medication Dose Route Frequency Provider Last Rate Last Admin    lactated ringers infusion   Intravenous Continuous Ren Carmona  mL/hr at 07/20/21 0707 New Bag at 07/20/21 0707    lidocaine 1% (buffered) injection 0.5 mL  0.5 mL Subcutaneous PRN Ren Carmona MD        ceFAZolin (ANCEF) 2000 mg in dextrose 5 % 50 mL IVPB  2,000 mg Intravenous Once Patience Scott DO        fentaNYL (SUBLIMAZE) injection 25 mcg  25 mcg Intravenous Q5 Min PRN Ren Carmona MD        fentaNYL (SUBLIMAZE) injection 50 mcg  50 mcg Intravenous Q5 Min PRN Ren Carmona MD        HYDROcodone-acetaminophen (NORCO) 5-325 MG per tablet 1 tablet  1 tablet Oral PRN Ren Carmona MD        Or   Illa Solum HYDROcodone-acetaminophen (NORCO) 5-325 MG per tablet 2 tablet  2 tablet Oral PRN Ren Carmona MD        ondansetron [facility]) injection 4 mg  4 mg Intravenous Once PRN Ren Carmona MD        bupivacaine liposome (EXPAREL) 1.3 % injection              Facility-Administered Medications Ordered in Other Encounters   Medication Dose Route Frequency Provider Last Rate Last Admin    technetium Tc 99m tilmanocept (LYMPHOSEEK) injection 0.6 millicurie  036 micro curie Subcutaneous ONCE PRN Deepa Jose DO   0.6 millicurie at 89/32/24 9219      Allergies:   No Known Allergies  Review of Systems:   Constitutional: Negative for fever, chills, fatigue and unexpected weight change. HENT: Negative for hearing loss, sore throat and facial swelling. Eyes: Negative for pain and discharge. Respiratory: Negative for cough and shortness of breath. Cardiovascular: Negative for chest pain. Gastrointestinal: Negative for nausea, vomiting, diarrhea and constipation. Skin: Negative for pallor and rash. Neurological: Negative for seizures, syncope and numbness. Hematological: Does not bruise/bleed easily. Psychiatric/Behavioral: Negative for behavioral problems. The patient is not nervous/anxious. Past Medical History:   Diagnosis Date    Breast cancer [facility])      Past Surgical History:   Procedure Laterality Date    MRI BREAST BX USING DEVICE LEFT Left 7/1/2021    MRI BREAST BX USING DEVICE LEFT 7/1/2021 [facility]    US BREAST NEEDLE BIOPSY LEFT Left 6/3/2021    US BREAST NEEDLE BIOPSY LEFT 6/3/2021 [address] [place] [address] [facility]    US LYMPH NODE BIOPSY  6/3/2021    US LYMPH NODE BIOPSY 6/3/2021 [facility] [address]    WISDOM TOOTH EXTRACTION       Social History     Socioeconomic History    Marital status:      Spouse name: Not on file    Number of children: Not on file    Years of education: Not on file    Highest education level: Not on file   Occupational History    Not on file   Tobacco Use    Smoking status: Never Smoker    Smokeless tobacco: Never Used   Substance and Sexual Activity    Alcohol use: No    Drug use: No    Sexual activity: Yes     Partners: Male   Other Topics Concern    Not on file   Social History Narrative    Not on file     Social Determinants of Health     Financial Resource Strain:     Difficulty of Paying Living Expenses:    Food Insecurity:     Worried About Running Out of Food in the Last Year:     Ran Out of Food in the Last Year:    Transportation Needs:     Lack of Transportation (Medical):      Lack of Transportation (Non-Medical):    Physical Activity:     Days of Exercise per Week:     Minutes of Exercise per Session:    Stress: Cancer? [x ] Yes  -mother     [ ] No     Breast Masses:       [ ] Right     [x ] Left     Year: 2021  Breast Biopsies:                 [ ] Right     [x ] Left     Year: 2021  Previous Breast Surgeries:      [ ] Yes       [x ] No       Year:             Type:   Mastectomy:        [ ] Right     [ ] Left     Year:  Lumpectomy:        [ ] Right     [ ] Left     Year:  Radiation Treatment:       [ ] Yes       [x ] No       Year:               Where? Doctor? Miscellaneous:       [ ] Fibrocystic changes            [ ] Systemic Disease   [ ] Mastalgia                              [ ] Diabetes  Bleeding Problems:        [ ] Yes       [ x] No        Type: Allergies:    Allergies as of 06/16/2021    (No Known Allergies)     Other:     PHYSICAL EXAM  Chest Wall        [ ] Pectus Deform      [ ] Scoliosis      [ ] Scars      [ ] Venous Stasis  Asymmetries:         [ ] Right      [ ] Left      [ ] Bilateral  Re-Construction       [ ] Right      [ ] Left      [ ] Bilateral  Tubular:Discussed Options:       [ ] Right      [ ] Left      [ ] Bilateral  Striae:          [ ] Right      [ ] Left      [ ] Bilateral  Other:     NEUROLOGICAL EXAM  Normal:        [ ] Yes       [ ] No           Problem/Explain:   MEASUREMENTS     Chest Circumference in Inches   Above the breast: 35.25 Chest Circumference in Inches   Bellow the breast: 31   Breast Girth (inches)          35.75                                               Sternal Notch to Nipple RIGHT (cm)  23 Sternal Notch to Nipple LEFT (cm)22.5   Nipple to IMF RIGHT (cm) 7.1   Nipple to IMF LEFT (cm) 5.9   Breast Width RIGHT (cm)    14.9                                           Breast Width LEFT (cm)   14.0                                          Current bra size:  34B Desired bra size   unchanged         Resection Grams for each Size  32-34 = 100g   36-38 = 200g     42/44 = 300g     44-46 = 400g   BSA GRAMS OF TISSUE TO BE REMOVED PER BREAST    1.40-1.50 218-260 1. 51-1.60 261-310   1.61-1.70 311-370   1.71-1.80 012-804   1.81-1.90 443-840   1.91-2.00 528-628   2.01-2.10 629-750   2.11-2.20 054-509   2.21-2.30 055-1333   2.31-2.40 1955-9715   2.41-2.50 9734-3529   2.51-2.60 3721-6446   2.61-2.70 5027-8747   2.7-2.80 4221-3792   2.81-2.90 2303-8723   2.91-3.00 7730-4880       Reconstruction options discussed: We discussed oncoplastic reconstruction of the left breast.  The left breast is smaller than the right breast    I discussed with the patient that the patient is responsible for obtaining a mammogram prior to any surgical intervention if they have not had a mammogram within 1 year of the scheduled procedure. Refer to:     Imaging:                               Impression/Plan:      Diagnosis Orders   1. Breast carcinoma, female, left [facility])       Patient Active Problem List   Diagnosis    Body mass index (BMI) 20.0-20.9, adult    Refused influenza vaccine    Malignant neoplasm of upper-outer quadrant of left breast in female, estrogen receptor positive ([facility] [address]       Plan:  Patient with biopsy-proven breast cancer lobular carcinoma located between the 1230 to 1 o'clock position of the left breast.  We discussed oncoplastic reconstruction. The risks were discussed with the patient in detail. All questions were answered. The following information was discussed with the patient, but this is not an all-inclusive-other issue were also discussed with patient. The specific risks of reduction mammoplasty surgery were explained to the patient. These include but not limited to changes in nipple and skin sensation and which may be experienced as but limited not limited to diminished or loss of sensitivity of nipple and the skin of the breast.  Partial or permanent loss of nipple sensation can occur in one or both nipples. Nipple sensation may be lost if nipple graft technique, so I used. Changes in skin and nipple sensation may change/affect your sexual response. or the ability to breast feed. In some circumstances the nipple may be lost entirely. In some circumstances you may have hypersensitivity which is excess sensitivity of the nipple/areolar skin. Skin contour irregularities may occur after breast reduction surgery. Visible and palpable wrinkling may occur. There may be dimpling. One breast may be smaller than the other. Nipple position and shape will not be identical from one side to the next. Residual skin irregularities at the end of the incisions \" dogears\" are always a possibility when there is resection of excessive skin and redundant tissue. This may improve in time or may need to be surgically corrected. There are some breast asymmetry, that has been pointed out to you in this consultation. Differences in terms of breast and nipple shape, size, or symmetry and may also occur after surgery. Additional surgery may be necessary to revise asymmetry after breast reduction surgery. Unsatisfactory results may also occur there is no guarantee or warrantees expressed or implied as to what type of results that may be obtained. Also, no guarantee is expressed or implied on the size that is obtained. I explained to the patient that each bra company is different and a size in one bra company is not necessarily the same as another. I also explained to her that she may be disappointed with the results of that breast reduction surgery. Asymmetry and the nipple location, unanticipated breast shape and size, loss of function, wound disruption especially in the T-zone where the vertical and horizontal incision come together, poor healing, and loss of sensation or hypersensitivity may occur after her breast reduction surgery. Breast size and contour may not be as it was expected, or as the patient has anticipated. Unsatisfactory surgical scar, location or visible deformities may be presents.   There may be fat necrosis which may feel as hard lumps and spots in the breast.  Lipoplasty may be necessary to thin breast tissue that is outside of the normal surgical location for reduction mammoplasties. This is generally not covered by the insurance. It may be necessary to perform additional surgeries in order to improve the results. I discussed with the patient that we cannot guarantee as size. I discussed the breast asymmetry. I also discussed with the patient we cannot guarantee an upper symmetry. I also discussed with the patient about fat necrosis. Also discussed with the patient regarding the loss of nipple and areolar complex. Also discussed with the patient about changes in sensation in the nipple and areolar complex including hyper or hypersensitivity. Also discussed with the patient regarding wound healing issues. I also discussed with the patient regarding seromas bleeding infection. No guarantees were made as far as relief of pain and discomfort. General surgical risks: This is an elective surgery. You always have the option of no surgery. Seroma- Fluid may accumulate around the tissue expander/implant or surgical sites following surgery, from trauma or vigorous exercise. Additional treatment may be necessary to drain fluid accumulation around tissue expander or implant or surgical site. This may contribute to infection, capsular contracture, or other problems. Bleeding- It is possible, to experience a bleeding episode during or after surgery this can increase if you are on any blood thinners. Should post-operative bleeding occur, it may require emergency treatment to drain accumulated blood or blood transfusion. Intra-operative blood transfusion may also be required. Hematoma may contribute to capsular contracture, infection or other problems. Do not take any aspirin or anti-inflammatory medications for ten days before or after surgery, as this may increase the risk of bleeding.   Also discussed with the patient that if the patient is taking aspirin for medical reasons, we may need to continue this to prevent medical complications patient understands that this will lead to increased bleeding. Non-prescription herbs and dietary supplements can increase the risk of surgical bleeding. Hematoma can occur at any time following injury to the breast. If blood transfusions are necessary to treat blood loss, there is the risk of blood-related infections such as hepatitis and HIV (AIDS). Heparin or other blood thinner medications that are used to prevent blood clots in veins can produce bleeding and decreased blood platelets. List of medications that thin the blood was provided to the patient. Infection- Infections can occur after any type of surgery, it may appear in the immediate post-operative period or at any time following surgery. Subacute or chronic infections may be difficult to diagnose. Should an infection occur, treatment including antibiotics. You may need additional surgery. In extremely rare instances, life-threatening infections, including toxic shock syndrome have been noted after tissue expander/implant/nasal packing or any implantable device or packing. Individuals with an active infection in their body or weakened immune system (currently receiving or have received chemotherapy or drugs to suppress the immune system), may be at greater risk for infection. Surgical Anesthesia- Both local and general anesthesia involved risk. There is the possibility of complications, injury, and even death from all forms of surgical anesthesia or sedation. Scarring- All surgery leaves scars, some more visible than others. Excessive scarring can occur depending on the healing process. Although wound healing after a surgical procedure is expected, abnormal scars may occur within the skin and deeper tissues. Scars may be unattractive and of different color than the surrounding skin tone.   Scar appearance may also vary within the same scar.  Scars may be asymmetrical (appear different on the right and left side of the body. There is the possibility of visible marks in the skin from sutures. In some cases, scars may require surgical revision or treatment. Firmness:  Excessive firmness can occur after surgery due to the internal scarring. This can also be due to fat necrosis. This occurrence is not predictable. Additional treatment or surgery may be required to treat this. Allergic Reactions- In some cases, local allergies to tape, suture material and glues, blood products, topical preparations or injected agents have been reported. Serious systemic reactions including shock (anaphylaxis) may occur in response to drugs used during surgery and prescription medicines. Allergic reactions may require additional treatment  Thrombosed Veins- Thrombosed veins, which resemble cords, occasionally develop in the area of the surgery, and resolve without medical or surgical treatment. Unusual Activities and Occupations- Activities and occupations that have the potential for trauma to the area could potentially break or damage any device or cause bleeding/seroma. Pain- You will experience pain after your surgery. Pain of varying intensity and duration may occur and persist after surgery. Pain may be the result of multiple factors including but not limited to internal or external scarring, or sensory nerve entrapment or injury. Chronic pain may occur for various reasons such as from nerves becoming trapped in scar tissue or due to tissue stretching this may be temporary or permanent. Skin Discoloration / Swelling- Some bruising and swelling normally occurs after surgery. The skin in or near the surgical site can appear either lighter or darker than surrounding skin. Although sometimes swelling and skin discoloration may persist for long periods of time and, in some situations, may be permanent.     Sutures- Most surgical techniques use deep sutures. You may notice these sutures after your surgery. Sutures may spontaneously poke through the skin, become visible or produce irritation that requires suture removal.  At times these deep sutures can cause skin infections and he need to be removed. Damage to Deeper Structures- There is the potential for injury to deeper structures including nerves, blood vessels and muscles and lungs (pneumothorax) during any surgical procedure. The potential for this to occur varies according to the type of procedure being performed. Injury to deeper structures may be temporary or permanent. Delayed Healing- Wound disruption or delayed wound healing is possible. Some areas of the skin region may not heal normally and may take a long time to heal.  Areas of skin or tissue may die. This may require frequent dressing changes or further surgery to remove the non-healed tissue. Individuals who have decreased blood supply to any tissue from past surgery, or radiation therapy, medications, or other reasons may be at increased risk for wound healing and poor surgical outcome. Smokers or secondhand smoke places you at a greater risk of skin loss and wound healing complications, and possible cardiopulmonary complications including but not limited to postoperative pneumonia. Cardiac and Pulmonary Complications- Pulmonary complications may occur secondarily to both blood clots (pulmonary emboli), fat deposits (fat emboli) or partial collapse of the lungs after general anesthesia. Pulmonary emboli can be life threatening or fatal in some circumstances. Inactivity and other conditions may increase the incidence of blood clots traveling to the lungs causing a major blood clot that may result in death. It is important to discuss with your physician any past history of swelling in your legs or blood clots that may contribute to this condition.   Cardiac complications are a risk with any surgery and anesthesia, even in patients without symptoms. Should any of these complications occur, you may require hospitalization and additional treatment. If you experience after surgery shortness of breath, chest pain, or unusual heart beats, you should have this evaluated immediately  Shock- In rare circumstances, your surgical procedure can cause severe trauma, particularly when multiple or extensive procedures are performed. Although serious complications are infrequent, infections or excessive fluid loss can lead to severe illness and even death. If surgical shock occurs, hospitalization and additional treatment would be necessary. Radiation Therapy- Radiation therapy to the region before or after surgery can produce unacceptable firmness or other long-term complications. Unsatisfactory Result- There is no guarantee or warranty expressed or implied, on the results that may be obtained. You may be disappointed with the results of surgery. Asymmetry, loss of function, wound disruption, poor healing, and loss of sensation may occur after surgery. Unsatisfactory surgical scar location may occur. any type of surgical treatment may fail due to complications attributable to previous or from later chemotherapy/radiation therapy treatments, or other treatments. It may be necessary to perform additional surgery to improve your results. Obesity: If you're BMI is greater than then 30 you have higher chance of surgical complications including but not limited to wound healing problems etc.  Long-Term Results- Subsequent alterations in breast/body shape may occur as the result of aging, sun exposure, weight loss, weight gain, pregnancy, menopause, or other circumstances not related to your surgery. These factors may affect your surgical results. Mental Health Disorders and Elective Surgery- It is important that all patients seeking to undergo elective surgery have realistic expectations that focus on improvement rather than perfection.   Complications or less than satisfactory results are sometimes unavoidable, may require additional surgery and often are stressful. Please openly discuss with your surgeon, prior to surgery, any history that you may have of significant emotional depression or mental health disorders. Although many individuals may benefit psychologically from the results of elective surgery, effects on mental health cannot be accurately predicted. Medications- There are potential adverse reactions that occur as the result of taking over the counter, herbal, and/or prescription medications. Be sure to check with your physician about any drug interactions that may exist with medications that you are already taking. If you have an adverse reaction, stop the drugs immediately and call your plastic surgeon for further instructions. If the reaction is severe, go immediately to the nearest emergency room. When taking the prescribed pain medications after surgery, realize that they can affect your thought process and coordination. DO NOT drive, DO NOT  operate complex equipment, DO NOT make any important decisions, and DO NOT drink any alcohol while taking these medications. Be sure to take your prescribed medication only as directed. Smoking, Second-Hand Smoke Exposure, Nicotine Products (Patch, Gum, Nasal Spray):   Patients who are currently smoking, use tobacco products, or nicotine products (patch, gum, or nasal spray) are at a greater risk for significant surgical complications of skin dying and delayed healing. Individuals exposed to second-hand smoke are also at potential risk for similar complications attributable to nicotine exposure. Additionally, smokers may have a significant negative effect on anesthesia and recovery from anesthesia, with coughing and possibly increased bleeding. Individuals who are not exposed to tobacco smoke or nicotine-containing products have a significantly lower risk of this type of complication. Obesity: If you're BMI is greater than 30 you may have a higher chance of complications. This may include but not limited to wound healing and infections. Also, if you have other medical problems such as diabetes and hypertension it may effect your healing as well as your surgical results in bleeding. ADDITIONAL SURGERY NECESSARY  Many variable conditions may influence the long-term result surgery . It is unknown how your tissue may respond to surgery or how wound healing will occur after surgery. Secondary surgery may be necessary to improve your outcome. Should complications occur, additional surgery or other treatments may be necessary. Other complications and risks can occur but are even more uncommon. The practice of medicine and surgery is not an exact science. There is no guarantee or warranty expressed or implied, on the results that may be obtained. In some situations, it may not be possible to achieve optimal results with a single surgical procedure. PATIENT COMPLIANCE  Discussed the importance of patient's compliance and falling down postoperative instructions carefully; this is essential for the success of your outcome. It is important that the surgical incisions are not subjected to excessive force, swelling, abrasion, or motion during the time of healing. Personal and vocational activity must be restricted. Protective dressings and drains should not be removed unless instructed by me. Successful post-operative function depends on both surgery and subsequent care. Physical activity that increases your pulse or heart rate may cause bruising, swelling, fluid accumulation around implants/surgical site and the need for return to surgery. Physical activity that increases her pulse or heart rate may also cause some disruption of your wound and wound healing issue. It is wise to refrain from intimate physical activities after surgery until your physician states it is safe.   It is important that you participate in follow-up care, return for aftercare, and promote your recovery after surgery.         Electronically signed by:  Gama Jaime MD 7/20/2021

## 2021-07-20 NOTE — OP NOTE
Operative Note      Patient: Eryn Millan  YOB: 1970  MRN: [id number]    Date of Procedure: 7/20/2021    Pre-Op Diagnosis: DX LEFT BREAST CARCINOMA    Post-Op Diagnosis: Same       Procedure(s):  LEFT BREAST LUMPECTOMY    SENTINEL LYMPH NODE BIOPSY  (@ 07:30 )  WITH FROZEN SECTION  LEFT BREAST ONCOPLASTIC RECONSTRUCTION using pectoralis minor muscle flap. Surgeon(s):  MD Myesha Alvarez MD Doree Falcon,         Anesthesia: General    Estimated Blood Loss (mL): Minimal    Complications: None    Specimens:   ID Type Source Tests Collected by Time Destination   A : LEFT AXILLARY SENTINEL NODES Tissue Axillary SURGICAL PATHOLOGY Nubia Campbell, DO 7/20/2021 1031    B : ADDITIONAL ANTERIOR MARGIN- DOUBLE SUTURE BLACK IS NEW ANTERIOR MARGIN, LONG SINGLE BLACK IS INFERIOR MARGIN Tissue Breast SURGICAL PATHOLOGY Nubia Campbell, DO 7/20/2021 1102    C : DOUBLE SHORT BLACK SUTURE IS SUPERIOR MARGIN, SINGLE LONG BLACK SUTURE IS LATERAL MARGIN, DOUBLE LONG BLACK SUTURE IS MEDIAL MARGIN, BLUE SUTURE IS DEEP MARGIN Tissue Breast SURGICAL PATHOLOGY Nubia Campbell, DO 7/20/2021 1122    The specimen weighed approximately 45 g. Implants:  none      Drains:   Closed/Suction Drain Inferior; Lateral;Left Other (Comment) Bulb 10 [place] (Active)       [REMOVED] Urethral Catheter Non-latex 16 fr (Removed)           INDICATION FOR PROCEDURE:  The patient is 45 y.o. female . All the risks, benefits, and alternative treatments were explained to the patient and an informed consent was obtained. DESCRIPTION OF PROCEDURE:  The patient was marked in the holding area. The inframammary fold and area of possible habitus was marked. The patient was brought into the room, was placed on the table in the supine position. SCDs were placed and turned on. Anesthesia was induced via endotracheal intubation. A Baptiste catheter was placed.   Then all areas were prepped and draped in usual customary sterile manner. A timeout was performed. Everybody in the room was in agreement with the timeout. Dr. Brad Batista performed her part of the procedure. Then I came in. Gloves were changed. All new instruments were used. The cavity was evaluated. There was a defect that extended under the nipple and areolar complex the superior border as well as on the overlying pectoralis major muscle the fascia was removed extending superiorly. The defect size was approximately 7 cm x 6.5 cm. Clips were placed in the anterior margins of the 4 corners of the defect. Then using the same incision the pectoralis minor was elevated with the overlying breast tissue. Through the axillary incision that was made pectoralis muscle was incised. The pectoralis muscle along with the overlying breast tissue was continually dissected and elevated. Muscle flap along with the overlying breast tissue was rotated. It was noted that there was adequate tissue at this time. And the area was irrigated. Any bleeding points was in the foot was cauterized. Then using 2-0 Vicryl in an interrupted manner the tissue was inset. Then a #10 Larry drain was placed were muscle on the tissue was harvested through the axillary incision. Then using 3-0 Vicryl in an interrupted manner to deep tissue was closed. Then using 3-oh Quill in a subcuticular manner the skin was closed. Then the axilla was closed using 3-0 Vicryl in an interrupted manner. Then using 3-0 Prolene drain was sutured in place. Mastisol and Steri-Strips were placed over the skin incision over the breast.  Dermabond was placed in axilla. Then fluffs and Kerlix was placed. The surgical bra was placed. Patient tolerated procedure well. Patient was transferred to recovery room in stable condition.       Electronically signed by Gama Jaime MD on 7/20/2021 at 12:58 PM

## 2021-07-22 ENCOUNTER — TELEPHONE (OUTPATIENT)
Dept: ONCOLOGY | Age: 51
End: 2021-07-22

## 2021-07-22 NOTE — TELEPHONE ENCOUNTER
Called to check in with [facility] after her surgery. She is doing well, taking primarily ibuprofen. Encouraged her to rest and take it easy but to get up and walk and deep breathe at intervals. She has follow up scheduled with surgeon, med onc and rad onc. She will call to schedule with plastic surgeon. Questions answered and pt in good spirits. Encouraged to call. Pt on pending.

## 2021-07-23 NOTE — OP NOTE
[address] [facility] [address]                [address] [facility] [address], [address] [facility]                                OPERATIVE REPORT    PATIENT NAME: Morteza Lr                    :        1970  MED REC NO:   [id number]                             ROOM:  ACCOUNT NO:   [de-identified]                           ADMIT DATE: 2021  PROVIDER:     Price Beltran    DATE OF PROCEDURE:  2021    PREOPERATIVE DIAGNOSIS:  Clinical stage I lobular carcinoma of the left  upper breast.    POSTOPERATIVE DIAGNOSIS:  Clinical stage I lobular carcinoma of the left  upper breast.    PROCEDURE:  Left breast lumpectomy with sentinel lymph node biopsy and  frozen section. SURGEON:  Price Beltran DO    ASSISTANT:  Lynne Rome DO, PGY1    CLINICAL INDICATIONS:  As above. OPERATIVE PROCEDURE:  The patient is placed in the supine position and  receives general anesthesia. The left breast, chest wall, and upper arm  are sterilely prepped and draped. The patient demonstrates a palpable  mass in the supra-areolar left breast which is approximately 1.5 cm x 2  cm on surface palpation. A curvilinear incision is made in the axilla  in anticipation of sentinel lymph node biopsy. The probe is applied and  this allows location of the incision. We dissected down using the probe  recurrently until 2 to 3 lymph nodes are removed and remaining counts  are well below 10% of the max count. These nodes are marked and sent to  the pathologist for frozen section. The area is inspected for good  hemostasis which is noted and a moist gauze is applied to the area. The  mass is then infiltrated on the skin level and a curvilinear incision is  made across the upper area just above the areola. This is carried into  the superficial subcutaneous tissue and then an elliptical excision of  the mass using cautery and sharp dissection is performed.   Bleeding  vessels are controlled with electrocautery and ligatures of 3-0 Vicryl. The mass is appropriately oriented and removed. An additional anterior  margin is resected and appropriately marked and specimens are sent to  the pathologist.  The area is inspected for hemostasis and further  cautery is applied. Subsequent to this, Dr. Allegra Luciano enters to perform  oncoplastic reconstruction. All instrument, sponge, and needle counts  are stable at the end of the excision procedure and the patient  tolerates the procedure well. Blood loss during this portion of the  procedure is approximately 50 mL.         Lizette Worley    D: 07/23/2021 10:13:54       T: 07/23/2021 10:41:37     [email or web address]  Job#: [id number]     Doc#: [id number]    CC:

## 2021-07-26 PROBLEM — Z28.21 REFUSED INFLUENZA VACCINE: Status: RESOLVED | Noted: 2017-08-28 | Resolved: 2021-07-26

## 2021-07-26 LAB — SURGICAL PATHOLOGY REPORT: NORMAL

## 2021-07-28 ENCOUNTER — OFFICE VISIT (OUTPATIENT)
Dept: SURGERY | Age: 51
End: 2021-07-28

## 2021-07-28 VITALS
WEIGHT: 143 LBS | OXYGEN SATURATION: 98 % | RESPIRATION RATE: 12 BRPM | HEIGHT: 68 IN | BODY MASS INDEX: 21.67 KG/M2 | HEART RATE: 65 BPM

## 2021-07-28 DIAGNOSIS — Z09 POSTOP CHECK: Primary | ICD-10-CM

## 2021-07-28 PROCEDURE — 99024 POSTOP FOLLOW-UP VISIT: CPT | Performed by: PLASTIC SURGERY

## 2021-07-28 NOTE — PROGRESS NOTES
[facility]  [address] [place] [address],[address]  [address] [facility] [address]       OFFICE POST-OP NOTE    Patient Name:  River Jeter    :  1970    MRN:  [id number]  STATUS POST  Chief Complaint   Patient presents with    New Patient     Left breast lumpectomy DOS- 21     SUBJECTIVE  Patient seen and examined. Patient status post lumpectomy on 2021. Patient had an oncoplastic reconstruction on the same day. Patient is here for follow-up. PHYSICAL EXAM  Vital Signs:  Pulse 65   Resp 12   Ht 5' 8\" (1.727 m)   Wt 143 lb (64.9 kg)   SpO2 98%   BMI 21.74 kg/m²     Incisions:  Suture line clean dry and intact. Patient's drain is putting out 5 cc to 10 cc/day. Skin: Patient has a rash as a reaction to Mastisol and Steri-Strips. .   Neurologic:  Alert & oriented x 3. ASSESSMENT   Diagnosis Orders   1. Postop check         PLAN  1. We will remove the drain  2. We will place antibiotic ointment and gauze over her rash. 3.  Patient is to follow-up in 1 week. Plan discussed with patient.     Electronically signed by:  Joanna Franco M.D.   2021

## 2021-08-02 ENCOUNTER — TELEPHONE (OUTPATIENT)
Dept: SURGERY | Age: 51
End: 2021-08-02

## 2021-08-02 DIAGNOSIS — R21 RASH: Primary | ICD-10-CM

## 2021-08-02 RX ORDER — CLOBETASOL PROPIONATE 0.5 MG/G
OINTMENT TOPICAL
Qty: 45 G | Refills: 0 | Status: SHIPPED | OUTPATIENT
Start: 2021-08-02 | End: 2021-10-21

## 2021-08-02 NOTE — TELEPHONE ENCOUNTER
Patient is requesting a topical steroid for her rash. Left breast lumpectomy DOS- 7/20/21  Patient has appointment on 8/4/21.   What do you suggest?

## 2021-08-04 ENCOUNTER — OFFICE VISIT (OUTPATIENT)
Dept: SURGERY | Age: 51
End: 2021-08-04

## 2021-08-04 VITALS
OXYGEN SATURATION: 100 % | RESPIRATION RATE: 12 BRPM | BODY MASS INDEX: 21.67 KG/M2 | HEART RATE: 66 BPM | HEIGHT: 68 IN | WEIGHT: 143 LBS

## 2021-08-04 DIAGNOSIS — Z09 POSTOPERATIVE EXAMINATION: Primary | ICD-10-CM

## 2021-08-04 PROCEDURE — 99024 POSTOP FOLLOW-UP VISIT: CPT | Performed by: PLASTIC SURGERY

## 2021-08-04 NOTE — LETTER
[facility]  [address] [facility] [address]  Phone: [phone]  Fax: [phone]    Myesha Parnell MD        August 4, 2021     Patient: Eryn Millan   YOB: 1970   Date of Visit: 8/4/2021       To Whom It May Concern: It is my medical opinion that Mary Khan may return to work on 8/12/21. If you have any questions or concerns, please don't hesitate to call.     Sincerely,        Myesha Parnell MD

## 2021-08-04 NOTE — PROGRESS NOTES
[address] [facility]  [address] [place] [address],[address]  [address] [facility] [address]       OFFICE POST-OP NOTE    Patient Name:  Jesica Clark    :  1970    MRN:  [id number]  STATUS POST  Chief Complaint   Patient presents with    Post-Op Check     left breast lumpectomy DOS 21      SUBJECTIVE  Patient seen and examined. Patient status post lumpectomy on 2021. Patient had an oncoplastic reconstruction on the same day. Patient is here for follow-up. PHYSICAL EXAM  Vital Signs:  Pulse 66   Resp 12   Ht 5' 8.4\" (1.737 m)   Wt 143 lb (64.9 kg)   SpO2 100%   BMI 21.49 kg/m²     Incisions:  Suture line clean dry and intact. Skin: The rash has improved significantly. .   Breast: Good symmetry. Neurologic:  Alert & oriented x 3. ASSESSMENT   Diagnosis Orders   1. Postoperative examination         PLAN  Patient is to continue her ointment. Patient is to follow-up in 2 weeks  Patient may return to work. Plan discussed with patient.     Electronically signed by:  Chris Ignacio M.D.   2021

## 2021-08-05 ENCOUNTER — TELEPHONE (OUTPATIENT)
Dept: ONCOLOGY | Age: 51
End: 2021-08-05

## 2021-08-05 NOTE — TELEPHONE ENCOUNTER
Oncotype PA form completed and faxed to [address] [place] [address] at [phone] with confirmation.

## 2021-08-09 ENCOUNTER — TELEPHONE (OUTPATIENT)
Dept: ONCOLOGY | Age: 51
End: 2021-08-09

## 2021-08-09 NOTE — TELEPHONE ENCOUNTER
Answered questions regarding oncotype and upcoming appointment. I let her know that we will have until tomorrow afternoon to get oncotype dx test results back. If they do not come in by then we will have to push her follow up appointments out. I let her know that I and my assistant will be following along with the results. One of us will let her know about the appointments for Wednesday.

## 2021-08-09 NOTE — TELEPHONE ENCOUNTER
Oncotype is in PA status. Mary, Triage RN at [facility], sent PA requested info back to Paul 8/5/21.

## 2021-08-10 NOTE — TELEPHONE ENCOUNTER
Since oncotype will not be back in time for 8/11/21 apptTamri, rescheduled patient for 8/18/21 with RO at 8:15 with Dr. Lizette Romero at [facility].  I left detailed message for Sonya Grimm. I left dimple and Alondra's numbers as call backs.

## 2021-08-10 NOTE — TELEPHONE ENCOUNTER
I called [employer] to check on status of PA and results. I spoke with ANICETO LOVE [facility] and she states that billing is the hold up. She then transferred me to to Yelitza Haji in billing. Fanny states that they did receive the medical nesscessity form back however two boxes were not marked that is needed; chemotherapy is an intial therapeutic option being considered by the individual and the provider & no other breast cancer gene expression profiling assay has been conducted for the same tumor (for example a metastatic focus) or from more than one site when primary tumor is multifocal.  Per Fanny the response can be called into [employer] at [phone] as they will take a verbal response and rico on the request.  Original request printed and taken to triage to address. I will call patient to move appts out as the results will not be back for tomorrow's appt.

## 2021-08-11 ENCOUNTER — APPOINTMENT (OUTPATIENT)
Dept: RADIATION ONCOLOGY | Age: 51
End: 2021-08-11
Payer: COMMERCIAL

## 2021-08-13 NOTE — TELEPHONE ENCOUNTER
Spoke with [employer] [phone]  Additional information was needed regarding PA for Oncotype DX  Writer provided this information and they will be able to submit the PA

## 2021-08-17 ENCOUNTER — TELEPHONE (OUTPATIENT)
Dept: ONCOLOGY | Age: 51
End: 2021-08-17

## 2021-08-17 NOTE — TELEPHONE ENCOUNTER
I called and spoke with patient and explained that [facility] is still waiting on her insurance. I spoke with Luly Schirmer  and we rescheduled patient for 8/25/21 MO at noon and RO at 1. Patient states that if she can not make the appointments she will call but does not see any issues. She thanked me for helping.

## 2021-08-17 NOTE — TELEPHONE ENCOUNTER
I called and spoke with Rogelio at [facility]. Prior Xiomara Saleh is still pending with patient's insurance. Results will not be released in time for patient's appointment on 8/18/21. Need to reschedule.

## 2021-08-18 ENCOUNTER — TELEPHONE (OUTPATIENT)
Dept: ONCOLOGY | Age: 51
End: 2021-08-18

## 2021-08-18 ENCOUNTER — OFFICE VISIT (OUTPATIENT)
Dept: ONCOLOGY | Age: 51
End: 2021-08-18
Payer: COMMERCIAL

## 2021-08-18 ENCOUNTER — HOSPITAL ENCOUNTER (OUTPATIENT)
Dept: RADIATION ONCOLOGY | Age: 51
Discharge: HOME | End: 2021-08-18
Payer: COMMERCIAL

## 2021-08-18 VITALS
OXYGEN SATURATION: 98 % | SYSTOLIC BLOOD PRESSURE: 112 MMHG | RESPIRATION RATE: 14 BRPM | HEART RATE: 75 BPM | WEIGHT: 144.4 LBS | DIASTOLIC BLOOD PRESSURE: 78 MMHG | TEMPERATURE: 99.1 F | BODY MASS INDEX: 21.7 KG/M2

## 2021-08-18 VITALS
HEART RATE: 78 BPM | TEMPERATURE: 99.1 F | SYSTOLIC BLOOD PRESSURE: 112 MMHG | BODY MASS INDEX: 21.64 KG/M2 | WEIGHT: 144 LBS | DIASTOLIC BLOOD PRESSURE: 78 MMHG

## 2021-08-18 DIAGNOSIS — Z17.0 MALIGNANT NEOPLASM OF UPPER-OUTER QUADRANT OF LEFT BREAST IN FEMALE, ESTROGEN RECEPTOR POSITIVE (HCC): ICD-10-CM

## 2021-08-18 DIAGNOSIS — C50.412 MALIGNANT NEOPLASM OF UPPER-OUTER QUADRANT OF LEFT BREAST IN FEMALE, ESTROGEN RECEPTOR POSITIVE (HCC): ICD-10-CM

## 2021-08-18 DIAGNOSIS — C50.412 MALIGNANT NEOPLASM OF UPPER-OUTER QUADRANT OF LEFT BREAST IN FEMALE, ESTROGEN RECEPTOR POSITIVE (HCC): Primary | ICD-10-CM

## 2021-08-18 DIAGNOSIS — Z17.0 MALIGNANT NEOPLASM OF UPPER-OUTER QUADRANT OF LEFT BREAST IN FEMALE, ESTROGEN RECEPTOR POSITIVE (HCC): Primary | ICD-10-CM

## 2021-08-18 PROCEDURE — 99215 OFFICE O/P EST HI 40 MIN: CPT | Performed by: INTERNAL MEDICINE

## 2021-08-18 PROCEDURE — 99213 OFFICE O/P EST LOW 20 MIN: CPT | Performed by: RADIOLOGY

## 2021-08-18 PROCEDURE — 99212 OFFICE O/P EST SF 10 MIN: CPT | Performed by: RADIOLOGY

## 2021-08-18 PROCEDURE — 99211 OFF/OP EST MAY X REQ PHY/QHP: CPT | Performed by: INTERNAL MEDICINE

## 2021-08-18 RX ORDER — M-VIT,TX,IRON,MINS/CALC/FOLIC 27MG-0.4MG
1 TABLET ORAL DAILY
COMMUNITY

## 2021-08-18 NOTE — PROGRESS NOTES
River eJter  8/18/2021  2:10 PM      Vitals:    08/18/21 1400   BP: 112/78   Pulse: 75   Resp: 14   Temp: 99.1 °F (37.3 °C)   SpO2: 98%    :  Patient Currently in Pain: Denies             Wt Readings from Last 1 Encounters:   08/18/21 144 lb 6.4 oz (65.5 kg)                Current Outpatient Medications:     Multiple Vitamins-Minerals (THERAPEUTIC MULTIVITAMIN-MINERALS) tablet, Take 1 tablet by mouth daily, Disp: , Rfl:     clobetasol (TEMOVATE) 0.05 % ointment, Apply topically 2 times daily. , Disp: 45 g, Rfl: 0        FALLS RISK SCREEN  Instructions:  Assess the patient and enter the appropriate indicators that are present for fall risk identification. Total the numbers entered and assign a fall risk score from Table 2.  Reassess patient at a minimum every 12 weeks or with status change. Assessment   Date  8/18/2021     1. Mental Ability: confusion/cognitively impaired 0     2. Elimination Issues: incontinence, frequency 0       3. Ambulatory: use of assistive devices (walker, cane, off-loading devices),        attached to equipment (IV pole, oxygen) 0     4. Sensory Limitations: dizziness, vertigo, impaired vision 0     5. Age less than 65        0     6. Age 72 or greater 0     7. Medication: diuretics, strong analgesics, hypnotics, sedatives,        antihypertensive agents 0   8. Falls:  recent history of falls within the last 3 months (not to include slipping or        tripping) 0   TOTAL 0    If score of 4 or greater was education given? No           TABLE 2   Risk Score Risk Level Plan of Care   0-3 Little or  No Risk 1. Provide assistance as indicated for ambulation activities  2. Reorient confused/cognitively impaired patient  3. Chair/bed in low position, stretcher/bed with siderails up except when performing patient care activities  5.   Educate patient/family/caregiver on falls prevention  6.  Reassess in 12 weeks or with any noted change in patient condition which places them at a risk for a fall   4-6 Moderate Risk 1. Provide assistance as indicated for ambulation activities  2. Reorient confused/cognitively impaired patient  3. Chair/bed in low position, stretcher/bed with siderails up except when performing patient care activities  4. Educate patient/family/caregiver on falls prevention     7 or   Higher High Risk 1. Place patient in easily observable treatment room  2. Patient attended at all times by family member or staff  3. Provide assistance as indicated for ambulation activities  4. Reorient confused/cognitively impaired patient  5. Chair/bed in low position, stretcher/bed with siderails up except when performing patient care activities  6. Educate patient/family/caregiver on falls prevention         PLAN: Patient is seen today in follow up. She reports mild pains at times. Dr Rodríguez Forward notified and examined pt. Pt signed consent, copy was provided. Pt to return for teach/SIM.          Gumaro Sheehan RN

## 2021-08-18 NOTE — PROGRESS NOTES
Calixto Lou                                                                                                                  8/18/2021  MRN:   [id number]  YOB: 1970  PCP:                           Warner Sesay MD  Referring Physician: No ref. provider found  Treating Physician Name: Luna Myers MD      Reason for visit:  Chief Complaint   Patient presents with    Follow-up     review status of disease    Results     go over oncotype   Reviewed results of lab work-up and discuss treatment plan    Current problems:  Invasive lobular carcinoma of left breast, grade 2, ER positive strongly, CA positive moderate, HER-2 not amplified, clinical stage T1c, N0, M0. Pathological stage T1 cN1 aM0  Oncotype recurrence score of 15  Left breast LCIS  Premenopausal status    Active and recent treatments:  Left breast lumpectomy with sentinel lymph node biopsy-7/2021  Anticipating adjuvant radiation therapy  Anticipating adjuvant hormone therapy    Summary of Case/History:    Calixto Lou a 45 y. o.female is a patient recently diagnosed with left breast lobular invasive cancer presents to the clinic to establish care and for further work-up and evaluation. Patient last mammogram done about 2 years ago was unremarkable but her latest mammogram showed asymmetrical density in the upper outer quadrant at 1230-1 o'clock position. This was further confirmed with ultrasound which showed a 1.7 cm x 1.3 cm mass. Patient underwent biopsy which confirmed a lobular cancer, grade 2. Ultrasound also showed suspicious lymph node however biopsy was negative for malignancy. Patient herself did not notice any palpable mass. Denies any breast pain nipple discharge denies any previous history of biopsy. Patient is premenopausal.    Patient age of menarche was 14 years. Age of first child was 31. Patient has 2 children. Patient is premenopausal.  She is not on birth control or hormonal medication. Does not smoke. 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 Patient has history of breast cancer in her mother at age of 58. Interim History:    Patient presents to the clinic accompanied by her  to discuss results of her Oncotype testing and further treatment plan. Patient since last office visit underwent lumpectomy without any complications. Unfortunately patient was noted to have a lymph node positive on sentinel lymph node biopsy. Patient Oncotype results show recurrence score of 15. Patient has recovered well from the surgery. Denies hospitalization or ER visit. Denies headaches dizziness chest pain shortness of breath abdominal pain nausea bone pain weight loss or fatigue. During this visit patient's allergy, social, medical, surgical history and medications were reviewed and updated.     Past Medical History:   Denies history of hypertension diabetes mellitus    Past Surgical History:     Past Surgical History:   Procedure Laterality Date    BREAST BIOPSY Left 7/20/2021    LEFT BREAST LUMPECTOMY    SENTINEL LYMPH NODE BIOPSY  (@ 07:30 )  WITH FROZEN SECTION performed by Bobby Garza DO at [address] [place] [address] Left 7/20/2021    LEFT BREAST ONCOPLASTIC RECONSTRUCTION   PECT BLOCK performed by Jeimy Oliveira MD at [address] [place] [address] MRI BREAST BX USING DEVICE LEFT Left 7/1/2021    [facility]    US BREAST NEEDLE BIOPSY LEFT Left 6/3/2021    US BREAST NEEDLE BIOPSY LEFT 6/3/2021 [facility]    US LYMPH NODE BIOPSY  6/3/2021    US LYMPH NODE BIOPSY 6/3/2021 [facility]    WISDOM TOOTH EXTRACTION         Patient Family Social History:    Family History   Problem Relation Age of Onset    Other Father         Gout    Breast Cancer Mother 59        ductal carcinoma lumpectomy w/ radiation   Dossie Acthy Migraines Mother     No Known Problems Brother     Breast Cancer Paternal Grandmother [de-identified]    Heart Attack Maternal Grandfather 76     Social History     Socioeconomic History    Marital status:      Spouse name: None    Number of children: None    Years of education: None    Highest education level: None   Occupational History    None   Tobacco Use    Smoking status: Never Smoker    Smokeless tobacco: Never Used   Substance and Sexual Activity    Alcohol use: No    Drug use: No    Sexual activity: Yes     Partners: Male   Other Topics Concern    None   Social History Narrative    None     Social Determinants of Health     Financial Resource Strain:     Difficulty of Paying Living Expenses:    Food Insecurity:     Worried About Running Out of Food in the Last Year:     Ran Out of Food in the Last Year:    Transportation Needs:     Lack of Transportation (Medical):  Lack of Transportation (Non-Medical):    Physical Activity:     Days of Exercise per Week:     Minutes of Exercise per Session:    Stress:     Feeling of Stress :    Social Connections:     Frequency of Communication with Friends and Family:     Frequency of Social Gatherings with Friends and Family:     Attends Sikhism Services:     Active Member of Clubs or Organizations:     Attends Club or Organization Meetings:     Marital Status:    Intimate Partner Violence:     Fear of Current or Ex-Partner:     Emotionally Abused:     Physically Abused:     Sexually Abused:      Current Medications:  Patient does not currently take any prescription medication    Allergies:   Wound dressing adhesive    Review of Systems:    Constitutional: No fever or chills.  No night sweats, no weight loss   Eyes: No eye discharge, double vision, or eye pain   HEENT: negative for sore mouth, sore throat, hoarseness and voice change   Respiratory: negative for cough , sputum, dyspnea, wheezing, hemoptysis, chest pain   Cardiovascular: negative for chest pain, dyspnea, palpitations, orthopnea, PND   Gastrointestinal: negative for nausea, vomiting, diarrhea, constipation, abdominal pain, Dysphagia, hematemesis and hematochezia   Genitourinary: negative for frequency, dysuria, nocturia, urinary incontinence, and hematuria   Integument: negative for rash, skin lesions, bruises. Hematologic/Lymphatic: negative for easy bruising, bleeding, lymphadenopathy, or petechiae   Endocrine: negative for heat or cold intolerance,weight changes, change in bowel habits and hair loss   Musculoskeletal: negative for myalgias, arthralgias, pain, joint swelling,and bone pain   Neurological: negative for headaches, dizziness, seizures, weakness, numbness        Physical Exam:    Vitals: /78   Pulse 78   Temp 99.1 °F (37.3 °C) (Oral)   Wt 144 lb (65.3 kg)   BMI 21.64 kg/m²   General appearance - well appearing, no in pain or distress  Mental status - AAO X3  Eyes - pupils equal and reactive, extraocular eye movements intact  Mouth - mucous membranes moist, pharynx normal without lesions  Neck - supple, no significant adenopathy  Lymphatics - no palpable lymphadenopathy, no hepatosplenomegaly  Chest - clear to auscultation, no wheezes, rales or rhonchi, symmetric air entry  Heart - normal rate, regular rhythm, normal S1, S2, no murmurs  Abdomen - soft, nontender, nondistended, no masses or organomegaly  Neurological - alert, oriented, normal speech, no focal findings or movement disorder noted  Extremities - peripheral pulses normal, no pedal edema, no clubbing or cyanosis  Skin - normal coloration and turgor, no rashes, no suspicious skin lesions noted       DATA:    Results for orders placed or performed during the hospital encounter of 07/20/21   HCG, SERUM, QUALITATIVE   Result Value Ref Range    hCG Qual NEGATIVE NEGATIVE   Surgical Pathology   Result Value Ref Range    Surgical Pathology Report       -- Diagnosis --    1. Left axillary sentinel lymph nodes    -  Positive for metastatic lobular carcinoma (1/3).     2.  Left breast, additional anterior margin:    -  Negative for carcinoma. 3.  Left breast, excisional lumpectomy:    -  Invasive lobular carcinoma, grade 2.    -  Carcinomas 1.2 cm.    -  Previously reported to be ER positive, SC positive and HER-2 not  amplified.    -  Pleomorphic lobular carcinoma in situ is present. -  Margins are free of involvement by carcinoma. WILL Rubio  **Electronically Signed Out**         rdd/7/22/2021       Clinical Information  Pre-op Diagnosis:  LEFT BREAST CARCINOMA   Operative Findings:  LEFT AXILLARY SENTINEL NODES; ADDITIONAL ANTERIOR  MARGIN  DOUBLE SUTURE BLACK IS NEW ANTERIOR MARGIN, LONG SINGLE BLACK  IS INFERIOR MARGIN; LEFT BREAST  DOUBLE SHORT BLACK SUTURE IS SUPERIOR  MARGIN, SINGLE LONG BLACK SUTURE IS LATERAL MARGIN, DOUBLE LONG BLACK  SUTURE IS MEDIAL MARGIN, BLUE SUTURE IS DEEP MARGIN  Operation Performe d:  LEFT BREAST LUMPECTOMY SENTINEL LYMPH NODE  BIOPSY WITH FROZEN SECTION AND POSSIBLE NODE DISSECTION, LEFT BREAST  ONCOPLASTIC RECONSTRUCTION WITH OSVALDO INCISIONAL WOUND VAC, PECT BLOCK    Source of Specimen  1: LEFT AXILLARY SENTINEL NODES  2: ADDITIONAL ANTERIOR MARGIN  3: LEFT BREAST    Gross Description  1. \"TERESITA SALGADO LEFT AXILLARY SENTINEL NODES\" Received three  portions adipose. A fatty node is 1.0 cm, all as FS (AFS) with H&E  TP.  12.0 x 10.0 x 9.0 mm adipose has an 8.0 x 5.0 x 5.0 millimeters  node, all for FS (BFS). Third portion of adipose is 2.0 x 1.2 x 1.2  cm and lacks a grossly discernible node, all submitted for permanent,  \"C.\"  FS x 2, 3cs/NS. 2.  \"TERESITA SALGADO, ADDITIONAL ANTERIOR MARGIN\" 3.1 x 2.1 x 0.7 cm  (M-L x S-I x A-P) oriented portion of fibrofatty tissue. The deep  margin is inked black, superior blue, inferior red and anterior green. Sectioning reveals fibrotic cut surfaces with no masses. Cassette  summary:  \"A-C\" specimen entirely seria lly submitted from medial to  lateral.  3.   \"TERESITA SALGADO DOUBLE SHORT BLACK SUTURE IS SUPERIOR MARGIN,  SINGLE LONG BLACK SUTURE IS LATERAL MARGIN, DOUBLE LONG BLACK SUTURE  IS MEDIAL MARGIN, BLUE SUTURE IS DEEP MARGIN\" 7.1 x 6.6 x 2.8 cm (S-I  x M-L x A-P) oriented portion of fibrofatty tissue. The deep margin  is inked black, superior blue, inferior red and anterior green. Sectioning reveals a 1.2 x 1.0 x 0.9 cm ill-defined tan-white mass  lesion surrounded by dense fibrous tissue. Within the lesion is a  biopsy site with a spiral-shaped marker. The lesion is < 0.1 cm from  the anterior margin, 0.5 cm from the deep margin and at least 1.0 cm  from the remaining margins. Dense fibrous tissue extends to the  inferior margin, anterior margin and lateral margin. Cassette summary:   \"A-K\" representative sections sequentially submitted from medial to  lateral with the medial and lateral margins submitted as shaves and  the remaining margins perpendicular. Region of the lesion is i n  \"B-I. \"  All six margins are represented. tm    Intraoperative Diagnosis  FSDX:  Lymph nodes (X2), negative for carcinoma.  (RDD, 11:02)Note: A  small focus of infiltrating lobular carcinoma is present in one lymph  node present in tissue not sampled by frozen section (not present on  the frozen sections). Microscopic Description  1-3. INVASIVE BREAST CARCINOMA         PROCEDURE: Lumpectomy, sentinel node biopsies                                      SPECIMEN LATERALITY & TUMOR SITE: Left breast, prior biopsy  designated 1230                   TUMOR SIZE (LARGEST INVASIVE COMPONENT): 1.2 x 1.0 x 0.9 cm       HISTOLOGIC TYPE OF INVASIVE CARCINOMA:       HISTOLOGIC GRADE (PUNEET)--       - GLANDULAR/TUBULAR DIFFERENTIATION SCORE: 3       - NUCLEAR PLEOMORPHISM SCORE: 2       - MITOTIC RATE SCORE: 1       - OVERALL GRADE (FROM PUNEET TOTAL SCORE): Grade 2 (of 3). Note: Part 3 shows pleomorphic lobular carcinoma in situ associated  with the invasive lobular carcinoma. Part 2 shows a focus of atypical  lobular hyperplasia.   The case is discussed with Dr. Marcos Cuhrchill. DUCTAL CARCINOMA IN SITU: Absent       TUMOR EXTENT       - SKIN (INVASION, SATELLITE FOCI): Not sampled       - NIPPLE: Not sampled       - SKELETAL MUSCLE: Not sampled         MARGINS - INVASIVE CARCINOMA -  - SITE(S) OF ALL POSITIVE MARGINS (SPECIFY EXTENT): Margins are free  of involvement by invasive carcinoma  - IF ALL NEGATIVE--  --SITE(S) AND DISTANCE TO CLOSEST:Invasive carcinoma: Closest margin  is 5 mm, distance to the deep margin. Next closest margin is 7 mm,  distance to the inferior margin. Anterior aspect in part 3 is free  but narrow at <1 mm, but >10 mm distant considering part 2 additional  anterior margin tissue. Pleomorphic lobular carcinoma in situ: All  margins are free of pleomorphic lobular carcinoma in situ, all >10 mm  distant. LYMPHOVASCULAR INVASION: Absent         REGIONAL LYMPH NODES -       - NUMBER EXAMINED (SENTINEL AND NON-SENTINEL): 3       - NUMB ER OF SENTINEL: 3 (a third node is found in the adipose tissue  not sampled by frozen section, cassette C, on permanent sections. Focus of metastatic carcinoma is present in this third node). - NUMBER WITH MACROMETASTASES: 1       - NUMBER WITH MICROMETASTASES:      0  - NUMBER WITH ISOLATED TUMOR CELLS: 0       - SIZE OF LARGEST METASTATIC DEPOSIT: Subcapsular focus with 4 mm  largest linear dimension       - EXTRANODAL EXTENSION: AbsentNote: Lymph nodes are evaluated through  levels and by control appropriate immunostaining with pancytokeratin. TREATMENT EFFECT: RESPONSE TO PRE-SURGICAL  (NEOADJUVANT) THERAPY (IF APPLICABLE)--       - IN BREAST TISSUES: N/A       - IN LYMPH NODES:.  N/A         DISTANT METASTASIS pM  (only if confirmed pathologically in this case): N/A    PATHOLOGIC STAGE CLASSIFICATION (pTNM):     pT1c  pN1a  (T and /or N descriptors used only if applicable)    CAP InvasiveBreast 4500 in conjunction with AJCC 8 ed.     BIOMARKER  TESTING  BREAST CARCINOMA total of 600 microcuries of technetium pertechnetate was injected subcutaneously into the 4 poles about the left nipple in preparation for left sentinel node biopsy. Post-injection lymphoscintigraphy demonstrated migration of the radioisotope into the left axillary region  Patient was then taken to the OR for probe guided lymph node localization after injection. Kaylan Camacho HISTORY: ORDERING SYSTEM PROVIDED HISTORY: Malignant neoplasm of left female breast, unspecified estrogen receptor status, unspecified site of breast [facility]) TECHNOLOGIST PROVIDED HISTORY: Is the patient pregnant?->No Reason for Exam: Left Breast CA Acuity: Unknown Type of Exam: Unknown     Lymph nodes localized with probe in the OR. Impression:  Invasive lobular carcinoma of left breast, grade 2, ER positive strongly, NE positive moderate, HER-2 not amplified, clinical stage T1c, N0, M0. Pathological stage T1 cN1 aM0  Recurrence score of 15  Left breast LCIS  Axillary lymph node cortical thickening  Premenopausal status    Plan:  I had a detailed discussion with the patient and personally went over results of lab work-up imaging studies and other relevant clinical data  Reviewed patient's surgical report. Reviewed pathology report from surgery. Patient has been upstaged due to involvement of lymph node. Patient continues to be premenopausal  Recurrence score is 15. Reviewed risk of recurrence with and without chemotherapy. There is no significant risk reduction with addition of chemotherapy based on the recurrence score of 15 in premenopausal woman would recommend against any adjuvant chemotherapy  Discussed natural history of breast cancer  Patient will need adjuvant hormonal therapy, tamoxifen.   Agree with planning for adjuvant radiation therapy  Discussed role of adjuvant hormone therapy in reducing risk of recurrence as well as for chemoprevention  We will see patient back in office at conclusion of radiation therapy to discuss hormonal therapy at that point  Patient is very happy to know that she will not be requiring adjuvant chemotherapy. NCCN guidelines were reviewed and discussed with the patient. The diagnosis and care plan were discussed with the patient in detail. I discussed the natural history of the disease, prognosis, risks and goals of therapy and answered all the patients questions to the best of my ability. Patient expressed understanding and was in agreement. Ana Villarreal MD          I spent more than forty minutes examining, evaluating, reviewing data, counseling the patient and coordinating care. Greater than 50% of time was spent face-to-face with the patient this note is created with the assistance of a speech recognition program.  While intending to generate a document that actually reflects the content of the visit, the document can still have some errors including those of syntax and sound a like substitutions which may escape proof reading. It such instances, actual meaning can be extrapolated by contextual diversion.

## 2021-08-18 NOTE — TELEPHONE ENCOUNTER
AVS from 8/18/21    rv in 6 weeks    RV scheduled 9/29/21 @ 10:15am    PT was given AVS and an appt schedule    Electronically signed by Ammy Pena on 8/18/2021 at 4:03 PM

## 2021-08-19 ENCOUNTER — HOSPITAL ENCOUNTER (OUTPATIENT)
Dept: PHYSICAL THERAPY | Facility: CLINIC | Age: 51
Setting detail: THERAPIES SERIES
Discharge: HOME | End: 2021-08-19
Payer: COMMERCIAL

## 2021-08-19 PROCEDURE — 97110 THERAPEUTIC EXERCISES: CPT

## 2021-08-19 PROCEDURE — 97161 PT EVAL LOW COMPLEX 20 MIN: CPT

## 2021-08-19 PROCEDURE — 97140 MANUAL THERAPY 1/> REGIONS: CPT

## 2021-08-19 NOTE — PROGRESS NOTES
numbness. She denies any headaches, dizziness, chest pain, shortness of breath, dumping, nausea, diarrhea, weight loss, bone pain, or bleeding. Patient had Oncotype testing done which came back at 15 and will likely not need chemotherapy. MEDICATIONS:    Current Outpatient Medications:     Multiple Vitamins-Minerals (THERAPEUTIC MULTIVITAMIN-MINERALS) tablet, Take 1 tablet by mouth daily, Disp: , Rfl:     clobetasol (TEMOVATE) 0.05 % ointment, Apply topically 2 times daily. , Disp: 45 g, Rfl: 0    ALLERGIES:  Allergies   Allergen Reactions    Wound Dressing Adhesive Rash     Steri strips          REVIEW OF SYSTEMS:    A full 14 point review of systems was performed and assessed and found to be negative except as noted above. PHYSICAL EXAMINATION:    CHAPERONE: Family/friend/companieon Present    ECO Asymptomatic    VITAL SIGNS: /78   Pulse 75   Temp 99.1 °F (37.3 °C)   Resp 14   Wt 144 lb 6.4 oz (65.5 kg)   SpO2 98%   BMI 21.70 kg/m²   GENERAL:  General appearance is that of a well-nourished, well-developed in no apparent distress. HEENT: Normocephalic, atraumatic, EOMI, moist mucosa, no erythema. NECK:  No adenopathy or a palpable thyroid mass, trachea is midline. LYMPHATICS: No cervical, supraclavicular, or axillary adenopathy. HEART:  Regular rate and rhythm, S1, S2, no murmurs. LUNGS:  Clear to auscultation bilaterally with no wheezing or crackles. ABDOMEN:  Soft, nontender, non distended. EXTREMITIES:  No clubbing, cyanosis, or edema. No calf tenderness. MSK:  No CVA or spinal tenderness. NEUROLOGICAL: No focal deficits. CN II-XII intact. Strength and sensation intact bilaterally. SKIN: No erythema or desquamation. BREAST: Deferred. Healing scar in LUOQ.       LABS:  WBC   Date Value Ref Range Status   2021 7.4 3.5 - 11.0 k/uL Final     Segs Absolute   Date Value Ref Range Status   2021 4.90 1.8 - 7.7 k/uL Final     Hemoglobin   Date Value Ref Range Status   06/18/2021 15.1 12.0 - 16.0 g/dL Final     Platelets   Date Value Ref Range Status   06/18/2021 253 140 - 450 k/uL Final     No results found for: , CEA  No results found for: PSA    PATHOLOGY:   7/20/21 Lump SLN  -- Diagnosis --     1.  Left axillary sentinel lymph nodes     -  Positive for metastatic lobular carcinoma (1/3). 2.  Left breast, additional anterior margin:     -  Negative for carcinoma. 3.  Left breast, excisional lumpectomy:     -  Invasive lobular carcinoma, grade 2.     -  Carcinomas 1.2 cm. -  Previously reported to be ER positive, SC positive and HER-2 not   amplified. -  Pleomorphic lobular carcinoma in situ is present. -  Margins are free of involvement by carcinoma. ASSESSMENT AND PLAN:  Waunita Rubinstein is a 45 y.o. female with a Cancer Staging  Malignant neoplasm of upper-outer quadrant of left breast in female, estrogen receptor positive ([facility] [address]  Staging form: Breast, AJCC 8th Edition  - Clinical stage from 6/3/2021: Stage IA (cT1c, cN0, cM0, G2, ER+, SC+, HER2-) - Signed by Constantine Murphy MD on 6/21/2021  - Pathologic stage from 7/20/2021: Stage IA (pT1c, pN1a(sn), cM0, G2, ER+, SC+, HER2-) - Signed by Constnatine Murphy MD on 8/19/2021      We reviewed with the patient and family the testing that has been done so far, including imaging and pathology, and her symptoms of skin irritation. We also reviewed their staging based on the testing that as been done and the recommendations per the NCCN guidelines. We discussed the options of treatment, including surgery, chemotherapy, and radiation, and the rationale of why radiation therapy would be indicated for this diagnosis. We also discussed that they have the option to not pursue our recommended treatment as well. Given the patients early stage diagnosis and limited lymph node involvement, we reviewed that treated directed at the breast and axillary lymph nodes would be recommended.      We reviewed the logistics of radiation treatment planning, including a CT Simulation session, as well as daily treatments for approximately 4 weeks. With regards to radiation to the left breast, I discussed the possible short-term side effects of fatigue, skin irritation (causing redness, dryness, or peeling), swelling and tenderness of the left breast, tiredness, low blood counts (causing infection or bleeding) and hair loss in treated area. Possible long-term side effects discussed included change in the cosmetic appearance of the left breast (change in shape, size, and texture of the breast), hyper/hypo-pigmentation of the skin, limited range of motion of the left shoulder, scarring of the lung (causing shortness of breath and cough), damage to the heart, swelling of the left arm i.e. lymphedema (causing pain), damage to the nerves (causing numbness, weakness, or paralysis) and rib fracture. We discussed the use of deep inspirative breath holding techniques during planning and treatment to help reduce the risk of toxicities to the heart. After ample time to review the patient's questions, an informed consent was obtained to proceed with scheduling a treatment planning session for radiation therapy. Patient was in agreement with my recommendations. All questions were answered to their satisfaction. Patient was advised to contact us anytime should they have any questions or concerns. Electronically signed by Magali Etienne MD on 8/19/2021 at 2:30 PM        Medications Prescribed:   New Prescriptions    No medications on file       Orders:   Orders Placed This Encounter   Procedures   [address]:  Patient Care Team:  Lucio Emanuel MD as PCP - General (Family Medicine)  Lucio Emanuel MD as PCP - [facility] Empaneled Provider  Viet Salgado RN as Nurse Navigator (Oncology)     Total time spent on this case on the day of encounter is more than 30 minutes.  This time includes combination of one or more of the following - review of necessary tests, review of pertinent medical records from the EMR, performing medically appropriate examination and evaluation, counseling and educating the patient/family/caregiver, ordering necessary medical tests, procedures etc., documenting the clinical information in the electronic medical record, care coordination, referring and communicating with other health care providers and interpretation of results independently. This note is created with the assistance of a speech recognition program.  While intending to generate a document that actually reflects the content of the visit, the document can still have some errors including those of syntax and sound a like substitutions which may escape proof reading. It such instances, actual meaning can be extrapolated by contextual diversion.

## 2021-08-19 NOTE — CONSULTS
[] [facility]  [address]    [phone]  F: [phone]   [] [address] [facility] [address]   [address]  P: [phone]  F: [phone]  [x] [address]  [address] [facility]  P: [phone]  F: [phone]  [] [address]  [facility]   [address]   [place]: [phone]  F: [phone]   [] [facility]  [address] [facility] [address]  [place]: [phone]   F: [phone]     PHYSICAL THERAPY EVALUATION - ONCOLOGY REHABILITATION    Date:  2021  Patient: Rudolph Collet  : 1970  MRN: [id number]  Physician: Jesus Salazar MD   Insurance: [employer]  hard max  Medical Diagnosis: Yrn Conley. eoplasm of left breast, ER+, unspecified site of breast  Rehab Codes: C50.912, Z17.0  Onset date: 21 surg Next Dr's appt.: 21, ongoing     Subjective:   CC: Pt c/o limited L arm ROM, numbness post inner arm, pulling under armpit, stinging pain in breast and axilla and tightness. Pt to see lymphedema soon and have radiation planning next week. Pushing, reaching and pulling are difficult. To begin radiation soon. HPI: (onset date) prev L frozen shld  6+ yrs ago    Type of Cancer  Location: Invasive lobular carcinoma of left breast, grade 2, ER positive strongly, IN positive moderate, HER-2 not amplified, clinical stage T1c, N0, M0. Pathological stage T1 cN1 aM0. Upstaged 2° LN involvement  Lymph node Involvement-  1+/3 SLNB  Chemotherapy- Anticipating adjuvant hormone therapy- tamoxifen. Radiation- planning next week, then will plan for 16 treatments?   Surgery- lumpectomy, reconstruction 21    PMHx: [] Unremarkable [] Diabetes [] HTN  [] Pacemaker   [] MI/Heart Problems [x] Cancer [] Arthritis [] Other:        Medications: [x] Refer to full medical record [] None [] Other:  Allergies:      [x] Refer to full medical record [] None [] Other:    Function:  Hand Dominance  [x] Right  [] Left    Pain:  [x] Yes  [] No Location: L breast, axilla, UE Pain Rating: (0-10 scale) 3/10  Pain altered Tx:  [] Yes  [] No  Action:  Symptoms:  [] Improving [] Worsening [x] Same  Sleep: [] OK    [x] Disturbed normally on side, not comfortable    Patient lives with:    [employer]   Job Status [x]  Normal duty   [] Light duty   [] Off due to condition    []  Retired   [] Not employed   [] Disability  [] Other:  [x]  Return to work: 8/12/21   Work activities/duties Resident Assist.          TEST INITIAL DATE DISCHARGE DATE: VISIT#   6 MIN WALK Baseline/end of test Baseline/end of test   HR (bpm)     SpO2     dyspnea     fatigue     Distance (ft)     Blood Pressure     Brief Fatigue Inventory  (0: none, 1-3: mild, 4-6: moderate,   7-10: severe) 2.1    Functional Test     UEFS 59/80, 73%      Objective:     ROM  °A/P END FEEL STRENGTH    Left Right  Left Right   Shld Flex 111 pulling chest/axilla 140  4+ 5    Shld Abd 100 162  4+ 5    Shld IR T12   5 5   ER @ 0 45  90 56 68  5 5   Elbow flex WFL WFL  5 5   ext [facility] WFL  5 5                               OBSERVATION No Deficit Deficit Not Tested Comments   Forward Head [] [x] []    Rounded Shoulders [] [x] []    Kyphosis [] [] []    Scap Height/Position [] [x] []    Winging [] [] []    SH Rhythm [] [] []    INSPECTION/PALPATION    Incision still healing w/ scabbing and glue in tact. Mod firmness and tissue restriction lateral breast and incision.  Moderate cording from ~1 inch inferior incision to mid bicep, min TTP  Mod muscular tightness pec major/minor, scapular M's, subscap and latts       FUNCTION Normal Difficult Unable   Overhead reach [] [x] []   Underarm reach  [] [] []   Groom/Dress [] [x] []   Bra/Shirt tuck [] [] []   Lift/Carry [] [x] []    [] [] []     Measurements Upper Extremity:  from prehab visit Br clinic, pt will be seeing lymphedema soon, see for measurements**  Measurements in Centimeters  Right Left      Dorsum 14 20.1 19.2    Wrist 20 15 15    Lower Forearm         Upper Forearm  35 22.5 22.5    Olecranon  43 24.5 24.5    Lower Bicep  50 26 26.5    Upper Bicep/SHLD 64 30.5 30.5    Total 138.6  138. 2        []Heaviness in arm? [x]Numbness or tingling in chest/arm? Posterior arm and axilla  [] Swelling in chest, armpit, arm? []Discomfort with clothing? Marks from tightness? Assessment:  Problems:    [x] ? Pain: L chest, axilla, shoulder  [x] ? ROM: L shoulder, upper quarter musculature, scar tissue  [x] ? Strength: L shoulder  [x] ? Function: dist sleep, difficulty w/ ADL's; lifting, pushing/pulling and reaching   [] Other:    STG: (to be met in 10 treatments)  1. ? Pain: Stabilize L shoulder/chest/axillary pain and ensure optimal management of pain during all ADL's (home, occupation, community and recreation)  2. Optimize AROM of bilateral shoulders for functional activity. 3. Increase strength in L shoulder and scapula to grossly 5/5 for good postural stability. 4. ? Function: Able to sleep, complete basic ADL's, lift/carry and reach OH w/o difficulty  5. Independent with Home Exercise Programs  6. Education on signs and symptoms, precautions regarding lymphedema. -MET        7. Educated breast care exercises MET    LTG: (to be met in 20 treatments)  1. Maintain /optimize AROM of bilateral shoulders for functional activity to improve QOL. 2.   Decr BFI score by 1+ for improved narda to activity, incr UEFS by 15% for overall increased function  3. Continue activity to decrease risk factors associated with increased time being sedentary while undergoing chemotherapy, radiation, surgery. 4.   Improve tissue mobility of chest wall and axilla to allow for more normal motion and function  5.    Control/mitigate side effects/late effects of Cancer treatment presentation (progression) [x] Stable [] Evolving  [] Unstable   Decision Making [x] Low [] Moderate [] High    [x] Low Complexity [] Moderate Complexity [] High Complexity       Treatment Charges: Mins Units   [x] Evaluation       []  Low        []  Moderate       []  High 15 1   []  Modalities     [x]  Ther Exercise 15 1   [x]  Manual Therapy 15 1   []  Ther Activities     []  Aquatics     []  Vasocompression     []  Other       TOTAL TREATMENT TIME: 45    Time in: 6:00 pm    Time Out: 6:45 pm    Electronically signed by: Kin Mae PT        Physician Signature:________________________________Date:__________________  By signing above or cosigning this note, I have reviewed this plan of care and certify a need for medically necessary rehabilitation services.      *PLEASE SIGN ABOVE AND FAX BACK ALL PAGES*

## 2021-08-23 ENCOUNTER — HOSPITAL ENCOUNTER (OUTPATIENT)
Dept: PHYSICAL THERAPY | Facility: CLINIC | Age: 51
Setting detail: THERAPIES SERIES
Discharge: HOME | End: 2021-08-23
Payer: COMMERCIAL

## 2021-08-23 ENCOUNTER — HOSPITAL ENCOUNTER (OUTPATIENT)
Dept: OCCUPATIONAL THERAPY | Age: 51
Setting detail: THERAPIES SERIES
Discharge: HOME | End: 2021-08-23
Payer: COMMERCIAL

## 2021-08-23 PROCEDURE — 97110 THERAPEUTIC EXERCISES: CPT

## 2021-08-23 PROCEDURE — 97535 SELF CARE MNGMENT TRAINING: CPT

## 2021-08-23 PROCEDURE — 97140 MANUAL THERAPY 1/> REGIONS: CPT

## 2021-08-23 PROCEDURE — 97166 OT EVAL MOD COMPLEX 45 MIN: CPT

## 2021-08-23 NOTE — CONSULTS
TREATMENT LOCATION:   [x] C/ [facility] [address] [place] [address] [place] [address]: [phone]  F: [phone] [] [address]: [phone]  F: [phone]      Lymphedema Services - Initial Evaluation for Upper Extremity    Date:  2021  Patient: Isaak Topete  : 1970             MRN: [id number]  Referring Physician: Rosa Adorno MD       Phone: [phone]  Fax: [phone]  Insurance: [facility] (TQ:[id number] ) -  visits (hard max)  Medical Diagnosis: C50.412, Z17.0  Rehab Codes: I89.0   Onset Date: 21  Visit# / total visits: 1/2 scheduled; Progress note for Medicare patient due at visit 10   ( Certification Dates: 2021 - 21)    Allergies:  [] None       [] Latex       [x] Adhesive tape (wound dressing adhesive)       [] Medications    [] Other  Medications: See charted information in Epic  Past Medical History: See charted information in Epic     Restrictions: No blood pressure/blood draw in: LUE  Fall Risk:   [x] No    [] Yes   If yes, intervention:       Overview: Patient is a 45year old female referred to the Lymphedema Clinic with a diagnosis of left Upper Extremity Lymphedema 2/2 CA treatment. Pt is currently stage 0 with LUQ being monitored and preventative intervention taking place at this time. SUBJECTIVE:  Pt reports she is having numbness in tricep area and armpit, but getting better. No current swelling. Some mild cording.      Hx of Complete Decongestive Therapy:[]Yes - Date:        [x]No        Pain:  [] YES    [x] NO   Location: n/a     Pain Rating: ( 0-10 scale) : 0/10  Comments:     History of Cancer: [x]Yes []No   Location:  L breast              Date of Diagnosis: 2021  Currently undergoing treatments at evaluation: [x]Yes  []No    Surgery: Yes, L lumpectomy 2021    Node Dissection: 1+/3 L axillary nodes    Chemotherapy: No no   Radiation: Pending pending   Hormone Therapy: pending   Cancer Related Fatigue: no   Is dominant extremity affected? []Yes  [x]No  [] N/A     Comorbidities:   [] Obesity [] Dialysis  [] Other:   [] Asthma/COPD [] Dementia [] Other:   [] Stroke [] Sleep apnea [] Other:   [] Vascular disease [] Rheumatic disease [] Other:     Absolute Lymphedema Contraindications - treatement [x] NONE     [] CHF (only if patient is un-medicated or edema is solely d/t cardiac failure)    [] DVT- Acute, No MLD to limb       [] Advanced Renal Disease - Need Physician Clearance   [] Acute Skin Infection ( e.g. Cellulitis, Ersipelas)   [] Hyperthyroidism   [] Cardiac Arrhythmia   [] Hypersensitive Carotid Sinus    Absolute Contraindications Regarding the Deep Abdomen: [x] NONE    [] Pregnancy    [] Abdominal Aortic Aneurism - Current or history of    [] Inflammatory Disease of bowel or intestines   [] Severe Arteriosclerosis    [] Intra-abdominal scar formations following surgery   [] Recent abdominal surgery   [] Pelvic DVT- Current or history of   [] Presence of clot prevention devices ( e.g. - [place] Filter)     Relative Lymphedema Contraindications [x] NONE      [] Malignant Lymphedema - Edema is being caused by active cancer. MLD and CDT considered palliative during active cancer treatments. Physician approval required. [] Age 61+    [] Limb paralysis     Home/Work Environment  Patient lives with: Spouse   In what type of home []  One story   [x] Two story - bed/bath downstairs   [] Split level  [] Apartment   Number of stairs to enter 2   With handrail on the []  Right to enter   [] Left to enter   Bathroom has a []  Tub only  [x] Tub/shower combo   [] Walk in shower    []  Grab bars   Washing machine is on [x]  Main level   [] Second level   [] Basement   Employer  in assisted living    Job Status [x]  Normal duty   [] Light duty   [] Off due to condition    []  Retired   [] Not employed   [] Disability  [] Other:  []  Return to work:    Work activities/duties A lot of walking, but able to sit. Most pt's require minimal to no assist so no heavy lifting. Enjoys swimming. Jogging/walking & a little bit of weights for working out. ADL/IADL Previous level of function Current level of function Who currently assists the patient with task   Bathing  [x] Independent  [] Assist [x] Independent  [] Assist    Dress/grooming [x] Independent  [] Assist [x] Independent  [] Assist    Transfer/mobility [x] Independent  [] Assist [x] Independent  [] Assist    Feeding [x] Independent  [] Assist [x] Independent  [] Assist    Toileting [x] Independent  [] Assist [x] Independent  [] Assist    Driving [x] Independent  [] Assist [x] Independent  [] Assist    Housekeeping [x] Independent  [] Assist [x] Independent  [] Assist    Grocery shop/meal prep [x] Independent  [] Assist [x] Independent  [] Assist      Gait Prior level of function Current level of function    [x] Independent  [] Assist [x] Independent  [] Assist   Device: [] Independent [] Independent    [] Straight Cane [] Quad cane [] Straight Cane [] Quad cane    [] Standard walker [] Rolling walker   [] 4 wheeled walker [] Standard walker [] Rolling walker   [] 4 wheeled walker    [] Wheelchair [] Wheelchair         OBJECTIVE    Physical Status:   Range of motion: Deficits [x] Yes [] No       Comment: Pt working with PT  Strength:         Deficits [x] Yes [] No        Comment: Pt working with PT    Presentation of Affected Areas  Location: left upper quadrant at risk    Description: some mild firmness & deformity compared to unaffected side. Will continue to monitor for signs/symptoms of swelling.  Otherwise, WNL other than discoloration from skin reaction to adhesives     Scars L breast (lumpectomy) & L axilla L lumpectomy (breast) & L axilla   Wounds None none   Sensation numbness to L underarm (tricep area) and axilla localized to L underarm   Additional Comments: Pt also with some mild cording palpated in L axilla. Circumferential Measurements  Measurements taken from nail base of D3 digit. Fingers are measured at the base. Measurements (cm) Right Left   Dorsum  11 18.7 17.7   Wrist  18 15.5 15.4   Lower Forearm 23 15.5 15.0   Upper Forearm  37 23.5 23.0   Olecranon  44 24.0 24.1   Lower Bicep  51 26.6 26.0   Upper Bicep  61 29.5 29.2   Initial Total 8/23/2021:   153.3 150.4           ASSESSMENT: Pt is currently stage 0 lymphedema (pre-symptomatic) and would benefit from prophylactic education & treatment to include information regarding risk reduction practices to decrease risk of disease progression & infection, signs/symptoms of disease progression, preventative compression garments, and follow up for monitoring. Pt is educated utilizing the following handout that includes information regarding signs/symptoms to be aware of to prevent disease progression & compression sleeve wearing schedule that will also aid in prevention of disease progression. Pt is also educated on benefits of compression bra for containment of chest as well should it be needed. Compression therapy is critically important while pt is asymptomatic (stage 0 lymphedema) to prevent the development of symptomatic disease (stages 1-3 lymphedema). Pt is also educated on an impaired lymphatic system 2/2 CA treatment vs. a healthy lymphatic system and how it relates to swelling and skin integrity. Patient would benefit from skilled occupational therapy services in order to provide the education necessary to prevent symptomatic lymphedema and to educate on long term management of condition.  Treatment may include the following:    []Bandaging   []Self-Bandaging/Caregiver Training   [x]MLD    [x]Self-MLD   [x] Therapeutic Exercise    [x] Education/Instruction in home management program  [] Education and trial of a Vasopneumatic Pump    [x] Education and transition to long term management devices such as velcro compression garments and/or daytime compression sleeve/stocking(s)   [x]Discharge to Home Program     Pt is measured for preventative compression sleeve (Mediven Bonner with silicone band size II). Writer to assist with order via vendor who will verify insurance benefits. Early Signs/Symptoms of Swelling    Be on the lookout for the following symptoms at the at risk area, such as the arm, breast/chest, or armpit of the affected side.  Limb heaviness (most common initial symptom)   Pain, pulling, discomfort, and/or tightness in at risk area   Change in skin appearance/texture such as discoloration or tissue hardening   Leakage or \"weeping\" of clear fluid through the skin   Tenderness, tingling, burning sensation down the arm   Visible swelling of area      Preventative Sleeve Wearing Schedule    It is recommended that you obtain a compression arm sleeve for preventative purposes for your at-risk arm. If your radiation is pending, please wear your sleeve as a preventative measure during the activities below. Once you begin radiation, wear daily during treatment and for 30 days to follow and then you may return to preventative wear. The list below contains activities that could overwhelm an already impaired lymphatic system, either from lymph node removal during surgery or damage caused by radiation, and cause onset of swelling. If a compression sleeve is worn during these activities, the risk is greatly reduced. Should you experience any of the above symptoms, please consult your lymphedema therapist to discuss modifications to your wearing schedule.      Airplane travel   Exercise   Heavy chores/work   Any task that requires repetitive arm movement    *Should you start experiencing any of the signs/symptoms of early onset swelling above, it is recommended that you return to daily sleeve wearing x30 days in order to prevent onset of visible swelling and follow up with your lymphedema therapist.*     Avoid applying moisturizers immediately before putting on your sleeve as this may break down the material.     Wash your sleeve per 's guidelines to maintain its integrity for best compression. If you are wearing your sleeve daily, please put your sleeve on first thing in the morning, or after your shower if shower in the mornings, and remove before bedtime. Compression sleeves SHOULD NOT be worn to bed due to risk of tourniquet effect. Response to treatment: Pt verbalizes and is agreeable to the instructions/ POC established at today's evaluation. PLAN FOR NEXT VISIT: review sleeve wearing schedule, risk reduction techniques, continue to monitor for symptoms      Lymphedema Stage per ISL Guidelines    [x] 0: Subclinical with no evidence on physical exam  [] 1:  Early onset, swelling/heaviness, pitting edema, subsides with limb elevation  [] 2:  More advanced, fibrosis resulting in non-pitting edema, does not respond to elevation, thickening of the tissues  [] 3: Elephantiasis, pitting absent, huge limbs with dry/scaly, papillomatosis, hyperkeratosis, fluid may be leaking with recurrent cellulitis. Acanthosis (deep body folds). Elevation &   diuretics ineffective. Therapy Goals  Short Term Goals to be met within 2 visits  Pt will demonstrate compliance of maintaining lymphedema precautions to reduce the risks of progression of lymphedema beyond stage 0/pre-symptomatic stage. Progressing 8/23    Pt will be able to verbalize early signs/symptoms of lymphedema in order to effectively prevent onset of symptomatic lymphedema to LUQ. Pt will demo understanding of preventative compression sleeve wearing schedule along with independent donning/doffing to prevent onset of symptomatic lymphedema.   Progressing 8/23    Long Term Goals to be met within 4 visits    Pt will be monitored for progression into lymphedema stages 1 and beyond as evidenced by measurable change in swelling, observable skin changes, and/or subjective pt reports at which time goals will be updated accordingly to reflect need for decongestive therapy. Patient's Goal: Prevent severe swelling    Response to Education Provided:  [x] Verbalized understanding   [] Demonstrates/verbalizes HEP/Education previously given      [x] Needs continued review            [] No understanding   Learner(s): [x] Patient  [] Spouse [] Family  [] Other:   Method(s): [x] Verbal [] Demonstration [x] Handouts [] Exercise booklet   Family available to assist with home program if needed: [x] Yes [] No    FREQUENCY: Pt will be seen 1 x/ week for 4 visits and will follow up as appropriate. Focus is to remain on short and long term goals listed above. Rehabilitation Potential/Commitment: [x] Good [] Fair     [] Poor    Functional Assessment Used: Lymphedema Life Impact Scale (LLIS) Score: [x] Eval 5%   [] 10th visit____  [] D/C ____     Clearance needed:  []Yes    [x]No                    Treatment Charges   Minutes   Units   Evaluation                                                             $95.15 / $75.40            Low   (05691)            Moderate   (89353) 40 1          High   (51688)     Manual Therapy (40713):                                      $26.92 / $21.34     Therapeutic activities (92766):                             $37.46/ $26.79     Therapeutic Exercise (44882)                              $29.21/$ 22.84     Self care/home mgmt (16433)                              $32.39 / $24.43 25 2   Vasopneumatic Device (18206)                           $9.21     Total Treatment Time    65 3         Time In:  0730  Time Out: 7616      Electronically signed by Guilherme Mera OT on 8/23/2021 at 7:37 AM      Physician Signature: _________________________        Date: _______________  By signing above or cosigning this note, I have reviewed this plan of care and certify a need to continue medically necessary rehabilitation services.       *PLEASE SIGN ABOVE AND FAX

## 2021-08-23 NOTE — FLOWSHEET NOTE
[] [address]  [address]  [phone]  F: [phone] [] [address]  [facility] [address]   [address]  P: [phone]  F: [phone] [x] [place]  [address]  P: [phone]  F: [phone] [] [address]  P: [phone]  F: [phone] [] [address]  [facility]   [address]   [place]: [phone]  F: [phone]      Physical Therapy Daily Treatment Note    Date:  2021  Patient Name:  Justina Do    :  1970  MRN: [id number]  Physician: Tristian James MD                               Insurance: [facility]  hard max  Medical Diagnosis: Toby Lopez. eoplasm of left breast, ER+, unspecified site of breast  Rehab Codes: C50.912, Z17.0  Onset date: 21 surg      Next Dr's appt.: 21, ongoing   Visit# / total visits:      Cancels/No Shows: 0    Subjective:  Pt reports HEP is going well, can feel pulling in armpit w/ ex. Saw lymphedema this morning. Pain:  [] Yes  [x] No Location: L breast, axilla Pain Rating: (0-10 scale) 0/10  Pain altered Tx:  [x] No  [] Yes  Action:  Comments:    Objective:  Modalities:   Precautions:L BrCa, 1+/3 LN. Lumpectomy 21. No chemo, To begin radiation soon. Then tamoxifen  Manual: Utilized PORi breast protocol to LEFT upper quarter for gentle manual lymphatic drainage, myofascial release and joint mobility to facilitate better function ROM and dynamics of lymph system.   Exercises:  Exercise Reps/ Time Weight/ Level Comments             Diaphragmatic breathing 5 cycles       Bye bye  3x   fwd   Wand flexion  10x       Bye, bye ex 3x   fwd   Elbow winging supine 1m                 Post shld rolls 10x     Scapular retraction         wall slides                               Other: numb axilla and post arm     L shoulder AROM: flex 125 \"pulling in axilla\" before RX, 133° after RX    *cording from inferior incision to mid bicep     Treatment Charges: Mins Units   []  Modalities     [x]  Ther Exercise 10 1   [x]  Manual Therapy 45 3   []  Ther Activities     []  Aquatics     []  Vasocompression     []  Other     Total Treatment time 55        Assessment: [x] Progressing toward goals. Initiated PORI breast protocol to L upper quarter this date. Moderate cording from ~1 inch inferior incision to mid bicep, min TTP. Mod firmness and tissue restriction lateral breast and incision. Incision still healing w/ scabbing and glue in tact. Rev HEP and completed per log w/ emphasis on proper technique as well as not pushing to pain. Pt to cont to monitor for S&S of lymphedema, to obtain a sleeve soon. Will cont 1x weekly through radiation. [] No change. [] Other:  [x] Patient would continue to benefit from skilled physical therapy services in order to: address the following goals    STG: (to be met in 10 treatments)  1. ? Pain: Stabilize L shoulder/chest/axillary pain and ensure optimal management of pain during all ADL's (home, occupation, community and recreation)  2. Optimize AROM of bilateral shoulders for functional activity. 3. Increase strength in L shoulder and scapula to grossly 5/5 for good postural stability. 4. ? Function: Able to sleep, complete basic ADL's, lift/carry and reach OH w/o difficulty  5. Independent with Home Exercise Programs  6. Education on signs and symptoms, precautions regarding lymphedema. -MET        7.   Educated breast care exercises MET     LTG: (to be met in 20 treatments)  1. Maintain /optimize AROM of bilateral shoulders for functional activity to improve QOL. 2.   Decr BFI score by 1+ for improved narda to activity, incr UEFS by 15% for overall increased function  3.    Continue activity to decrease risk factors associated with increased time being sedentary while undergoing chemotherapy, radiation, surgery. 4.   Improve tissue mobility of chest wall and axilla to allow for more normal motion and function  5. Control/mitigate side effects/late effects of Cancer treatment    Pt. Education:  [x] Yes  [] No  [x] Reviewed Prior HEP/Ed  Method of Education: [x] Verbal  [x] Demo  [] Written  Comprehension of Education:  [x] Verbalizes understanding. [] Demonstrates understanding. [] Needs review. [] Demonstrates/verbalizes HEP/Ed previously given. Plan: [x] Continue current frequency toward long and short term goals.     [x] Specific Instructions for subsequent treatments: cont as narda      Time In: 4:00 pm           Time Out: 5:00 pm    Electronically signed by:  Norris Bunch, PT

## 2021-08-25 ENCOUNTER — APPOINTMENT (OUTPATIENT)
Dept: RADIATION ONCOLOGY | Age: 51
End: 2021-08-25
Payer: COMMERCIAL

## 2021-08-27 ENCOUNTER — HOSPITAL ENCOUNTER (OUTPATIENT)
Dept: RADIATION ONCOLOGY | Age: 51
Discharge: HOME | End: 2021-08-27
Attending: STUDENT IN AN ORGANIZED HEALTH CARE EDUCATION/TRAINING PROGRAM
Payer: COMMERCIAL

## 2021-08-27 ENCOUNTER — OFFICE VISIT (OUTPATIENT)
Dept: SURGERY | Age: 51
End: 2021-08-27

## 2021-08-27 ENCOUNTER — HOSPITAL ENCOUNTER (OUTPATIENT)
Age: 51
Discharge: HOME | End: 2021-08-27
Payer: COMMERCIAL

## 2021-08-27 VITALS
BODY MASS INDEX: 21.82 KG/M2 | WEIGHT: 144 LBS | OXYGEN SATURATION: 100 % | RESPIRATION RATE: 12 BRPM | HEART RATE: 84 BPM | HEIGHT: 68 IN

## 2021-08-27 DIAGNOSIS — Z09 POSTOPERATIVE EXAMINATION: Primary | ICD-10-CM

## 2021-08-27 DIAGNOSIS — Z17.0 MALIGNANT NEOPLASM OF UPPER-OUTER QUADRANT OF LEFT BREAST IN FEMALE, ESTROGEN RECEPTOR POSITIVE (HCC): Primary | ICD-10-CM

## 2021-08-27 DIAGNOSIS — C50.412 MALIGNANT NEOPLASM OF UPPER-OUTER QUADRANT OF LEFT BREAST IN FEMALE, ESTROGEN RECEPTOR POSITIVE (HCC): ICD-10-CM

## 2021-08-27 DIAGNOSIS — C50.412 MALIGNANT NEOPLASM OF UPPER-OUTER QUADRANT OF LEFT BREAST IN FEMALE, ESTROGEN RECEPTOR POSITIVE (HCC): Primary | ICD-10-CM

## 2021-08-27 DIAGNOSIS — Z17.0 MALIGNANT NEOPLASM OF UPPER-OUTER QUADRANT OF LEFT BREAST IN FEMALE, ESTROGEN RECEPTOR POSITIVE (HCC): ICD-10-CM

## 2021-08-27 LAB — HCG QUANTITATIVE: <1 IU/L

## 2021-08-27 PROCEDURE — 84702 CHORIONIC GONADOTROPIN TEST: CPT

## 2021-08-27 PROCEDURE — 77263 THER RADIOLOGY TX PLNG CPLX: CPT | Performed by: RADIOLOGY

## 2021-08-27 PROCEDURE — 77334 RADIATION TREATMENT AID(S): CPT | Performed by: RADIOLOGY

## 2021-08-27 PROCEDURE — 99024 POSTOP FOLLOW-UP VISIT: CPT | Performed by: PLASTIC SURGERY

## 2021-08-27 PROCEDURE — 77290 THER RAD SIMULAJ FIELD CPLX: CPT | Performed by: RADIOLOGY

## 2021-08-27 PROCEDURE — 36415 COLL VENOUS BLD VENIPUNCTURE: CPT

## 2021-09-01 ENCOUNTER — TELEPHONE (OUTPATIENT)
Dept: ONCOLOGY | Age: 51
End: 2021-09-01

## 2021-09-01 ENCOUNTER — HOSPITAL ENCOUNTER (OUTPATIENT)
Dept: RADIATION ONCOLOGY | Age: 51
Discharge: HOME | End: 2021-09-01
Attending: STUDENT IN AN ORGANIZED HEALTH CARE EDUCATION/TRAINING PROGRAM
Payer: COMMERCIAL

## 2021-09-01 PROCEDURE — 77295 3-D RADIOTHERAPY PLAN: CPT | Performed by: STUDENT IN AN ORGANIZED HEALTH CARE EDUCATION/TRAINING PROGRAM

## 2021-09-01 PROCEDURE — 77334 RADIATION TREATMENT AID(S): CPT | Performed by: STUDENT IN AN ORGANIZED HEALTH CARE EDUCATION/TRAINING PROGRAM

## 2021-09-01 PROCEDURE — 77300 RADIATION THERAPY DOSE PLAN: CPT | Performed by: STUDENT IN AN ORGANIZED HEALTH CARE EDUCATION/TRAINING PROGRAM

## 2021-09-01 PROCEDURE — 77336 RADIATION PHYSICS CONSULT: CPT | Performed by: RADIOLOGY

## 2021-09-01 NOTE — TELEPHONE ENCOUNTER
[address] Nutrition Note    PGSGA scoring tool reviewed with score <4. Automatic nutrition assessment consult populates from PGSGA tool for scores > 4. Will continue to follow as requested.     CARLOS Knight, RD, LD  Registered Dietitian  Carolyne Mahan  [phone]

## 2021-09-02 ENCOUNTER — HOSPITAL ENCOUNTER (OUTPATIENT)
Dept: PHYSICAL THERAPY | Facility: CLINIC | Age: 51
Setting detail: THERAPIES SERIES
Discharge: HOME | End: 2021-09-02
Payer: COMMERCIAL

## 2021-09-02 PROCEDURE — 97140 MANUAL THERAPY 1/> REGIONS: CPT

## 2021-09-02 PROCEDURE — 97110 THERAPEUTIC EXERCISES: CPT

## 2021-09-02 NOTE — FLOWSHEET NOTE
[] [address]  [address]  [phone]  F: [phone] [] [address]  [facility]   [address]  P: [phone]  F: [phone] [x] [place]  [address]  P: [phone]  F: [phone] [] [address]  P: [phone]  F: [phone] [] [address]  [facility]   [address]   [place]: [phone]  F: [phone]      Physical Therapy Daily Treatment Note    Date:  2021  Patient Name:  Ronald Maldonado    :  1970  MRN: [id number]  Physician: Naaman Duane, MD                               Insurance: [employer]  hard max  Medical Diagnosis: Parker Walton. eoplasm of left breast, ER+, unspecified site of breast  Rehab Codes: C50.912, Z17.0  Onset date: 21 surg      Next Dr's appt.: 21, ongoing   Visit# / total visits: 3/20     Cancels/No Shows: 0    Subjective:  Pt reports intermittent stinging, occ numbness post L arm. HEP is going well, less pulling, feels loosening up. Did obtain her sleeve but unsure how/when to wear it. Radiation to begin next week? Pain:  [] Yes  [x] No Location: L breast, axilla Pain Rating: (0-10 scale) 0/10  Pain altered Tx:  [x] No  [] Yes  Action:  Comments:    Objective:  Modalities:   Precautions:L BrCa, 1+/3 LN. Lumpectomy 21. No chemo, To begin radiation soon. Then tamoxifen  Manual: Utilized PORi breast protocol to LEFT upper quarter for gentle manual lymphatic drainage, myofascial release and joint mobility to facilitate better function ROM and dynamics of lymph system.   Exercises:  Exercise Reps/ Time Weight/ Level Comments             Diaphragmatic breathing 5 cycles       Bye bye  3x   fwd   Wand flexion  10x  HEP     Bye, bye ex 3x   Fwd, abd Elbow winging supine 1m                 Post shld rolls 10x     Scapular retraction  10x       wall slides  10x    flex              pec stretch 3x30     door , varied arm position         SL ABD              Other: numb axilla and post arm     L shoulder AROM: flex 142° \"pulling in axilla\" before RX, 150° after RX    *cording from inferior incision to mid bicep     Treatment Charges: Mins Units   []  Modalities     [x]  Ther Exercise 10 1   [x]  Manual Therapy 45 3   []  Ther Activities     []  Aquatics     []  Vasocompression     []  Other     Total Treatment time 55        Assessment: [x] Progressing toward goals. PORI breast protocol to L upper quarter this date. Moderate cording from ~1 inch inferior incision to mid bicep, min TTP. Mod firmness and tissue restriction lateral breast, incision and upper post arm. Incision still healing w/ scabbing, glue is gone. Rev HEP and completed per log w/ added bye bye abd, scap retraction, wall slides and pec stretch at door. Issued HO for HEP. Emphasized proper technique as well as not pushing to pain w/ all ex and postural awareness. Discussed wearing schedule for sleeve, pt w/ good understanding. Pt to cont to monitor for S&S of lymphedema. Will cont 1x weekly through radiation. [] No change. [] Other:  [x] Patient would continue to benefit from skilled physical therapy services in order to: address the following goals    STG: (to be met in 10 treatments)  1. ? Pain: Stabilize L shoulder/chest/axillary pain and ensure optimal management of pain during all ADL's (home, occupation, community and recreation)  2. Optimize AROM of bilateral shoulders for functional activity. 3. Increase strength in L shoulder and scapula to grossly 5/5 for good postural stability. 4. ? Function: Able to sleep, complete basic ADL's, lift/carry and reach OH w/o difficulty  5. Independent with Home Exercise Programs  6.    Education on signs and symptoms, precautions regarding lymphedema. -MET        7.   Educated breast care exercises MET     LTG: (to be met in 20 treatments)  1. Maintain /optimize AROM of bilateral shoulders for functional activity to improve QOL. 2.   Decr BFI score by 1+ for improved narda to activity, incr UEFS by 15% for overall increased function  3. Continue activity to decrease risk factors associated with increased time being sedentary while undergoing chemotherapy, radiation, surgery. 4.   Improve tissue mobility of chest wall and axilla to allow for more normal motion and function  5. Control/mitigate side effects/late effects of Cancer treatment    Pt. Education:  [x] Yes  [] No  [x] Reviewed Prior HEP/Ed  Method of Education: [x] Verbal  [x] Demo  [] Written  Comprehension of Education:  [x] Verbalizes understanding. [] Demonstrates understanding. [] Needs review. [] Demonstrates/verbalizes HEP/Ed previously given. Plan: [x] Continue current frequency toward long and short term goals.     [x] Specific Instructions for subsequent treatments: cont as narda      Time In: 11:00 am           Time Out: 12:00 pm    Electronically signed by:  Jr Miranda, PT

## 2021-09-09 ENCOUNTER — HOSPITAL ENCOUNTER (OUTPATIENT)
Dept: RADIATION ONCOLOGY | Age: 51
Discharge: HOME | End: 2021-09-09
Attending: RADIOLOGY
Payer: COMMERCIAL

## 2021-09-09 PROCEDURE — 77280 THER RAD SIMULAJ FIELD SMPL: CPT | Performed by: RADIOLOGY

## 2021-09-09 PROCEDURE — 77412 RADIATION TX DELIVERY LVL 3: CPT | Performed by: RADIOLOGY

## 2021-09-10 ENCOUNTER — HOSPITAL ENCOUNTER (OUTPATIENT)
Dept: RADIATION ONCOLOGY | Age: 51
Discharge: HOME | End: 2021-09-10
Attending: RADIOLOGY
Payer: COMMERCIAL

## 2021-09-10 PROCEDURE — 77387 GUIDANCE FOR RADJ TX DLVR: CPT | Performed by: RADIOLOGY

## 2021-09-10 PROCEDURE — 77412 RADIATION TX DELIVERY LVL 3: CPT | Performed by: RADIOLOGY

## 2021-09-10 PROCEDURE — G6002 STEREOSCOPIC X-RAY GUIDANCE: HCPCS | Performed by: RADIOLOGY

## 2021-09-13 ENCOUNTER — HOSPITAL ENCOUNTER (OUTPATIENT)
Dept: RADIATION ONCOLOGY | Age: 51
Discharge: HOME | End: 2021-09-13
Attending: RADIOLOGY
Payer: COMMERCIAL

## 2021-09-13 PROCEDURE — G6002 STEREOSCOPIC X-RAY GUIDANCE: HCPCS | Performed by: RADIOLOGY

## 2021-09-13 PROCEDURE — 77412 RADIATION TX DELIVERY LVL 3: CPT | Performed by: RADIOLOGY

## 2021-09-13 PROCEDURE — 77387 GUIDANCE FOR RADJ TX DLVR: CPT | Performed by: RADIOLOGY

## 2021-09-14 ENCOUNTER — HOSPITAL ENCOUNTER (OUTPATIENT)
Dept: PHYSICAL THERAPY | Facility: CLINIC | Age: 51
Setting detail: THERAPIES SERIES
End: 2021-09-14
Payer: COMMERCIAL

## 2021-09-14 ENCOUNTER — HOSPITAL ENCOUNTER (OUTPATIENT)
Dept: RADIATION ONCOLOGY | Age: 51
Discharge: HOME | End: 2021-09-14
Attending: RADIOLOGY
Payer: COMMERCIAL

## 2021-09-14 PROCEDURE — 77387 GUIDANCE FOR RADJ TX DLVR: CPT | Performed by: RADIOLOGY

## 2021-09-14 PROCEDURE — G6002 STEREOSCOPIC X-RAY GUIDANCE: HCPCS | Performed by: RADIOLOGY

## 2021-09-14 PROCEDURE — 77412 RADIATION TX DELIVERY LVL 3: CPT | Performed by: RADIOLOGY

## 2021-09-15 ENCOUNTER — HOSPITAL ENCOUNTER (OUTPATIENT)
Dept: RADIATION ONCOLOGY | Age: 51
Discharge: HOME | End: 2021-09-15
Attending: RADIOLOGY
Payer: COMMERCIAL

## 2021-09-15 ENCOUNTER — HOSPITAL ENCOUNTER (OUTPATIENT)
Dept: RADIATION ONCOLOGY | Age: 51
Discharge: HOME | End: 2021-09-15
Attending: STUDENT IN AN ORGANIZED HEALTH CARE EDUCATION/TRAINING PROGRAM
Payer: COMMERCIAL

## 2021-09-15 VITALS
SYSTOLIC BLOOD PRESSURE: 117 MMHG | RESPIRATION RATE: 14 BRPM | OXYGEN SATURATION: 97 % | HEART RATE: 58 BPM | WEIGHT: 143 LBS | BODY MASS INDEX: 21.74 KG/M2 | DIASTOLIC BLOOD PRESSURE: 77 MMHG | TEMPERATURE: 98.1 F

## 2021-09-15 PROCEDURE — 77387 GUIDANCE FOR RADJ TX DLVR: CPT | Performed by: RADIOLOGY

## 2021-09-15 PROCEDURE — G6002 STEREOSCOPIC X-RAY GUIDANCE: HCPCS | Performed by: RADIOLOGY

## 2021-09-15 PROCEDURE — 77336 RADIATION PHYSICS CONSULT: CPT | Performed by: RADIOLOGY

## 2021-09-15 PROCEDURE — 77427 RADIATION TX MANAGEMENT X5: CPT | Performed by: RADIOLOGY

## 2021-09-15 PROCEDURE — 77412 RADIATION TX DELIVERY LVL 3: CPT | Performed by: RADIOLOGY

## 2021-09-15 NOTE — PROGRESS NOTES
[place] [address]       Radiation Oncology          [address] [facility] [address]        Shala Soto: [phone]        F: [phone]       [facility] [address] [facility]       Radiation Oncology   [address], [address] [facility]       Shala Soto: [phone]       F: [phone]       [facility]          RADIATION ONCOLOGY WEEKLY PROGRESS NOTE  Patient ID:   Nabeel Hein  : 1970   MRN: [id number]    DIAGNOSIS:  Cancer Staging  Malignant neoplasm of upper-outer quadrant of left breast in female, estrogen receptor positive ([facility] [address]  Staging form: Breast, AJCC 8th Edition  - Clinical stage from 6/3/2021: Stage IA (cT1c, cN0, cM0, G2, ER+, IN+, HER2-) - Signed by Garrett Vallecillo MD on 2021  - Pathologic stage from 2021: Stage IA (pT1c, pN1a(sn), cM0, G2, ER+, IN+, HER2-) - Signed by Garrett Vallecillo MD on 2021      TREATMENT DETAILS:  Treatment Site: L Breast and axilla  Actual Dose: 1330cGy  Total Planned Dose: 4256cGy  Treatment Technique: 3D-CRT  Fraction Technique: Daily  Therapy imaging monitoring: CBCT daily  Concurrent Chemotherapy: NA    SUBJECTIVE:   Patient seen for their weekly on treatment evaluation today. Patient is doing well with no significant complaints. She denies any skin irritation, swelling, itching, or redness. She denies any trouble with range of motion. She denies any fatigue or changes in appetite. She does still have some numbness in her axilla which is stable.     OBJECTIVE:   CHAPERONE: Not Required    ECO Asymptomatic    VITAL SIGNS: /77   Pulse 58   Temp 98.1 °F (36.7 °C)   Resp 14   Wt 143 lb (64.9 kg)   SpO2 97%   BMI 21.74 kg/m²   Wt Readings from Last 5 Encounters:   09/15/21 143 lb (64.9 kg)   21 144 lb (65.3 kg)   21 144 lb 6.4 oz (65.5 kg)   21 144 lb (65.3 kg)   21 143 lb (64.9 kg)     GENERAL:  General appearance is that of a well-nourished, well-developed in no apparent distress. HEENT: Normocephalic, atraumatic, EOMI, moist mucosa, no erythema. EXTREMITIES:  No edema. No calf tenderness. PSYCH: Mood normal, behavior normal.  SKIN: No erythema, no desquamation. LABS:  WBC   Date Value Ref Range Status   06/18/2021 7.4 3.5 - 11.0 k/uL Final     Segs Absolute   Date Value Ref Range Status   06/18/2021 4.90 1.8 - 7.7 k/uL Final     Hemoglobin   Date Value Ref Range Status   06/18/2021 15.1 12.0 - 16.0 g/dL Final     Platelets   Date Value Ref Range Status   06/18/2021 253 140 - 450 k/uL Final     CREATININE   Date Value Ref Range Status   06/18/2021 0.79 0.50 - 0.90 mg/dL Final     No results found for: , CEA  No results found for: PSA    MEDICATIONS:    Current Outpatient Medications:     Multiple Vitamins-Minerals (THERAPEUTIC MULTIVITAMIN-MINERALS) tablet, Take 1 tablet by mouth daily, Disp: , Rfl:     clobetasol (TEMOVATE) 0.05 % ointment, Apply topically 2 times daily. , Disp: 45 g, Rfl: 0      ASSESSMENT PLAN:   Treatment setup and plan reviewed. Port images/CBCT images reviewed. Appropriate laboratory work was reviewed. Treatment side effects and toxicities reviewed with the patient, and appropriate management was advised. Will continue radiation treatment as planned, and recommend patient contact us if they have any questions or concerns. Electronically signed by Yossi Mercado MD on 9/15/2021 at 8:54 AM      Drugs Prescribed:  New Prescriptions    No medications on file       Other Orders Placed:  No orders of the defined types were placed in this encounter.

## 2021-09-15 NOTE — PROGRESS NOTES
Mark Pascal  9/15/2021  Wt Readings from Last 3 Encounters:   09/15/21 143 lb (64.9 kg)   08/27/21 144 lb (65.3 kg)   08/18/21 144 lb 6.4 oz (65.5 kg)     Body mass index is 21.74 kg/m². Treatment Area L breast     Patient was seen today for weekly visit. Comfort Alteration    Fatigue: Mild    Nutritional Alteration  Anorexia: No   Nausea: No   Vomiting: No     Mucous Membrane Alteration  Drainage: No  Lymphedema: No, wears sleeve     Skin Alteration   Sensation:intact     Radiation Dermatitis:  Intact [x]     Erythema  []     Discoloration  []     Rash []     Dry desquamation  []     Moist desquamation []       Emotional  Coping: effective      Injury, potential bleeding or infection: skin care reviewed     Lab Results   Component Value Date    WBC 7.4 06/18/2021     06/18/2021         /77   Pulse 58   Temp 98.1 °F (36.7 °C)   Resp 14   Wt 143 lb (64.9 kg)   SpO2 97%   BMI 21.74 kg/m²   Patient Currently in Pain: Denies                 Assessment/Plan: Patient was seen today for weekly visit.  Dr Monico Nageotte notified mad examined pt       Delfin Hansen RN

## 2021-09-16 ENCOUNTER — HOSPITAL ENCOUNTER (OUTPATIENT)
Dept: PHYSICAL THERAPY | Facility: CLINIC | Age: 51
Setting detail: THERAPIES SERIES
Discharge: HOME | End: 2021-09-16
Payer: COMMERCIAL

## 2021-09-16 ENCOUNTER — HOSPITAL ENCOUNTER (OUTPATIENT)
Dept: RADIATION ONCOLOGY | Age: 51
Discharge: HOME | End: 2021-09-16
Attending: RADIOLOGY
Payer: COMMERCIAL

## 2021-09-16 PROCEDURE — 97140 MANUAL THERAPY 1/> REGIONS: CPT

## 2021-09-16 PROCEDURE — 97110 THERAPEUTIC EXERCISES: CPT

## 2021-09-16 PROCEDURE — 77412 RADIATION TX DELIVERY LVL 3: CPT | Performed by: RADIOLOGY

## 2021-09-16 PROCEDURE — 77417 THER RADIOLOGY PORT IMAGE(S): CPT | Performed by: RADIOLOGY

## 2021-09-16 NOTE — FLOWSHEET NOTE
[] [address]  [address]  [phone]  F: [phone] [] [address]  [facility]   [address]  P: [phone]  F: [phone] [x] [place]  [address]  P: [phone]  F: [phone] [] [address]  P: [phone]  F: [phone] [] [address]  [facility]   [address]   [place]: [phone]  F: [phone]      Physical Therapy Daily Treatment Note    Date:  2021  Patient Name:  Sandeep Plata    :  1970  MRN: [id number]  Physician: Ana Maria Hall MD                               Insurance: [id number]  hard max  Medical Diagnosis: Tosinyogesh Harris. eoplasm of left breast, ER+, unspecified site of breast  Rehab Codes: C50.912, Z17.0  Onset date: 21 surg      Next Dr's appt.: 21, ongoing   Visit# / total visits:      Cancels/No Shows: 0    Subjective:  Pt coming from radiation this morning, states she has had 6/16 treatments and narda well so far. Cont's w/ intermittent numbness and occ burning under arm/axilla. Wearing sleeve daily, getting used to it. HEP is going well, no issues. Pain:  [] Yes  [x] No Location: L breast, axilla Pain Rating: (0-10 scale) 0/10  Pain altered Tx:  [x] No  [] Yes  Action:  Comments:    Objective:  Modalities:   Precautions:L BrCa, 1+/3 LN. Lumpectomy 21. No chemo, radiation 16 treatments -? Then tamoxifen  Manual: Utilized PORi breast protocol to LEFT upper quarter for gentle manual lymphatic drainage, myofascial release and joint mobility to facilitate better function ROM and dynamics of lymph system.   Exercises:  Exercise Reps/ Time Weight/ Level Comments             Diaphragmatic breathing 5 cycles       Bye bye  3x   fwd   Wand flexion  10x  HEP     Bye, bye ex 3x   Fwd, abd   Elbow winging supine 1m                 Post shld rolls 10x     Scapular retraction  10x       wall slides  10x    flex              pec stretch 3x30     door , varied arm position         SL ABD 10x             Other: numb axilla and post arm     L shoulder AROM: flex 150° \"pulling in axilla\" before RX, 156° after RX    *cording from inferior incision to mid bicep     Treatment Charges: Mins Units   []  Modalities     [x]  Ther Exercise 10 1   [x]  Manual Therapy 45 3   []  Ther Activities     []  Aquatics     []  Vasocompression     []  Other     Total Treatment time 55        Assessment: [x] Progressing toward goals. PORI breast protocol to L upper quarter this date. Cont's w/ moderate cording from ~1 inch inferior incision to mid bicep, less TTP, less prominent. Mod firmness and tissue restriction lateral breast, incisions and upper post arm. Incision healed well. Mod muscular tightness pec and subscap M's. Rev HEP and completed per log w/ added SL scab abd. Emphasized proper technique as well as not pushing to pain w/ all ex and postural awareness. Rev wearing schedule for sleeve, pt w/ good understanding. Pt to cont to monitor for S&S of lymphedema. Emphasized importance of hydration and staying moisturized. Will cont 1x weekly through radiation. [] No change. [] Other:  [x] Patient would continue to benefit from skilled physical therapy services in order to: address the following goals    STG: (to be met in 10 treatments)  1. ? Pain: Stabilize L shoulder/chest/axillary pain and ensure optimal management of pain during all ADL's (home, occupation, community and recreation)  2. Optimize AROM of bilateral shoulders for functional activity. 3. Increase strength in L shoulder and scapula to grossly 5/5 for good postural stability. 4. ? Function: Able to sleep, complete basic ADL's, lift/carry and reach OH w/o difficulty  5.  Independent with Home Exercise

## 2021-09-17 ENCOUNTER — HOSPITAL ENCOUNTER (OUTPATIENT)
Dept: RADIATION ONCOLOGY | Age: 51
Discharge: HOME | End: 2021-09-17
Attending: RADIOLOGY
Payer: COMMERCIAL

## 2021-09-17 PROCEDURE — G6002 STEREOSCOPIC X-RAY GUIDANCE: HCPCS | Performed by: RADIOLOGY

## 2021-09-17 PROCEDURE — 77412 RADIATION TX DELIVERY LVL 3: CPT | Performed by: RADIOLOGY

## 2021-09-17 PROCEDURE — 77387 GUIDANCE FOR RADJ TX DLVR: CPT | Performed by: RADIOLOGY

## 2021-09-20 ENCOUNTER — HOSPITAL ENCOUNTER (OUTPATIENT)
Dept: RADIATION ONCOLOGY | Age: 51
Discharge: HOME | End: 2021-09-20
Attending: RADIOLOGY
Payer: COMMERCIAL

## 2021-09-20 PROCEDURE — 77412 RADIATION TX DELIVERY LVL 3: CPT | Performed by: RADIOLOGY

## 2021-09-20 PROCEDURE — G6002 STEREOSCOPIC X-RAY GUIDANCE: HCPCS | Performed by: RADIOLOGY

## 2021-09-20 PROCEDURE — 77387 GUIDANCE FOR RADJ TX DLVR: CPT | Performed by: RADIOLOGY

## 2021-09-21 ENCOUNTER — HOSPITAL ENCOUNTER (OUTPATIENT)
Dept: RADIATION ONCOLOGY | Age: 51
Discharge: HOME | End: 2021-09-21
Attending: RADIOLOGY
Payer: COMMERCIAL

## 2021-09-21 PROCEDURE — 77412 RADIATION TX DELIVERY LVL 3: CPT | Performed by: RADIOLOGY

## 2021-09-21 PROCEDURE — G6002 STEREOSCOPIC X-RAY GUIDANCE: HCPCS | Performed by: RADIOLOGY

## 2021-09-21 PROCEDURE — 77387 GUIDANCE FOR RADJ TX DLVR: CPT | Performed by: RADIOLOGY

## 2021-09-22 ENCOUNTER — HOSPITAL ENCOUNTER (OUTPATIENT)
Dept: RADIATION ONCOLOGY | Age: 51
Discharge: HOME | End: 2021-09-22
Attending: RADIOLOGY
Payer: COMMERCIAL

## 2021-09-22 ENCOUNTER — HOSPITAL ENCOUNTER (OUTPATIENT)
Dept: RADIATION ONCOLOGY | Age: 51
Discharge: HOME | End: 2021-09-22
Attending: STUDENT IN AN ORGANIZED HEALTH CARE EDUCATION/TRAINING PROGRAM
Payer: COMMERCIAL

## 2021-09-22 VITALS
WEIGHT: 142 LBS | BODY MASS INDEX: 21.59 KG/M2 | HEART RATE: 64 BPM | DIASTOLIC BLOOD PRESSURE: 84 MMHG | OXYGEN SATURATION: 98 % | TEMPERATURE: 97.8 F | RESPIRATION RATE: 16 BRPM | SYSTOLIC BLOOD PRESSURE: 126 MMHG

## 2021-09-22 PROCEDURE — 77387 GUIDANCE FOR RADJ TX DLVR: CPT | Performed by: RADIOLOGY

## 2021-09-22 PROCEDURE — G6002 STEREOSCOPIC X-RAY GUIDANCE: HCPCS | Performed by: RADIOLOGY

## 2021-09-22 PROCEDURE — 77412 RADIATION TX DELIVERY LVL 3: CPT | Performed by: RADIOLOGY

## 2021-09-22 PROCEDURE — 77427 RADIATION TX MANAGEMENT X5: CPT | Performed by: RADIOLOGY

## 2021-09-22 NOTE — PROGRESS NOTES
Allegra Monday 9/22/2021  Wt Readings from Last 3 Encounters:   09/22/21 142 lb (64.4 kg)   09/15/21 143 lb (64.9 kg)   08/27/21 144 lb (65.3 kg)     Body mass index is 21.59 kg/m². Treatment Area:L breast     Patient was seen today for weekly visit. Comfort Alteration    Fatigue: None    Nutritional Alteration  Anorexia: No   Nausea: No   Vomiting: No     Mucous Membrane Alteration  Drainage: No  Lymphedema: Yes    Skin Alteration   Sensation:intact     Radiation Dermatitis:  Intact [x]     Erythema  []     Discoloration  []     Rash []     Dry desquamation  []     Moist desquamation []       Emotional  Coping: effective      Injury, potential bleeding or infection: skin care reveiwed     Lab Results   Component Value Date    WBC 7.4 06/18/2021     06/18/2021         /84   Pulse 64   Temp 97.8 °F (36.6 °C)   Resp 16   Wt 142 lb (64.4 kg)   SpO2 98%   BMI 21.59 kg/m²   Patient Currently in Pain: Denies                 Assessment/Plan: Patient was seen today for weekly visit. Dr Tania Rivera notified and examined pt.        Kamila Story, RN

## 2021-09-22 NOTE — PROGRESS NOTES
[facility]       Radiation Oncology          [address] [facility] [address]        Verl Simper: [phone]        F: [phone]       [facility] [address] [facility]       Radiation Oncology   [address], [address] [facility]       Ver Simper: [phone]       F: [phone]       [email or web address]          RADIATION ONCOLOGY WEEKLY PROGRESS NOTE  Patient ID:   Jose Miguel rCum  : 1970   MRN: [id number]    DIAGNOSIS:  Cancer Staging  Malignant neoplasm of upper-outer quadrant of left breast in female, estrogen receptor positive ([facility] [address]  Staging form: Breast, AJCC 8th Edition  - Clinical stage from 6/3/2021: Stage IA (cT1c, cN0, cM0, G2, ER+, OK+, HER2-) - Signed by Yossi Mercado MD on 2021  - Pathologic stage from 2021: Stage IA (pT1c, pN1a(sn), cM0, G2, ER+, OK+, HER2-) - Signed by Yossi Mercado MD on 2021      TREATMENT DETAILS:  Treatment Site: L Breast and axilla  Actual Dose: 75572jAf  Total Planned Dose: 4256cGy  Treatment Technique: 3D-CRT  Fraction Technique: Daily  Therapy imaging monitoring: CBCT daily  Concurrent Chemotherapy: NA    SUBJECTIVE:   Patient seen for their weekly on treatment evaluation today. Patient is doing well with no significant complaints. She denies any skin irritation, swelling, itching, or redness. She notes her skin is slightly darkened. She denies any trouble with range of motion. She denies any fatigue or changes in appetite. She does still have some numbness in her axilla which is stable. She notes that she is having some hot flashes at night but attributes it to menopause.     OBJECTIVE:   CHAPERONE: Not Required    ECO Asymptomatic    VITAL SIGNS: /84   Pulse 64   Temp 97.8 °F (36.6 °C)   Resp 16   Wt 142 lb (64.4 kg)   SpO2 98%   BMI 21.59 kg/m²   Wt Readings from Last 5 Encounters:   21 142 lb (64.4 kg)   09/15/21 143 lb (64.9 kg) 08/27/21 144 lb (65.3 kg)   08/18/21 144 lb 6.4 oz (65.5 kg)   08/18/21 144 lb (65.3 kg)     GENERAL:  General appearance is that of a well-nourished, well-developed in no apparent distress. HEENT: Normocephalic, atraumatic, EOMI, moist mucosa, no erythema. EXTREMITIES:  No edema. No calf tenderness. PSYCH: Mood normal, behavior normal.  SKIN: No erythema, no desquamation. LABS:  WBC   Date Value Ref Range Status   06/18/2021 7.4 3.5 - 11.0 k/uL Final     Segs Absolute   Date Value Ref Range Status   06/18/2021 4.90 1.8 - 7.7 k/uL Final     Hemoglobin   Date Value Ref Range Status   06/18/2021 15.1 12.0 - 16.0 g/dL Final     Platelets   Date Value Ref Range Status   06/18/2021 253 140 - 450 k/uL Final     CREATININE   Date Value Ref Range Status   06/18/2021 0.79 0.50 - 0.90 mg/dL Final     No results found for: , CEA  No results found for: PSA    MEDICATIONS:    Current Outpatient Medications:     Multiple Vitamins-Minerals (THERAPEUTIC MULTIVITAMIN-MINERALS) tablet, Take 1 tablet by mouth daily, Disp: , Rfl:     clobetasol (TEMOVATE) 0.05 % ointment, Apply topically 2 times daily. , Disp: 45 g, Rfl: 0      ASSESSMENT PLAN:   Treatment setup and plan reviewed. Port images/CBCT images reviewed. Appropriate laboratory work was reviewed. Treatment side effects and toxicities reviewed with the patient, and appropriate management was advised. Will continue radiation treatment as planned, and recommend patient contact us if they have any questions or concerns. Recommend patient use black cohosh or evening primrose to help with symptoms and continue to monitor. Electronically signed by Ida Horta MD on 9/22/2021 at 8:43 AM      Drugs Prescribed:  New Prescriptions    No medications on file       Other Orders Placed:  No orders of the defined types were placed in this encounter.

## 2021-09-23 ENCOUNTER — HOSPITAL ENCOUNTER (OUTPATIENT)
Dept: PHYSICAL THERAPY | Facility: CLINIC | Age: 51
Setting detail: THERAPIES SERIES
Discharge: HOME | End: 2021-09-23
Payer: COMMERCIAL

## 2021-09-23 ENCOUNTER — HOSPITAL ENCOUNTER (OUTPATIENT)
Dept: RADIATION ONCOLOGY | Age: 51
Discharge: HOME | End: 2021-09-23
Attending: RADIOLOGY
Payer: COMMERCIAL

## 2021-09-23 ENCOUNTER — HOSPITAL ENCOUNTER (OUTPATIENT)
Dept: OCCUPATIONAL THERAPY | Age: 51
Setting detail: THERAPIES SERIES
Discharge: HOME | End: 2021-09-23
Payer: COMMERCIAL

## 2021-09-23 PROCEDURE — 97110 THERAPEUTIC EXERCISES: CPT

## 2021-09-23 PROCEDURE — 77412 RADIATION TX DELIVERY LVL 3: CPT | Performed by: RADIOLOGY

## 2021-09-23 PROCEDURE — 97140 MANUAL THERAPY 1/> REGIONS: CPT

## 2021-09-23 PROCEDURE — G6002 STEREOSCOPIC X-RAY GUIDANCE: HCPCS | Performed by: RADIOLOGY

## 2021-09-23 PROCEDURE — 77387 GUIDANCE FOR RADJ TX DLVR: CPT | Performed by: RADIOLOGY

## 2021-09-23 PROCEDURE — 97535 SELF CARE MNGMENT TRAINING: CPT

## 2021-09-23 NOTE — FLOWSHEET NOTE
TREATMENT LOCATION:   [] [facility]: [phone]  F: [phone] [x] [address]: [phone]  F: [phone]        Lymphedema Services - Treatment Note of the Upper Extremity    Date:  2021  Patient: Jazmin Mckeon  : 1970             MRN: [id number]  Referring Physician: Tonja Gill MD       Phone: [phone]  Fax: Insurance: [facility] (RB:[id number] ) -  visits (hard max)  Medical Diagnosis: C50.412, Z17.0  Rehab Codes: I89.0     Onset Date: 21  Visit# / total visits: 2/2 scheduled; Progress note for Medicare patient due at visit 10   ( Certification Dates: 2021 - 21)    Contraindications/Precautions:   None      Subjective: Pt is 11 radiation treatments in and it's going well. PT also going well. Has been wearing sleeve daily. She was able to start wearing her sleeve daily at start of radiation. Pain: [] YES    [x] NO     Location: n/a      Pain Rating: ( 0-10 scale) : 0/10  Comments:     Plan for next session:  Review/update POC as needed, discharge if able        Objective:   [x] Measurement [] Bandaging [] MLD [] Skin care   [x] Education [] Self-bandaging [] Self-MLD [] Wound Care   [] Exercise [] Caregiver training [] Kinesiotaping [] Other:    [] Nutrition [] Garment fitting/training [] Vasopneumatic Pump           Circumferential Measurements   Measurements taken from nail base of D3 digit. Fingers are measured at the base. Measurements (cm) Right Left   Dorsum  11 18.7 17.7   Wrist  18 15.4 15.4   Lower Forearm 23 15.4 15.1   Upper Forearm  37 23.6 22.8   Olecranon  44 24.4 23.8   Lower Bicep  51 26.9 26.0   Upper Bicep  61 29.9 29.0   Current total: 21    Initial Total 2021:   154.3    153.3 149.8    150.4              Assessment:  [x] Progressing toward goals. [] No change.                           [] Other:  [x] Patient would continue to benefit from skilled occupational therapy services in order to increase independence with long term management of condition. Pt arrives with compression sleeve donned to MARITZA. She has been wearing as instructed. Sleeve wearing protocol and signs/symptoms of swelling reviewed at length. Pt indicating possible fullness to L lateral chest, recommending compression bra at this time. No overt signs of swelling overall. Educated pt on risk reduction techniques utilizing handout below. Pt to return within 90 days with any further needs. Lymphedema Risk Reduction Techniques    People who have had surgery or trauma to the lymphatic system should be aware of activities that put too much pressure on the affected arm or leg. Protective measures to avoid injury, swelling, and infection include:    Maintaining Proper Hygiene   Clean the skin of the affected Arm daily and apply lotion.  Take proper care of the fingernails and avoid cutting cuticles too short.  Clean all cuts with soap and water, and then apply antibacterial ointment and a sterile dressing.  Use unscented lotion daily to keep the skin moisturized. Staying Fit   Complete lymphedema exercises regularly to improve drainage as instructed by therapist.  Ardyth Moritz Gradually increase intensity of activity/task.  Maintain a healthy body weight. Eat a well-balanced, low-sodium diet.  Keep the Arm elevated when possible if you are having swelling.  Do light exercises or stretching. Taking Precaution with Everyday Activities    Protect your fingers from needle pricks and sharp objects.  Avoid vigorous, repetitive arm movements against resistance, such as scrubbing, pulling or pushing, with the affected arm if you are not wearing compression.  Wear compression when traveling by airplane.  Avoid sunburns and other burns to the affected area.    Use an electric shaver when shaving underarms to prevent cuts leading to infection.  No new tattoos in the affected area.  Avoid extreme heat situations such as hot tub or sauna. (Heat can prompt swelling.)    Wearing the Right Attire   Wear gloves when gardening or when using strong household detergents.  Do not wear clothing with tight straps/elastic cuffs over affected extremity.  Carry your handbag or heavy packages in the unaffected arm.  Compression garments for lymphedema need to fit correctly. An ill-fitting compression garment may make lymphedema worse. Speaking Up at [address] Make sure that all injections are given, and blood tests are drawn in the unaffected arm.  DO NOT have your blood pressure taken on affected arm.  Avoid extreme hot or cold temperatures on the affected area, such as heating pads or ice packs.  Notify your doctor immediately of any signs of infection, such as redness, pain, heat, increased swelling or fever. Therapy Goals:   Short Term Goals to be met within 2 visits  Pt will demonstrate compliance of maintaining lymphedema precautions to reduce the risks of progression of lymphedema beyond stage 0/pre-symptomatic stage. Met 9/23   Pt will be able to verbalize early signs/symptoms of lymphedema in order to effectively prevent onset of symptomatic lymphedema to LUQ. Met 9/23     Pt will demo understanding of preventative compression sleeve wearing schedule along with independent donning/doffing to prevent onset of symptomatic lymphedema. Met 9/23     Long Term Goals to be met within 4 visits     Pt will be monitored for progression into lymphedema stages 1 and beyond as evidenced by measurable change in swelling, observable skin changes, and/or subjective pt reports at which time goals will be updated accordingly to reflect need for decongestive therapy. Ongoing 9/23           Pt.  Education:  [x] Yes  [] No  [x] Reviewed Prior HEP/Ed  Method of Education: [x] Verbal  [x] Demo  [x] Written  Comprehension of Education:  [x] Verbalizes understanding. [x] Demonstrates understanding. [] Needs review. [] No understanding  Knowledge of home program:  [x] Good [] Fair [] Poor [] With assist from family/caregiver        Plan:   [x] Continue current frequency toward long and short term goals.           Treatment Charges Minutes   Units   Re-evaluation (13885):$   64.01/$49.52                                           Manual Therapy (60678):             $26.92 / $21.34     Therapeutic activities (07643):   $37.46/ $26.79     Therapeutic Exercise (18026):   $29.21/$ 22.84     Self care/home mgmt (88363):    $32.39 / $24.43 30 2   Vasopneumatic Device (49650):   $9.21     Total Treatment Time    30 2       Time In:  8837  Time Out: 1600      Electronically signed by Colletta Kingfisher, OT on 9/23/2021 at 3:35 PM

## 2021-09-23 NOTE — FLOWSHEET NOTE
[] [facility]  [address]  [phone]  F: [phone] [] [address]  [facility]   [address]  P: [phone]  F: [phone] [x] [place]  [address]  P: [phone]  F: [phone] [] [address]  P: [phone]  F: [phone] [] [address]  [facility]   [address]   [place]: [phone]  F: [phone]      Physical Therapy Daily Treatment Note    Date:  2021  Patient Name:  Ha Chew    :  1970  MRN: [id number]  Physician: Cristi Amanda MD                               Insurance: [facility]  hard max  Medical Diagnosis: [employer]. eoplasm of left breast, ER+, unspecified site of breast  Rehab Codes: C50.912, Z17.0  Onset date: 21 surg      Next Dr's appt.: 21, ongoing   Visit# / total visits:      Cancels/No Shows: 0    Subjective:  Pt conts w/ intmetmittent stinging/burning under arm/axilla and occ in breast but notes tolerable. States measurements from lymphedema were good and wearing sleeve regularly. Pain:  [x] Yes  [] No Location: L breast, axilla Pain Rating: (0-10 scale) 1/10  Pain altered Tx:  [x] No  [] Yes  Action:  Comments:    Objective:  Modalities:   Precautions:L BrCa, 1+/3 LN. Lumpectomy 21. No chemo, radiation 16 treatments -? Then tamoxifen  Manual: Utilized PORi breast protocol to LEFT upper quarter for gentle manual lymphatic drainage, myofascial release and joint mobility to facilitate better function ROM and dynamics of lymph system.   Exercises: Phase 1  Exercise Reps/ Time Weight/ Level Comments             Diaphragmatic breathing 5 cycles       Bye bye  3x   fwd   Wand flexion  10x  HEP     Bye, bye ex 3x   Fwd, abd Elbow winging supine 1m                 Post shld rolls 10x     Scapular retraction  10x       wall slides  10x    flex             pec stretch 3x30     door , varied arm position         SL ABD, ER, HAB 10x     OH flex  *    Wall angels  *          UE tband: Other: numb axilla and post arm     L shoulder AROM: flex 148° \"pulling in axilla\" before RX, 152° after RX    *cording from inferior incision to mid bicep     Treatment Charges: Mins Units   []  Modalities     [x]  Ther Exercise 10 1   [x]  Manual Therapy 45 3   []  Ther Activities     []  Aquatics     []  Vasocompression     []  Other     Total Treatment time 55        Assessment: [x] Progressing toward goals. PORI breast protocol to L upper quarter this date. Cont's w/ moderate cording from incision to mid bicep but improving w/ less TTP, less prominent. Mod firmness and tissue restriction lateral breast, incisions and upper post arm. Mod muscular tightness pec and subscap M's. Rev HEP and completed per log, progressed SL scab stabilization and issued HO for HEP. Emphasized proper technique as well as not pushing to pain w/ all ex and postural awareness. Rev wearing schedule for sleeve and discussed compression bra/sports bra, pt w/ good understanding. Pt to cont to monitor for S&S of lymphedema. Emphasized importance of hydration and staying moisturized throughout L upper quarter. Will cont 1x weekly through radiation. [] No change. [] Other:  [x] Patient would continue to benefit from skilled physical therapy services in order to: address the following goals    STG: (to be met in 10 treatments)  1. ? Pain: Stabilize L shoulder/chest/axillary pain and ensure optimal management of pain during all ADL's (home, occupation, community and recreation)  2. Optimize AROM of bilateral shoulders for functional activity. 3. Increase strength in L shoulder and scapula to grossly 5/5 for good postural stability.   4. ? Function: Able to sleep, complete basic ADL's, lift/carry and reach [place] w/o difficulty  5. Independent with Home Exercise Programs  6. Education on signs and symptoms, precautions regarding lymphedema. -MET        7.   Educated breast care exercises MET     LTG: (to be met in 20 treatments)  1. Maintain /optimize AROM of bilateral shoulders for functional activity to improve QOL. 2.   Decr BFI score by 1+ for improved narda to activity, incr UEFS by 15% for overall increased function  3. Continue activity to decrease risk factors associated with increased time being sedentary while undergoing chemotherapy, radiation, surgery. 4.   Improve tissue mobility of chest wall and axilla to allow for more normal motion and function  5. Control/mitigate side effects/late effects of Cancer treatment    Pt. Education:  [x] Yes  [] No  [x] Reviewed Prior HEP/Ed  Method of Education: [x] Verbal  [x] Demo  [x] Written  Comprehension of Education:  [x] Verbalizes understanding. [] Demonstrates understanding. [] Needs review. [] Demonstrates/verbalizes HEP/Ed previously given. Plan: [x] Continue current frequency toward long and short term goals.     [x] Specific Instructions for subsequent treatments: cont as narda      Time In: 430 pm           Time Out: 530 pm    Electronically signed by:  Bao Skinner, PT

## 2021-09-24 ENCOUNTER — HOSPITAL ENCOUNTER (OUTPATIENT)
Dept: RADIATION ONCOLOGY | Age: 51
Discharge: HOME | End: 2021-09-24
Attending: RADIOLOGY
Payer: COMMERCIAL

## 2021-09-24 PROCEDURE — 77387 GUIDANCE FOR RADJ TX DLVR: CPT | Performed by: RADIOLOGY

## 2021-09-24 PROCEDURE — 77412 RADIATION TX DELIVERY LVL 3: CPT | Performed by: RADIOLOGY

## 2021-09-24 PROCEDURE — G6002 STEREOSCOPIC X-RAY GUIDANCE: HCPCS | Performed by: RADIOLOGY

## 2021-09-27 ENCOUNTER — HOSPITAL ENCOUNTER (OUTPATIENT)
Dept: RADIATION ONCOLOGY | Age: 51
Discharge: HOME | End: 2021-09-27
Attending: RADIOLOGY
Payer: COMMERCIAL

## 2021-09-27 PROCEDURE — 77412 RADIATION TX DELIVERY LVL 3: CPT | Performed by: RADIOLOGY

## 2021-09-27 PROCEDURE — 77417 THER RADIOLOGY PORT IMAGE(S): CPT | Performed by: RADIOLOGY

## 2021-09-28 ENCOUNTER — HOSPITAL ENCOUNTER (OUTPATIENT)
Dept: RADIATION ONCOLOGY | Age: 51
Discharge: HOME | End: 2021-09-28
Attending: RADIOLOGY
Payer: COMMERCIAL

## 2021-09-28 PROCEDURE — 77387 GUIDANCE FOR RADJ TX DLVR: CPT | Performed by: RADIOLOGY

## 2021-09-28 PROCEDURE — 77412 RADIATION TX DELIVERY LVL 3: CPT | Performed by: RADIOLOGY

## 2021-09-28 PROCEDURE — G6002 STEREOSCOPIC X-RAY GUIDANCE: HCPCS | Performed by: RADIOLOGY

## 2021-09-29 ENCOUNTER — HOSPITAL ENCOUNTER (OUTPATIENT)
Dept: RADIATION ONCOLOGY | Age: 51
Discharge: HOME | End: 2021-09-29
Attending: RADIOLOGY
Payer: COMMERCIAL

## 2021-09-29 ENCOUNTER — TELEPHONE (OUTPATIENT)
Dept: ONCOLOGY | Age: 51
End: 2021-09-29

## 2021-09-29 ENCOUNTER — HOSPITAL ENCOUNTER (OUTPATIENT)
Dept: RADIATION ONCOLOGY | Age: 51
Discharge: HOME | End: 2021-09-29
Attending: STUDENT IN AN ORGANIZED HEALTH CARE EDUCATION/TRAINING PROGRAM
Payer: COMMERCIAL

## 2021-09-29 ENCOUNTER — OFFICE VISIT (OUTPATIENT)
Dept: ONCOLOGY | Age: 51
End: 2021-09-29
Payer: COMMERCIAL

## 2021-09-29 VITALS
TEMPERATURE: 97 F | OXYGEN SATURATION: 98 % | HEART RATE: 56 BPM | DIASTOLIC BLOOD PRESSURE: 89 MMHG | RESPIRATION RATE: 16 BRPM | WEIGHT: 144 LBS | SYSTOLIC BLOOD PRESSURE: 117 MMHG | BODY MASS INDEX: 21.9 KG/M2

## 2021-09-29 VITALS
SYSTOLIC BLOOD PRESSURE: 117 MMHG | HEART RATE: 56 BPM | TEMPERATURE: 97 F | DIASTOLIC BLOOD PRESSURE: 89 MMHG | WEIGHT: 145.4 LBS | BODY MASS INDEX: 22.11 KG/M2

## 2021-09-29 DIAGNOSIS — D05.02 BREAST NEOPLASM, TIS (LCIS), LEFT: ICD-10-CM

## 2021-09-29 DIAGNOSIS — Z80.3 FAMILY HISTORY OF BREAST CANCER: ICD-10-CM

## 2021-09-29 DIAGNOSIS — C50.412 MALIGNANT NEOPLASM OF UPPER-OUTER QUADRANT OF LEFT BREAST IN FEMALE, ESTROGEN RECEPTOR POSITIVE (HCC): Primary | ICD-10-CM

## 2021-09-29 DIAGNOSIS — Z17.0 MALIGNANT NEOPLASM OF UPPER-OUTER QUADRANT OF LEFT BREAST IN FEMALE, ESTROGEN RECEPTOR POSITIVE (HCC): Primary | ICD-10-CM

## 2021-09-29 PROCEDURE — 77387 GUIDANCE FOR RADJ TX DLVR: CPT | Performed by: RADIOLOGY

## 2021-09-29 PROCEDURE — 99214 OFFICE O/P EST MOD 30 MIN: CPT | Performed by: INTERNAL MEDICINE

## 2021-09-29 PROCEDURE — 99211 OFF/OP EST MAY X REQ PHY/QHP: CPT | Performed by: INTERNAL MEDICINE

## 2021-09-29 PROCEDURE — G6002 STEREOSCOPIC X-RAY GUIDANCE: HCPCS | Performed by: RADIOLOGY

## 2021-09-29 PROCEDURE — 77427 RADIATION TX MANAGEMENT X5: CPT | Performed by: RADIOLOGY

## 2021-09-29 PROCEDURE — 77412 RADIATION TX DELIVERY LVL 3: CPT | Performed by: RADIOLOGY

## 2021-09-29 PROCEDURE — 77336 RADIATION PHYSICS CONSULT: CPT | Performed by: RADIOLOGY

## 2021-09-29 RX ORDER — TAMOXIFEN CITRATE 20 MG/1
20 TABLET ORAL DAILY
Qty: 90 TABLET | Refills: 1 | Status: SHIPPED | OUTPATIENT
Start: 2021-09-29 | End: 2022-01-07 | Stop reason: SDUPTHER

## 2021-09-29 NOTE — PROGRESS NOTES
Waunita Rubinstein  9/29/2021  Wt Readings from Last 3 Encounters:   09/29/21 144 lb (65.3 kg)   09/22/21 142 lb (64.4 kg)   09/15/21 143 lb (64.9 kg)     Body mass index is 21.9 kg/m². Treatment Area:L breast     Patient was seen today for weekly visit. Comfort Alteration    Fatigue: None    Nutritional Alteration  Anorexia: No   Nausea: No   Vomiting: No     Mucous Membrane Alteration  Drainage: No  Lymphedema: Yes    Skin Alteration   Sensation:intact     Radiation Dermatitis:  Intact [x]     Erythema  [x]     Discoloration  []     Rash [x]   Mild  Dry desquamation  []     Moist desquamation []       Emotional  Coping: effective      Injury, potential bleeding or infection: skin care reviewed     Lab Results   Component Value Date    WBC 7.4 06/18/2021     06/18/2021         /89   Pulse 56   Temp 97 °F (36.1 °C)   Resp 16   Wt 144 lb (65.3 kg)   SpO2 98%   BMI 21.90 kg/m²   Patient Currently in Pain: Denies                 Assessment/Plan: Patient was seen today for weekly visit. Dr Marlene Seo notified and examined pt. Pt will complete treatments tomorrow. Pt to f/u in 1 month.       Elvira García RN

## 2021-09-29 NOTE — PROGRESS NOTES
Angie Fortune                                                                                                                  9/29/2021  MRN:   [id number]  YOB: 1970  PCP:                           Venkata Pineda MD  Referring Physician: No ref. provider found  Treating Physician Name: Linus Chavez MD      Reason for visit:  Chief Complaint   Patient presents with    Follow-up     review status of disease    Other     Would like to discuss next steps in treatment    Reviewed results of lab work-up and discuss treatment plan    Current problems:  Invasive lobular carcinoma of left breast, grade 2, ER positive strongly, ND positive moderate, HER-2 not amplified, clinical stage T1c, N0, M0. Pathological stage T1 cN1 aM0  Oncotype recurrence score of 15  Left breast LCIS  Premenopausal status    Active and recent treatments:  Left breast lumpectomy with sentinel lymph node biopsy-7/2021  S/p adjuvant radiation therapy  Tamoxifen    Summary of Case/History:    Angie Fortune a 45 y. o.female is a patient recently diagnosed with left breast lobular invasive cancer presents to the clinic to establish care and for further work-up and evaluation. Patient last mammogram done about 2 years ago was unremarkable but her latest mammogram showed asymmetrical density in the upper outer quadrant at 1230-1 o'clock position. This was further confirmed with ultrasound which showed a 1.7 cm x 1.3 cm mass. Patient underwent biopsy which confirmed a lobular cancer, grade 2. Ultrasound also showed suspicious lymph node however biopsy was negative for malignancy. Patient herself did not notice any palpable mass. Denies any breast pain nipple discharge denies any previous history of biopsy. Patient is premenopausal.    Patient age of menarche was 14 years. Age of first child was 31. Patient has 2 children. Patient is premenopausal.  She is not on birth control or hormonal medication.   Does not file   Tobacco Use    Smoking status: Never Smoker    Smokeless tobacco: Never Used   Substance and Sexual Activity    Alcohol use: No    Drug use: No    Sexual activity: Yes     Partners: Male   Other Topics Concern    Not on file   Social History Narrative    Not on file     Social Determinants of Health     Financial Resource Strain:     Difficulty of Paying Living Expenses:    Food Insecurity:     Worried About Running Out of Food in the Last Year:     [address] in the Last Year:    Transportation Needs:     Lack of Transportation (Medical):  Lack of Transportation (Non-Medical):    Physical Activity:     Days of Exercise per Week:     Minutes of Exercise per Session:    Stress:     Feeling of Stress :    Social Connections:     Frequency of Communication with Friends and Family:     Frequency of Social Gatherings with Friends and Family:     Attends [employer] Services:     Active Member of Clubs or Organizations:     Attends Club or Organization Meetings:     Marital Status:    Intimate Partner Violence:     Fear of Current or Ex-Partner:     Emotionally Abused:     Physically Abused:     Sexually Abused:      Current Medications:  Patient does not currently take any prescription medication    Allergies:   Wound dressing adhesive    Review of Systems:    Constitutional: No fever or chills.  No night sweats, no weight loss   Eyes: No eye discharge, double vision, or eye pain   HEENT: negative for sore mouth, sore throat, hoarseness and voice change   Respiratory: negative for cough , sputum, dyspnea, wheezing, hemoptysis, chest pain   Cardiovascular: negative for chest pain, dyspnea, palpitations, orthopnea, PND   Gastrointestinal: negative for nausea, vomiting, diarrhea, constipation, abdominal pain, Dysphagia, hematemesis and hematochezia   Genitourinary: negative for frequency, dysuria, nocturia, urinary incontinence, and hematuria   Integument: negative for rash, skin lesions, bruises. Hematologic/Lymphatic: negative for easy bruising, bleeding, lymphadenopathy, or petechiae   Endocrine: negative for heat or cold intolerance,weight changes, change in bowel habits and hair loss   Musculoskeletal: negative for myalgias, arthralgias, pain, joint swelling,and bone pain   Neurological: negative for headaches, dizziness, seizures, weakness, numbness        Physical Exam:    Vitals: /89   Pulse 56   Temp 97 °F (36.1 °C) (Oral)   Wt 145 lb 6.4 oz (66 kg)   BMI 22.11 kg/m²   General appearance - well appearing, no in pain or distress  Mental status - AAO X3  Eyes - pupils equal and reactive, extraocular eye movements intact  Mouth - mucous membranes moist, pharynx normal without lesions  Neck - supple, no significant adenopathy  Lymphatics - no palpable lymphadenopathy, no hepatosplenomegaly  Chest - clear to auscultation, no wheezes, rales or rhonchi, symmetric air entry  Heart - normal rate, regular rhythm, normal S1, S2, no murmurs  Abdomen - soft, nontender, nondistended, no masses or organomegaly  Neurological - alert, oriented, normal speech, no focal findings or movement disorder noted  Extremities - peripheral pulses normal, no pedal edema, no clubbing or cyanosis  Skin - normal coloration and turgor, no rashes, no suspicious skin lesions noted       DATA:    Results for orders placed or performed during the hospital encounter of 07/20/21   HCG, SERUM, QUALITATIVE   Result Value Ref Range    hCG Qual NEGATIVE NEGATIVE   Surgical Pathology   Result Value Ref Range    Surgical Pathology Report       -- Diagnosis --    1. Left axillary sentinel lymph nodes    -  Positive for metastatic lobular carcinoma (1/3). 2.  Left breast, additional anterior margin:    -  Negative for carcinoma.     3.  Left breast, excisional lumpectomy:    -  Invasive lobular carcinoma, grade 2.    -  Carcinomas 1.2 cm.    -  Previously reported to be ER positive, [place] positive and HER-2 not  amplified.    -  Pleomorphic lobular carcinoma in situ is present. -  Margins are free of involvement by carcinoma. WILL Chaves  **Electronically Signed Out**         rdd/7/22/2021       Clinical Information  Pre-op Diagnosis:  LEFT BREAST CARCINOMA   Operative Findings:  LEFT AXILLARY SENTINEL NODES; ADDITIONAL ANTERIOR  MARGIN  DOUBLE SUTURE BLACK IS NEW ANTERIOR MARGIN, LONG SINGLE BLACK  IS INFERIOR MARGIN; LEFT BREAST  DOUBLE SHORT BLACK SUTURE IS SUPERIOR  MARGIN, SINGLE LONG BLACK SUTURE IS LATERAL MARGIN, DOUBLE LONG BLACK  SUTURE IS MEDIAL MARGIN, BLUE SUTURE IS DEEP MARGIN  Operation Performe d:  LEFT BREAST LUMPECTOMY SENTINEL LYMPH NODE  BIOPSY WITH FROZEN SECTION AND POSSIBLE NODE DISSECTION, LEFT BREAST  ONCOPLASTIC RECONSTRUCTION WITH OSVALDO INCISIONAL WOUND VAC, PECT BLOCK    Source of Specimen  1: LEFT AXILLARY SENTINEL NODES  2: ADDITIONAL ANTERIOR MARGIN  3: LEFT BREAST    Gross Description  1. \"TERESITA SALGADO LEFT AXILLARY SENTINEL NODES\" Received three  portions adipose. A fatty node is 1.0 cm, all as FS (AFS) with H&E  TP.  12.0 x 10.0 x 9.0 mm adipose has an 8.0 x 5.0 x 5.0 millimeters  node, all for FS (BFS). Third portion of adipose is 2.0 x 1.2 x 1.2  cm and lacks a grossly discernible node, all submitted for permanent,  \"C.\"  FS x 2, 3cs/NS. 2.  \"TERESITA SALGADO, ADDITIONAL ANTERIOR MARGIN\" 3.1 x 2.1 x 0.7 cm  (M-L x S-I x A-P) oriented portion of fibrofatty tissue. The deep  margin is inked black, superior blue, inferior red and anterior green. Sectioning reveals fibrotic cut surfaces with no masses. Cassette  summary:  \"A-C\" specimen entirely seria lly submitted from medial to  lateral.  3. \"TERESITA SALGADO, DOUBLE SHORT BLACK SUTURE IS SUPERIOR MARGIN,  SINGLE LONG BLACK SUTURE IS LATERAL MARGIN, DOUBLE LONG BLACK SUTURE  IS MEDIAL MARGIN, BLUE SUTURE IS DEEP MARGIN\" 7.1 x 6.6 x 2.8 cm (S-I  x M-L x A-P) oriented portion of fibrofatty tissue.   The deep margin  is inked black, superior blue, inferior red and anterior green. Sectioning reveals a 1.2 x 1.0 x 0.9 cm ill-defined tan-white mass  lesion surrounded by dense fibrous tissue. Within the lesion is a  biopsy site with a spiral-shaped marker. The lesion is < 0.1 cm from  the anterior margin, 0.5 cm from the deep margin and at least 1.0 cm  from the remaining margins. Dense fibrous tissue extends to the  inferior margin, anterior margin and lateral margin. Cassette summary:   \"A-K\" representative sections sequentially submitted from medial to  lateral with the medial and lateral margins submitted as shaves and  the remaining margins perpendicular. Region of the lesion is i n  \"B-I. \"  All six margins are represented. tm    Intraoperative Diagnosis  FSDX:  Lymph nodes (X2), negative for carcinoma.  (RDD, 11:02)Note: A  small focus of infiltrating lobular carcinoma is present in one lymph  node present in tissue not sampled by frozen section (not present on  the frozen sections). Microscopic Description  1-3. INVASIVE BREAST CARCINOMA         PROCEDURE: Lumpectomy, sentinel node biopsies                                      SPECIMEN LATERALITY & TUMOR SITE: Left breast, prior biopsy  designated 1230                   TUMOR SIZE (LARGEST INVASIVE COMPONENT): 1.2 x 1.0 x 0.9 cm       HISTOLOGIC TYPE OF INVASIVE CARCINOMA:       HISTOLOGIC GRADE (PUNEET)--       - GLANDULAR/TUBULAR DIFFERENTIATION SCORE: 3       - NUCLEAR PLEOMORPHISM SCORE: 2       - MITOTIC RATE SCORE: 1       - OVERALL GRADE (FROM PUNEET TOTAL SCORE): Grade 2 (of 3). Note: Part 3 shows pleomorphic lobular carcinoma in situ associated  with the invasive lobular carcinoma. Part 2 shows a focus of atypical  lobular hyperplasia. The case is discussed with Dr. Gege Ford.          DUCTAL CARCINOMA IN SITU: Absent       TUMOR EXTENT       - SKIN (INVASION, SATELLITE FOCI): Not sampled       - NIPPLE: Not sampled       - SKELETAL MUSCLE: Not sampled         MARGINS - INVASIVE CARCINOMA -  - SITE(S) OF ALL POSITIVE MARGINS (SPECIFY EXTENT): Margins are free  of involvement by invasive carcinoma  - IF ALL NEGATIVE--  --SITE(S) AND DISTANCE TO CLOSEST:Invasive carcinoma: Closest margin  is 5 mm, distance to the deep margin. Next closest margin is 7 mm,  distance to the inferior margin. Anterior aspect in part 3 is free  but narrow at <1 mm, but >10 mm distant considering part 2 additional  anterior margin tissue. Pleomorphic lobular carcinoma in situ: All  margins are free of pleomorphic lobular carcinoma in situ, all >10 mm  distant. LYMPHOVASCULAR INVASION: Absent         REGIONAL LYMPH NODES -       - NUMBER EXAMINED (SENTINEL AND NON-SENTINEL): 3       - NUMB ER OF SENTINEL: 3 (a third node is found in the adipose tissue  not sampled by frozen section, cassette C, on permanent sections. Focus of metastatic carcinoma is present in this third node). - NUMBER WITH MACROMETASTASES: 1       - NUMBER WITH MICROMETASTASES:      0  - NUMBER WITH ISOLATED TUMOR CELLS: 0       - SIZE OF LARGEST METASTATIC DEPOSIT: Subcapsular focus with 4 mm  largest linear dimension       - EXTRANODAL EXTENSION: AbsentNote: Lymph nodes are evaluated through  levels and by control appropriate immunostaining with pancytokeratin. TREATMENT EFFECT: RESPONSE TO PRE-SURGICAL  (NEOADJUVANT) THERAPY (IF APPLICABLE)--       - IN BREAST TISSUES: N/A       - IN LYMPH NODES:.  N/A         DISTANT METASTASIS pM  (only if confirmed pathologically in this case): N/A    PATHOLOGIC STAGE CLASSIFICATION (pTNM):     pT1c  pN1a  (T and /or N descriptors used only if applicable)    CAP InvasiveBreast 4500 in conjunction with AJCC 8 ed. BIOMARKER  TESTING  BREAST CARCINOMA         ESTROGEN RECEPTOR*--   Previously determined to be positive (see ES , left breast  1230, 6/3/2021). PROGESTERONE RECEPTOR*--  Previously determined to be positive. HER2*--  HER2 PROTEIN EXPRESSION (IMMUNOHISTOCHEMISTRY):     N/A  HER2 GENE AMPLIFICATION (INSITU HYBRIDIZATION):     Previously  determined to be not amplified by FISH         COLD ISCHEMIA TIME (TARGET UP TO 60 MIN): Adequate  FIXATION TIME (TARGET 6-72 HRS): Adequate    *Information on biomarker regents:  Estrogen Receptor: If performed at [facility] laboratory: Immunostain  clone SP1 with a polymer based detection system, vendor [address] (FDA cleared); evaluated by manual morphometry  (negative=less than 1% tumor cell nuclear staining; otherwise  positive); external control is reactive. Progesterone Receptor: If performed at [facility] laboratory: Immunostain  clone 1E2 with a polymer based detection system, vendor [address] (FDA cleared);  evaluated by manual morphometry  (negative=less than 1% tumor cell nuclear staining; otherwise  positive); external control is reactive. ER/AR testing of decalcified specimens has not been validated. HER 2 insitu hybridization: FDA cleared, vendor [facility] [employer]. CAP BreastgoActers 1410 in conjunction with AJCC 8 ed. SURGICAL PATHOLOGY CONSULTATION       Patient Name: Jagdeep Corrigan Med Rec: [id number]  Path Number: [id number]    [facility]  ANATOMIC PATHOLOGY  [address] [place] [address]. [facility], [address]  [phone]  Fax: [phone] nm LYMPHOSCINTIGRAM    Result Date: 7/20/2021  EXAMINATION: LYMPHOSCINTIGRAPHY (INJECTION AND IMAGES) 7/20/2021 7:49 am TECHNIQUE: Sulfur colloid labeled with 600 mCi of Tc99 was provided to Dr. Pako Ortiz for sentinel lymph node localization. After obtaining informed consent and performing a time-out using [facility] protocol, 4 equal injections for a total of 600 microcuries of technetium pertechnetate was injected subcutaneously into the 4 poles about the left nipple in preparation for left sentinel node biopsy.  Post-injection lymphoscintigraphy demonstrated migration of the radioisotope into the left axillary region  Patient was then taken to the OR for probe guided lymph node localization after injection. Hermelinda Hoang HISTORY: ORDERING SYSTEM PROVIDED HISTORY: Malignant neoplasm of left female breast, unspecified estrogen receptor status, unspecified site of breast [facility]) TECHNOLOGIST PROVIDED HISTORY: Is the patient pregnant?->No Reason for Exam: Left Breast CA Acuity: Unknown Type of Exam: Unknown     Lymph nodes localized with probe in the OR. Impression:  Invasive lobular carcinoma of left breast, grade 2, ER positive strongly, NC positive moderate, HER-2 not amplified, clinical stage T1c, N0, M0. Pathological stage T1 cN1 aM0  Recurrence score of 15  Left breast LCIS  Axillary lymph node cortical thickening  Premenopausal status    Plan:  I had a detailed discussion with the patient and personally went over results of lab work-up imaging studies and other relevant clinical data  Hormonal testing suggests premenopausal status  Patient is continue radiation therapy tolerated well  Recommend adjuvant tamoxifen  Reviewed potential side effects  Plan to treat patient for 5 years  Reviewed potential side effects including risk of endometrial cancer as well as venous thromboembolism. Recommend annual screening mammogram.  Recommend annual pelvic exam  NCCN guidelines were reviewed and discussed with the patient. The diagnosis and care plan were discussed with the patient in detail. I discussed the natural history of the disease, prognosis, risks and goals of therapy and answered all the patients questions to the best of my ability. Patient expressed understanding and was in agreement.       Chastity Ralph MD          This note is created with the assistance of a speech recognition program.  While intending to generate a document that actually reflects the content of the visit, the document can still have some errors including those of syntax and sound a like substitutions which may escape proof reading. It such instances, actual meaning can be extrapolated by contextual diversion.

## 2021-09-30 ENCOUNTER — HOSPITAL ENCOUNTER (OUTPATIENT)
Dept: PHYSICAL THERAPY | Facility: CLINIC | Age: 51
Setting detail: THERAPIES SERIES
Discharge: HOME | End: 2021-09-30
Payer: COMMERCIAL

## 2021-09-30 ENCOUNTER — HOSPITAL ENCOUNTER (OUTPATIENT)
Dept: RADIATION ONCOLOGY | Age: 51
Discharge: HOME | End: 2021-09-30
Attending: RADIOLOGY
Payer: COMMERCIAL

## 2021-09-30 PROCEDURE — 97110 THERAPEUTIC EXERCISES: CPT

## 2021-09-30 PROCEDURE — 77412 RADIATION TX DELIVERY LVL 3: CPT | Performed by: RADIOLOGY

## 2021-09-30 PROCEDURE — 77387 GUIDANCE FOR RADJ TX DLVR: CPT | Performed by: RADIOLOGY

## 2021-09-30 PROCEDURE — G6002 STEREOSCOPIC X-RAY GUIDANCE: HCPCS | Performed by: RADIOLOGY

## 2021-09-30 PROCEDURE — 97140 MANUAL THERAPY 1/> REGIONS: CPT

## 2021-09-30 NOTE — FLOWSHEET NOTE
[] [facility]  [address]  [phone]  F: [phone] [] [address]  [facility]   [address]  P: [phone]  F: [phone] [x] [address]  [address]  P: [phone]  F: [phone] [] [address]  P: [phone]  F: [phone] [] [address]  [facility]   [address]   [place]: [phone]  F: [phone]      Physical Therapy Daily Treatment Note    Date:  2021  Patient Name:  Sherry rTan    :  1970  MRN: [id number]  Physician: Canelo Galloway MD                               Insurance: [employer]  hard max  Medical Diagnosis: Damian [place]. eoplasm of left breast, ER+, unspecified site of breast  Rehab Codes: C50.912, Z17.0  Onset date: 21 surg      Next Dr's appt.: 21, ongoing   Visit# / total visits:      Cancels/No Shows: 0    Subjective:  Pt comes from last radiation today. Cont's w/ tightness from under arm across chest, \"feels like a tight band. \" Notes occ twinge of pain but feels overall narda radiation well w/ some soreness/redness. States she slept wrong and neck is sore today also. Pain:  [x] Yes  [] No Location: L breast, axilla Pain Rating: (0-10 scale) 1/10  Pain altered Tx:  [x] No  [] Yes  Action:  Comments:    Objective:  Modalities:   Precautions:L BrCa, 1+/3 LN. Lumpectomy 21. No chemo, radiation 16 treatments -. Then tamoxifen  Manual: Utilized PORi breast protocol to LEFT upper quarter for gentle manual lymphatic drainage, myofascial release and joint mobility to facilitate better function ROM and dynamics of lymph system.   Exercises: Phase 1  Exercise Reps/ Time Weight/ Level Comments             Diaphragmatic breathing 5 cycles     Bye bye  3x   fwd   Wand flexion  10x  HEP     Bye, bye ex 3x   Fwd, abd   Elbow winging supine 1m                 Post shld rolls 10x     Scapular retraction  10x       wall slides  10x    flex             pec stretch 3x30     door , varied arm position         SL ABD, ER, HAB 10x     OH flex  *    Wall angels  *          UE tband: Other: numb axilla and post arm     L shoulder AROM: flex 146° \"pulling in axilla\" before RX, 151° after RX    *cording from inferior incision to mid bicep     Treatment Charges: Mins Units   []  Modalities     [x]  Ther Exercise 10 1   [x]  Manual Therapy 45 3   []  Ther Activities     []  Aquatics     []  Vasocompression     []  Other     Total Treatment time 55        Assessment: [x] Progressing toward goals. Cont 'd w/ gentle PORI breast protocol to L upper quarter. Min erythema and rash axilla and inframammary, mod cording from incision to mid bicep but improving w/ less TTP, less prominent. Mod firmness and tissue restriction lateral breast, incisions and upper post arm and muscular tightness pec and subscap M's. Rev HEP and completed per log w/ emphasis on comfortable ROM and not pushing to discomfort. Rev wearing schedule for sleeve after radiation and to look into compression bra/sports bra. Emphasized importance of cont'd hydration and staying moisturized throughout L upper quarter. Will cont bi weekly for manual, ROM and ex progression. [] No change. [] Other:  [x] Patient would continue to benefit from skilled physical therapy services in order to: address the following goals    STG: (to be met in 10 treatments)  1. ? Pain: Stabilize L shoulder/chest/axillary pain and ensure optimal management of pain during all ADL's (home, occupation, community and recreation)  2. Optimize AROM of bilateral shoulders for functional activity. 3. Increase strength in L shoulder and scapula to grossly 5/5 for good postural stability.   4. ? Function: Able to sleep, complete basic ADL's, lift/carry and reach OH w/o difficulty  5. Independent with Home Exercise Programs  6. Education on signs and symptoms, precautions regarding lymphedema. -MET        7.   Educated breast care exercises MET     LTG: (to be met in 20 treatments)  1. Maintain /optimize AROM of bilateral shoulders for functional activity to improve QOL. 2.   Decr BFI score by 1+ for improved narda to activity, incr UEFS by 15% for overall increased function  3. Continue activity to decrease risk factors associated with increased time being sedentary while undergoing chemotherapy, radiation, surgery. 4.   Improve tissue mobility of chest wall and axilla to allow for more normal motion and function  5. Control/mitigate side effects/late effects of Cancer treatment    Pt. Education:  [x] Yes  [] No  [x] Reviewed Prior HEP/Ed  Method of Education: [x] Verbal  [x] Demo  [x] Written  Comprehension of Education:  [x] Verbalizes understanding. [] Demonstrates understanding. [] Needs review. [] Demonstrates/verbalizes HEP/Ed previously given. Plan: [x] Continue current frequency toward long and short term goals.     [x] Specific Instructions for subsequent treatments: cont as narda      Time In: 0900 am           Time Out: 1000 am    Electronically signed by:  Mercedes Lozoya, PT

## 2021-10-01 ENCOUNTER — TELEPHONE (OUTPATIENT)
Dept: INFUSION THERAPY | Age: 51
End: 2021-10-01

## 2021-10-01 NOTE — PROGRESS NOTES
[address] [facility], [address] [place] [address]  Fax [phone]  Office [address] WEEKLY PROGRESS NOTE  Patient ID:   Maddi Gallagher  : 1970   MRN: [id number]    DIAGNOSIS:  Cancer Staging  Malignant neoplasm of upper-outer quadrant of left breast in female, estrogen receptor positive ([address]  Staging form: Breast, AJCC 8th Edition  - Clinical stage from 6/3/2021: Stage IA (cT1c, cN0, cM0, G2, ER+, FL+, HER2-) - Signed by Mary Richter MD on 2021  - Pathologic stage from 2021: Stage IA (pT1c, pN1a(sn), cM0, G2, ER+, FL+, HER2-) - Signed by Mary Richter MD on 2021      TREATMENT DETAILS:  Treatment Site: left breast   Actual Dose: 3990cGy  Total Planned Dose: 4256cGy  Treatment Technique: 3D-CRT  Fraction Technique: Daily  Therapy imaging monitoring: ports  Concurrent Chemotherapy: none    SUBJECTIVE:   Patient seen for their weekly on treatment evaluation today. Finishes tomorrow. No major issues. No desquamation or erythema of skin. OBJECTIVE:   CHAPERONE: Not Required    ECO Asymptomatic    VITAL SIGNS: There were no vitals taken for this visit. Wt Readings from Last 5 Encounters:   21 144 lb (65.3 kg)   21 145 lb 6.4 oz (66 kg)   21 142 lb (64.4 kg)   09/15/21 143 lb (64.9 kg)   21 144 lb (65.3 kg)     GENERAL:  General appearance is that of a well-nourished, well-developed in no apparent distress. HEENT: Normocephalic, atraumatic, EOMI, moist mucosa, no erythema. Indirect exam .  NECK:  No adenopathy or a palpable thyroid mass, trachea is midline. LYMPHATICS: No cervical, supraclavicular, or axillary adenopathy. HEART:  Normal rate and regular rhythm, normal heart sounds. LUNGS:  Pulmonary effort normal. Normal breath sounds. ABDOMEN:  Soft, nontender, non distended, and no hepatosplenomegaly  EXTREMITIES:  No edema. No calf tenderness. MSK:  No spinal tenderness. Normal ROM.   NEUROLOGICAL: Alert and oriented. Strength and sensation intact bilaterally. No focal deficits. PSYCH: Mood normal, behavior normal.  SKIN: No erythema, no desquamation. RECTAL: Deferred  GYN: Deferred   BREAST: Deferred     LABS:  WBC   Date Value Ref Range Status   06/18/2021 7.4 3.5 - 11.0 k/uL Final     Segs Absolute   Date Value Ref Range Status   06/18/2021 4.90 1.8 - 7.7 k/uL Final     Hemoglobin   Date Value Ref Range Status   06/18/2021 15.1 12.0 - 16.0 g/dL Final     Platelets   Date Value Ref Range Status   06/18/2021 253 140 - 450 k/uL Final     CREATININE   Date Value Ref Range Status   06/18/2021 0.79 0.50 - 0.90 mg/dL Final     No results found for: , CEA  No results found for: PSA    MEDICATIONS:    Current Outpatient Medications:     tamoxifen (NOLVADEX) 20 MG tablet, Take 1 tablet by mouth daily, Disp: 90 tablet, Rfl: 1    Multiple Vitamins-Minerals (THERAPEUTIC MULTIVITAMIN-MINERALS) tablet, Take 1 tablet by mouth daily, Disp: , Rfl:     clobetasol (TEMOVATE) 0.05 % ointment, Apply topically 2 times daily. , Disp: 45 g, Rfl: 0      ASSESSMENT PLAN:     Doing well, finishes tx tomorrow. Follow up in 4-6 week or sooner if needed. Treatment setup and plan reviewed. Port images/CBCT images reviewed. Appropriate laboratory work was reviewed. Treatment side effects and toxicities reviewed with the patient, and appropriate management was advised. Will continue radiation treatment as planned, and recommend patient contact us if they have any questions or concerns. Electronically signed by Carley Rubio MD on 10/1/2021 at 10:28 AM      Drugs Prescribed:  New Prescriptions    TAMOXIFEN (NOLVADEX) 20 MG TABLET    Take 1 tablet by mouth daily       Other Orders Placed:  No orders of the defined types were placed in this encounter.

## 2021-10-06 NOTE — PROGRESS NOTES
[facility]            Radiation Oncology          [address] [facility], [address]        Gena Grant: [phone]        F: [phone]       [facility]. [address] [facility]       Radiation Oncology   [address], [address] [facility]       Gena Grant: [phone]       F: [phone]       [facility]      Dear Dr Remy Public: Thank you for referring Tarah Mendoza to me for evaluation and treatment. Below is a summary of the patient's recently completed radiation course. If you have questions, please do not hesitate to call me. I look forward to following this patient along with you.     Sincerely,  Electronically signed by Aixa Trent MD on 10/6/21 at 5:42 PM EDT      CC: Patient Care Team:  Sylvia Abbott MD as PCP - General (Family Medicine)  Sylvia Abbott MD as PCP - [facility]  Phong Pedroza RN as Nurse Navigator (Oncology)  ------------------------------------------------------------------------------------------------------------------------------------------------------------------------------------------        Date of Service: 2021     Location:  [facility] Radiation Oncology,   [address] [place] [address], [facility]   [phone]        RADIATION ONCOLOGY END OF TREATMENT SUMMARY:    Patient ID:   Tarah Mendoza  : 1970   MRN: [id number]    DIAGNOSIS:  Cancer Staging  Malignant neoplasm of upper-outer quadrant of left breast in female, estrogen receptor positive ([facility] [address]  Staging form: Breast, AJCC 8th Edition  - Clinical stage from 6/3/2021: Stage IA (cT1c, cN0, cM0, G2, ER+, OR+, HER2-) - Signed by Aixa Trent MD on 2021  - Pathologic stage from 2021: Stage IA (pT1c, pN1a(sn), cM0, G2, ER+, OR+, HER2-) - Signed by Aixa Trent MD on 2021      TREATMENT DETAILS:    Treatment Technique: 3D-CRT  Fraction Technique: Daily          Concurrent Chemotherapy: NA    CLINICAL COURSE:    Patient completed the treatment as prescribed and tolerated treatment as expected. Patient developed some skin irritation and fatigue. Patient will come back in 1 month for a follow up visit and to assess treatment toxicity and response. Patient was advised to continue close follow up with medical oncology as well. Patient does have our contact information in case they have any questions or concerns in the interim.     Electronically signed by Ranjan Shaikh MD on 10/6/2021 at 5:42 PM

## 2021-10-11 ENCOUNTER — HOSPITAL ENCOUNTER (OUTPATIENT)
Dept: PHYSICAL THERAPY | Facility: CLINIC | Age: 51
Setting detail: THERAPIES SERIES
Discharge: HOME | End: 2021-10-11
Payer: COMMERCIAL

## 2021-10-11 PROCEDURE — 97140 MANUAL THERAPY 1/> REGIONS: CPT

## 2021-10-11 PROCEDURE — 97110 THERAPEUTIC EXERCISES: CPT

## 2021-10-11 NOTE — FLOWSHEET NOTE
[] [facility]  [address]  [phone]  F: [phone] [] [address]  [facility]   [address]  P: [phone]  F: [phone] [x] [place]  [address]  P: [phone]  F: [phone] [] [address]  P: [phone]  F: [phone] [] [address]  [facility]   [address]   [place]: [phone]  F: [phone]      Physical Therapy Daily Treatment Note    Date:  10/11/2021  Patient Name:  Sherry Tran    :  1970  MRN: [id number]  Physician: Canelo Galloway MD                               Insurance: [facility]  hard max  Medical Diagnosis: Damian Chichester. eoplasm of left breast, ER+, unspecified site of breast  Rehab Codes: C50.912, Z17.0  Onset date: 21 surg      Next Dr's appt.: 21, ongoing   Visit# / total visits:      Cancels/No Shows: 0    Subjective:  Pt c/o rash across chest, dark skin in axilla and whole chest/breast is tender. Notes less tightness w/ movement and wearing sleeve daily w/o issue. Started tamoxifen yesterday and notes fatigue isnt to bad. Neck is better. Pain:  [x] Yes  [] No Location: L breast, axilla Pain Rating: (0-10 scale) 1/10  Pain altered Tx:  [x] No  [] Yes  Action:  Comments:    Objective:  Modalities:   Precautions:L BrCa, 1+/3 LN. Lumpectomy 21. No chemo, radiation 16 treatments -. Tamoxifen started 10/10/21  Manual: Utilized PORi breast protocol to LEFT upper quarter for gentle manual lymphatic drainage, myofascial release and joint mobility to facilitate better function ROM and dynamics of lymph system.   Exercises: Phase 1*  Exercise Reps/ Time Weight/ Level Comments             Diaphragmatic breathing 5 cycles       Bye bye 3x   fwd   Wand flexion  10x  HEP     Bye, bye ex 3x   Fwd, abd   Elbow winging supine 1m                 Post shld rolls 10x     Scapular retraction  10x       wall slides  10x    flex             pec stretch 3x30     door , varied arm position         SL ABD, ER, HAB 10x     OH flex 10x *    Wall angels 10x *          UE tband: Other: numb axilla and post arm     L shoulder AROM: flex 146° \"pulling in axilla\" before RX, 151° after RX    *cording from inferior incision to mid bicep     Treatment Charges: Mins Units   []  Modalities     [x]  Ther Exercise 10 1   [x]  Manual Therapy 45 3   []  Ther Activities     []  Aquatics     []  Vasocompression     []  Other     Total Treatment time 55        Assessment: [x] Progressing toward goals. PORI breast protocol to L upper quarter. No erythema, min rash across chest, mod discoloring axilla and inframammary. Cont's w/ cording from incision to mid bicep, less TTP, less prominent. Mod firmness and tissue restriction lateral breast, incisions and upper post arm and muscular tightness pec and subscap M's. Rev HEP and completed per log w/ emphasis on comfortable ROM and not pushing to discomfort. Rev wearing schedule for sleeve after radiation. Emphasized importance of cont'd hydration and staying moisturized throughout L upper quarter. Will cont bi weekly for manual, ROM and ex progression. [] No change. [] Other: Ex guidelines*  [x] Patient would continue to benefit from skilled physical therapy services in order to: address the following goals    STG: (to be met in 10 treatments)  1. ? Pain: Stabilize L shoulder/chest/axillary pain and ensure optimal management of pain during all ADL's (home, occupation, community and recreation)  2. Optimize AROM of bilateral shoulders for functional activity. 3. Increase strength in L shoulder and scapula to grossly 5/5 for good postural stability.   4. ? Function: Able to sleep, complete basic ADL's, lift/carry and reach [place] w/o difficulty  5. Independent with Home Exercise Programs  6. Education on signs and symptoms, precautions regarding lymphedema. -MET        7.   Educated breast care exercises MET     LTG: (to be met in 20 treatments)  1. Maintain /optimize AROM of bilateral shoulders for functional activity to improve QOL. 2.   Decr BFI score by 1+ for improved narda to activity, incr UEFS by 15% for overall increased function  3. Continue activity to decrease risk factors associated with increased time being sedentary while undergoing chemotherapy, radiation, surgery. 4.   Improve tissue mobility of chest wall and axilla to allow for more normal motion and function  5. Control/mitigate side effects/late effects of Cancer treatment    Pt. Education:  [x] Yes  [] No  [x] Reviewed Prior HEP/Ed  Method of Education: [x] Verbal  [x] Demo  [x] Written  Comprehension of Education:  [x] Verbalizes understanding. [] Demonstrates understanding. [] Needs review. [] Demonstrates/verbalizes HEP/Ed previously given. Plan: [x] Continue current frequency toward long and short term goals.     [x] Specific Instructions for subsequent treatments: cont as narda      Time In: 0900 am           Time Out: 1000 am    Electronically signed by:  Laura Carter, PT

## 2021-10-13 ENCOUNTER — TELEPHONE (OUTPATIENT)
Dept: ONCOLOGY | Age: 51
End: 2021-10-13

## 2021-10-13 NOTE — TELEPHONE ENCOUNTER
Called and spoke with [facility]. She relates that radiation therapy is done. Her skin is reddened from radiation, but does not really hurt. She relates she has started tamoxifen but has only taken it for 3 days. She relates so far so good. Encouraged her to call as needed. Wished her well. Pt on pending.

## 2021-10-25 ENCOUNTER — HOSPITAL ENCOUNTER (OUTPATIENT)
Dept: PHYSICAL THERAPY | Facility: CLINIC | Age: 51
Setting detail: THERAPIES SERIES
Discharge: HOME | End: 2021-10-25
Payer: COMMERCIAL

## 2021-10-25 PROCEDURE — 97110 THERAPEUTIC EXERCISES: CPT

## 2021-10-25 PROCEDURE — 97140 MANUAL THERAPY 1/> REGIONS: CPT

## 2021-10-25 NOTE — FLOWSHEET NOTE
[] [facility]  [address]  [phone]  F: [phone] [] [address]  [facility]   [address]  P: [phone]  F: [phone] [x] [place]  [address] [facility]  P: [phone]  F: [phone] [] [address]  P: [phone]  F: [phone] [] [address]  [facility]   [address]   [place]: [phone]  F: [phone]      Physical Therapy Daily Treatment Note    Date:  10/25/2021  Patient Name:  Teddy Solorzano    :  1970  MRN: [id number]  Physician: Dulce Haynes MD                               Insurance: [employer]  hard max  Medical Diagnosis: Ursula Hare. eoplasm of left breast, ER+, unspecified site of breast  Rehab Codes: C50.912, Z17.0  Onset date: 21 surg      Next Dr's appt.: 21, ongoing   Visit# / total visits:      Cancels/No Shows: 0    Subjective:  Pt c/o tightness in chest/axilla no pain. Still darkened in axilla but no rash or peeling. Wearing sleeve everyday, gets annoying at end of day, no heaviness/fullness noted. Marisela tamoxifen well so far and feels fatigue improving. Pain:  [x] Yes  [] No Location: L breast, axilla Pain Rating: (0-10 scale) 1/10  Pain altered Tx:  [x] No  [] Yes  Action:  Comments:    Objective:  Modalities:   Precautions:L BrCa, 1+/3 LN. Lumpectomy 21. No chemo, radiation 16 treatments -. Tamoxifen started 10/10/21  Manual: Utilized PORi breast protocol to LEFT upper quarter for gentle manual lymphatic drainage, myofascial release and joint mobility to facilitate better function ROM and dynamics of lymph system.   Exercises: Phase 1: 1-3 RPE (resting to easy) until 3 mo post chemo/rad/immuno ( 21 progress to phase services in order to: address the following goals    STG: (to be met in 10 treatments)  1. ? Pain: Stabilize L shoulder/chest/axillary pain and ensure optimal management of pain during all ADL's (home, occupation, community and recreation)  2. Optimize AROM of bilateral shoulders for functional activity. 3. Increase strength in L shoulder and scapula to grossly 5/5 for good postural stability. 4. ? Function: Able to sleep, complete basic ADL's, lift/carry and reach [place] w/o difficulty  5. Independent with Home Exercise Programs  6. Education on signs and symptoms, precautions regarding lymphedema. -MET        7.   Educated breast care exercises MET     LTG: (to be met in 20 treatments)  1. Maintain /optimize AROM of bilateral shoulders for functional activity to improve QOL. 2.   Decr BFI score by 1+ for improved narda to activity, incr UEFS by 15% for overall increased function  3. Continue activity to decrease risk factors associated with increased time being sedentary while undergoing chemotherapy, radiation, surgery. 4.   Improve tissue mobility of chest wall and axilla to allow for more normal motion and function  5. Control/mitigate side effects/late effects of Cancer treatment    Pt. Education:  [x] Yes  [] No  [x] Reviewed Prior HEP/Ed  Method of Education: [x] Verbal  [x] Demo  [x] Written  Comprehension of Education:  [x] Verbalizes understanding. [] Demonstrates understanding. [] Needs review. [] Demonstrates/verbalizes HEP/Ed previously given. Plan: [x] Continue current frequency toward long and short term goals.     [x] Specific Instructions for subsequent treatments: cont as narda      Time In: 1100 am           Time Out: 1200 pm    Electronically signed by:  Selestino Favre, PT

## 2021-10-28 ENCOUNTER — HOSPITAL ENCOUNTER (OUTPATIENT)
Dept: RADIATION ONCOLOGY | Age: 51
Discharge: HOME | End: 2021-10-28
Attending: RADIOLOGY
Payer: COMMERCIAL

## 2021-10-28 VITALS
DIASTOLIC BLOOD PRESSURE: 73 MMHG | HEART RATE: 62 BPM | SYSTOLIC BLOOD PRESSURE: 120 MMHG | WEIGHT: 146 LBS | OXYGEN SATURATION: 99 % | TEMPERATURE: 97.8 F | BODY MASS INDEX: 21.88 KG/M2 | RESPIRATION RATE: 18 BRPM

## 2021-10-28 PROCEDURE — 99212 OFFICE O/P EST SF 10 MIN: CPT | Performed by: RADIOLOGY

## 2021-10-28 NOTE — PROGRESS NOTES
Jamal Gaming  10/28/2021  10:52 AM      Vitals:    10/28/21 1045   BP: 120/73   Pulse: 62   Resp: 18   Temp: 97.8 °F (36.6 °C)   SpO2: 99%    :  Patient Currently in Pain: Denies             Wt Readings from Last 1 Encounters:   10/28/21 146 lb (66.2 kg)                Current Outpatient Medications:     tamoxifen (NOLVADEX) 20 MG tablet, Take 1 tablet by mouth daily, Disp: 90 tablet, Rfl: 1    Multiple Vitamins-Minerals (THERAPEUTIC MULTIVITAMIN-MINERALS) tablet, Take 1 tablet by mouth daily, Disp: , Rfl:                FALLS RISK SCREEN  Instructions:  Assess the patient and enter the appropriate indicators that are present for fall risk identification. Total the numbers entered and assign a fall risk score from Table 2.  Reassess patient at a minimum every 12 weeks or with status change. Assessment   Date  10/28/2021     1. Mental Ability: confusion/cognitively impaired 0     2. Elimination Issues: incontinence, frequency 0       3. Ambulatory: use of assistive devices (walker, cane, off-loading devices),        attached to equipment (IV pole, oxygen) 0     4. Sensory Limitations: dizziness, vertigo, impaired vision 0     5. Age less than 65        0     6. Age 72 or greater 0     7. Medication: diuretics, strong analgesics, hypnotics, sedatives,        antihypertensive agents 0   8. Falls:  recent history of falls within the last 3 months (not to include slipping or        tripping) 0   TOTAL 0    If score of 4 or greater was education given? No           TABLE 2   Risk Score Risk Level Plan of Care   0-3 Little or  No Risk 1. Provide assistance as indicated for ambulation activities  2. Reorient confused/cognitively impaired patient  3. Chair/bed in low position, stretcher/bed with siderails up except when performing patient care activities  5.   Educate patient/family/caregiver on falls prevention  6.  Reassess in 12 weeks or with any noted change in patient condition which places them at a risk for a fall   4-6 Moderate Risk 1. Provide assistance as indicated for ambulation activities  2. Reorient confused/cognitively impaired patient  3. Chair/bed in low position, stretcher/bed with siderails up except when performing patient care activities  4. Educate patient/family/caregiver on falls prevention     7 or   Higher High Risk 1. Place patient in easily observable treatment room  2. Patient attended at all times by family member or staff  3. Provide assistance as indicated for ambulation activities  4. Reorient confused/cognitively impaired patient  5. Chair/bed in low position, stretcher/bed with siderails up except when performing patient care activities  6. Educate patient/family/caregiver on falls prevention         PLAN: Patient is seen today in follow up. She reprots no new concerns at this time. Dr Bart Jacob notified and examined pt.  Pt to f/u in 6 months          Marlene Jeffers RN

## 2021-10-28 NOTE — PROGRESS NOTES
[facility]            Radiation Oncology          [address] [facility]          [place], [facility] [address]        Joann Wong: [phone]        F: [phone]       [email or web address]         Date of Service: 10/28/2021     Location:  [address] [place] [address],   [address]  [facility] Kemar Bedoya   [phone]        RADIATION ONCOLOGY FOLLOW UP NOTE    Patient ID:   Cory Ortiz  : 1970   MRN: [id number]    DIAGNOSIS:  Cancer Staging  Malignant neoplasm of upper-outer quadrant of left breast in female, estrogen receptor positive ([facility] [address]  Staging form: Breast, AJCC 8th Edition  - Clinical stage from 6/3/2021: Stage IA (cT1c, cN0, cM0, G2, ER+, OR+, HER2-) - Signed by Georgette Conway MD on 2021  - Pathologic stage from 2021: Stage IA (pT1c, pN1a(sn), cM0, G2, ER+, OR+, HER2-) - Signed by Georgette Conway MD on 2021    Left breast IDC pT1N1a(sn)M0 ER/OR positive, Her2 unamplified   Lumpectomy and SLNBx   Adjuvant radiation therapy 4256 cGy in 16 fx   Adjuvant endocrine therapy ongoing     INTERVAL HISTORY:   Cory Ortiz is a 45 y.o. [place] female who presents for follow-up of the above diagnosis and treatment history. She is doing well and has no new problems or complaints. She denies any new skin changes, lumps or bumps, nipple discharge, range of motion issues with either arm, edema of either upper extremity, cough, chest pain, new bony pain, or involuntary weight loss. She was started on tamoxifen about 3 weeks ago and has not noted any untoward effects of that. She has appointment with surgery in the next month or 2 and has appointment medical oncology in December.       MEDICATIONS:    Current Outpatient Medications:     tamoxifen (NOLVADEX) 20 MG tablet, Take 1 tablet by mouth daily, Disp: 90 tablet, Rfl: 1    Multiple Vitamins-Minerals (THERAPEUTIC MULTIVITAMIN-MINERALS) tablet, Take 1 tablet by mouth daily, Disp: , Rfl:     ALLERGIES:  Allergies Allergen Reactions    Wound Dressing Adhesive Rash     Steri strips          REVIEW OF SYSTEMS:    A full 14 point review of systems was performed and assessed and found to be negative except as noted above. PHYSICAL EXAMINATION:    CHAPERONE: Not Required    ECO Asymptomatic    VITAL SIGNS: /73   Pulse 62   Temp 97.8 °F (36.6 °C)   Resp 18   Wt 146 lb (66.2 kg)   SpO2 99%   BMI 21.88 kg/m²   GENERAL:  General appearance is that of a well-nourished, well-developed in no apparent distress. HEENT: Normocephalic, atraumatic, EOMI, moist mucosa, no erythema. Indirect exam .  NECK:  No adenopathy or a palpable thyroid mass, trachea is midline. LYMPHATICS: No cervical, supraclavicular, or axillary adenopathy. HEART:  Normal rate and regular rhythm, normal heart sounds. LUNGS:  Pulmonary effort normal. Normal breath sounds. ABDOMEN:  Soft, nontender, non distended, and no hepatosplenomegaly. EXTREMITIES:  No edema. No calf tenderness. MSK:  No spinal tenderness. Normal ROM. NEUROLOGICAL: Alert and oriented. Strength and sensation intact bilaterally. No focal deficits. PSYCH: Mood normal, behavior normal.  SKIN: No erythema, no desquamation.   RECTAL: Deferred   GYN: Deferred   BREAST: Deferred       LABS:  WBC   Date Value Ref Range Status   2021 7.4 3.5 - 11.0 k/uL Final     Segs Absolute   Date Value Ref Range Status   2021 4.90 1.8 - 7.7 k/uL Final     Hemoglobin   Date Value Ref Range Status   2021 15.1 12.0 - 16.0 g/dL Final     Platelets   Date Value Ref Range Status   2021 253 140 - 450 k/uL Final     No results found for: , CEA  No results found for: PSA    IMAGING:   None new       ASSESSMENT AND PLAN:  Rudy Horton is a 45 y.o. female with a Cancer Staging  Malignant neoplasm of upper-outer quadrant of left breast in female, estrogen receptor positive ([facility] [address]  Staging form: Breast, AJCC 8th Edition  - Clinical stage from 6/3/2021: Stage IA (cT1c, cN0, cM0, G2, ER+, MO+, HER2-) - Signed by Serina Acosta MD on 6/21/2021  - Pathologic stage from 7/20/2021: Stage IA (pT1c, pN1a(sn), cM0, G2, ER+, MO+, HER2-) - Signed by Serina Acosta MD on 8/19/2021  . She is doing well and is healing from the acute toxicities of radiation therapy. I will see her back in 6 months for follow-up continued oncologic surveillance. She has call anytime with any questions concerns may arise in interim. Patient is recommended to come back in 6 months for another follow up visit and exam, or can call to come see us sooner if symptoms change. Patient was in agreement with my recommendations. All questions were answered to their satisfaction. Patient was advised to contact us anytime should they have any questions or concerns. Electronically signed by Rolando Bradley MD on 10/28/2021 at 11:56 AM        Medications Prescribed:   New Prescriptions    No medications on file       Orders: No orders of the defined types were placed in this encounter.       CC:  Patient Care Team:  Martin Worrell MD as PCP - General (Family Medicine)  Martin Worrell MD as PCP - [facility] Empaneled Provider  Shala Page RN as Nurse Navigator (Oncology)

## 2021-11-08 ENCOUNTER — HOSPITAL ENCOUNTER (OUTPATIENT)
Dept: PHYSICAL THERAPY | Facility: CLINIC | Age: 51
Setting detail: THERAPIES SERIES
Discharge: HOME | End: 2021-11-08
Payer: COMMERCIAL

## 2021-11-08 PROCEDURE — 97110 THERAPEUTIC EXERCISES: CPT

## 2021-11-08 PROCEDURE — 97140 MANUAL THERAPY 1/> REGIONS: CPT

## 2021-11-08 NOTE — FLOWSHEET NOTE
[] [facility] [address]  [address]  [phone]  F: [phone] [] [address]  [facility]   [address]  P: [phone]  F: [phone] [x] [place]  [address]  P: [phone]  F: [phone] [] [address]  P: [phone]  F: [phone] [] [address]  [facility]   [address]   [place]: [phone]  F: [phone]      Physical Therapy Daily Treatment Note    Date:  2021  Patient Name:  Cleveland Hernadez    :  1970  MRN: [id number]  Physician: Melanie Cooper MD                               Insurance: [employer]  hard max  Medical Diagnosis: Ahsan Sheikh. eoplasm of left breast, ER+, unspecified site of breast  Rehab Codes: C50.912, Z17.0  Onset date: 21 surg      Next Dr's appt.: 21, ongoing   Visit# / total visits:      Cancels/No Shows: 0    Subjective:  Pt reports healing well, some tightness in chest w/ arm movement. Discussed weaning sleeve after 30 days from radiation. Notes no heaviness/fullness. Marisela tamoxifen well so far. Pain:  [x] Yes  [] No Location: L breast, axilla Pain Rating: (0-10 scale) 1/10  Pain altered Tx:  [x] No  [] Yes  Action:  Comments:    Objective:  Modalities:   Precautions:L BrCa, 1+/3 LN. Lumpectomy 21. No chemo, radiation 16 treatments -. Tamoxifen started 10/10/21  Manual: Utilized PORi breast protocol to LEFT upper quarter for gentle manual lymphatic drainage, myofascial release and joint mobility to facilitate better function ROM and dynamics of lymph system.   Exercises: Phase 1: 1-3 RPE (resting to easy) until 3 mo post chemo/rad/immuno ( 21 progress to phase 2)  Exercise Reps/ Time Weight/ Level Comments           Diaphragmatic breathing 5 cycles       Bye bye  3x   Fwd, side   Wand flexion  10x  HEP     Bye, bye ex 3x   Fwd, abd   Elbow winging supine 1m  HEP               Post shld rolls 10x     Scapular retraction  10x  HEP     wall slides  10x  HEP  flex             pec stretch 3x30     door , varied arm position         SL ABD, ER, HAB 10x     OH flex 10x     Wall angels 10x           UE tband: ext, row, ER, tricep, bicep 10x ea lime      Other: numb axilla and post arm     L shoulder AROM supine: flex 148° \"pulling in axilla\" before RX, 154° after RX    *cording from inferior incision to mid bicep, no N&T noted     Treatment Charges: Mins Units   []  Modalities     [x]  Ther Exercise 15 1   [x]  Manual Therapy 40 3   []  Ther Activities     []  Aquatics     []  Vasocompression     []  Other     Total Treatment time 55        Assessment: [x] Progressing toward goals. PORI breast protocol to L upper quarter. No erythema, min discoloring axilla and inframammary. Decr firmness and tissue restriction lateral breast and incisions, cont's w/ min muscular tightness pec and subscap M's. Rev HEP and completed per log w/ added UE tband ex. Strengthening ex challenging and notes some fatigue. Issued HO and tband for HEP. Emphasis on comfortable ROM and not pushing to pain. Rev wearing schedule for sleeve w/ proper weaning out of sleeve. To cont to monitor S&S of lymphedema. Will cont bi weekly for manual prn, ROM and ex progression. [] No change. [x] Other: Educated pt on benefits of working in suggested RPE to ensure safe range for exercise during/post chemotherapy/radiation to maximize gains from exercise and to minimize acute and latent side effects of cancer treatment. Patient was educated as to the importance of gradual progression as well as recovery days and issued HO and RPE scale for home.   [x] Patient would continue to benefit from skilled physical therapy services in order to: address the following goals    STG: (to be met in 10 treatments)  1. ? Pain: Stabilize L shoulder/chest/axillary pain and ensure optimal management of pain during all ADL's (home, occupation, community and recreation)  2. Optimize AROM of bilateral shoulders for functional activity. 3. Increase strength in L shoulder and scapula to grossly 5/5 for good postural stability. 4. ? Function: Able to sleep, complete basic ADL's, lift/carry and reach OH w/o difficulty  5. Independent with Home Exercise Programs  6. Education on signs and symptoms, precautions regarding lymphedema. -MET        7.   Educated breast care exercises MET     LTG: (to be met in 20 treatments)  1. Maintain /optimize AROM of bilateral shoulders for functional activity to improve QOL. 2.   Decr BFI score by 1+ for improved narda to activity, incr UEFS by 15% for overall increased function  3. Continue activity to decrease risk factors associated with increased time being sedentary while undergoing chemotherapy, radiation, surgery. 4.   Improve tissue mobility of chest wall and axilla to allow for more normal motion and function  5. Control/mitigate side effects/late effects of Cancer treatment    Pt. Education:  [x] Yes  [] No  [x] Reviewed Prior HEP/Ed  Method of Education: [x] Verbal  [x] Demo  [x] Written  Comprehension of Education:  [x] Verbalizes understanding. [] Demonstrates understanding. [] Needs review. [] Demonstrates/verbalizes HEP/Ed previously given. Plan: [x] Continue current frequency toward long and short term goals.     [x] Specific Instructions for subsequent treatments: cont as narda      Time In: 1:30 pm           Time Out: 2:30 pm    Electronically signed by:  Johnny Cook PT

## 2021-11-22 ENCOUNTER — HOSPITAL ENCOUNTER (OUTPATIENT)
Dept: PHYSICAL THERAPY | Facility: CLINIC | Age: 51
Setting detail: THERAPIES SERIES
Discharge: HOME | End: 2021-11-22
Payer: COMMERCIAL

## 2021-11-22 PROCEDURE — 97140 MANUAL THERAPY 1/> REGIONS: CPT

## 2021-11-22 PROCEDURE — 97110 THERAPEUTIC EXERCISES: CPT

## 2021-11-22 NOTE — FLOWSHEET NOTE
[] [facility] [address]  [address]  [phone]  F: [phone] [] [address]  Kl\[facility]"" 36   [address]  P: [phone]  F: [phone] [x] [place]  [address]  P: [phone]  F: [phone] [] [address]  P: [phone]  F: [phone] [] [address]  [facility]   [address]   [place]: [phone]  F: [phone]      Physical Therapy Daily Treatment Note    Date:  2021  Patient Name:  Jamal Gaming    :  1970  MRN: [id number]  Physician: Jerilyn De León MD                               Insurance: [employer]  hard max  Medical Diagnosis: Ellen Stephens. eoplasm of left breast, ER+, unspecified site of breast  Rehab Codes: C50.912, Z17.0  Onset date: 21 surg      Next Dr's appt.: 21, ongoing   Visit# / total visits: 10/20     Cancels/No Shows: 0    Subjective:  Pt reports she is feeling good, occ tingling post arm, no fullness. Wearing sleeve prn, mostly at work and activity. Marisela tamoxifen well so far. Pain:  [x] Yes  [] No Location: L breast, axilla Pain Rating: (0-10 scale) 1/10  Pain altered Tx:  [x] No  [] Yes  Action:  Comments:    Objective:  Modalities:   Precautions:L BrCa, 1+/3 LN. Lumpectomy 21. No chemo, radiation 16 treatments -. Tamoxifen started 10/10/21  Manual: Utilized PORi breast protocol to LEFT upper quarter for gentle manual lymphatic drainage, myofascial release and joint mobility to facilitate better function ROM and dynamics of lymph system.   Exercises: Phase 1: 1-3 RPE (resting to easy) until 3 mo post chemo/rad/immuno ( 21 progress to phase 2)  Exercise Reps/ Time Weight/ Level Comments             Diaphragmatic breathing 5 cycles       Bye bye  3x   Fwd, side   Wand flexion  10x  HEP     Bye, bye ex 3x   Fwd, abd   Elbow winging supine 1m  HEP               Post shld rolls 10x     Scapular retraction  10x  HEP     wall slides  10x  HEP  flex             pec stretch 3x30     door , varied arm position         SL ABD, ER, HAB 10x     OH flex 10x     Wall angels 10x           UE tband: ext, row, ER, tricep, bicep 10x ea lime      Other: numb axilla and post arm     L shoulder AROM supine: flex 148° \"pulling in axilla\" before RX, 155° after RX    *cording from inferior incision to mid bicep, no N&T noted     Treatment Charges: Mins Units   []  Modalities     [x]  Ther Exercise 15 1   [x]  Manual Therapy 40 3   []  Ther Activities     []  Aquatics     []  Vasocompression     []  Other     Total Treatment time 55        Assessment: [x] Progressing toward goals. PORI breast protocol to L upper quarter. Tissue healed well w/ min discoloration. Decr firmness and tissue restriction lateral breast and incisions, cont's w/ min muscular tightness pec and subscap M's. Rev HEP and completed per log w/ good demo. Fatigues easily w/ strengthening ex. Emphasized pertinent ex to complete daily. To cont to monitor S&S of lymphedema. Will cont bi weekly for manual prn, ROM and ex progression. [] No change. [x] Other: Educated pt on benefits of working in suggested RPE to ensure safe range for exercise during/post chemotherapy/radiation to maximize gains from exercise and to minimize acute and latent side effects of cancer treatment. Patient was educated as to the importance of gradual progression as well as recovery days and issued HO and RPE scale for home.   [x] Patient would continue to benefit from skilled physical therapy services in order to: address the following goals    STG: (to be met in 10 treatments)  1. ? Pain: Stabilize L shoulder/chest/axillary pain and ensure optimal management of pain during all ADL's (home, occupation, community and recreation)  2. Optimize AROM of bilateral shoulders for functional activity. 3. Increase strength in L shoulder and scapula to grossly 5/5 for good postural stability. 4. ? Function: Able to sleep, complete basic ADL's, lift/carry and reach OH w/o difficulty  5. Independent with Home Exercise Programs  6. Education on signs and symptoms, precautions regarding lymphedema. -MET        7.   Educated breast care exercises MET     LTG: (to be met in 20 treatments)  1. Maintain /optimize AROM of bilateral shoulders for functional activity to improve QOL. 2.   Decr BFI score by 1+ for improved narda to activity, incr UEFS by 15% for overall increased function  3. Continue activity to decrease risk factors associated with increased time being sedentary while undergoing chemotherapy, radiation, surgery. 4.   Improve tissue mobility of chest wall and axilla to allow for more normal motion and function  5. Control/mitigate side effects/late effects of Cancer treatment    Pt. Education:  [x] Yes  [] No  [x] Reviewed Prior HEP/Ed  Method of Education: [x] Verbal  [x] Demo  [x] Written  Comprehension of Education:  [x] Verbalizes understanding. [] Demonstrates understanding. [] Needs review. [] Demonstrates/verbalizes HEP/Ed previously given. Plan: [x] Continue current frequency toward long and short term goals.     [x] Specific Instructions for subsequent treatments: cont as narda      Time In: 10:00 am           Time Out: 11:00 am    Electronically signed by:  Vivien Trotter, PT

## 2021-12-14 ENCOUNTER — HOSPITAL ENCOUNTER (OUTPATIENT)
Dept: PHYSICAL THERAPY | Facility: CLINIC | Age: 51
Setting detail: THERAPIES SERIES
Discharge: HOME | End: 2021-12-14
Payer: COMMERCIAL

## 2021-12-14 PROCEDURE — 97110 THERAPEUTIC EXERCISES: CPT

## 2021-12-14 PROCEDURE — 97140 MANUAL THERAPY 1/> REGIONS: CPT

## 2021-12-14 NOTE — FLOWSHEET NOTE
[] [facility]  [address]  [phone]  F: [phone] [] [address]  Kl\[facility] [place] [facility]\"" 36   [address]  P: [phone]  F: [phone] [x] [place]  [address]  P: [phone]  F: [phone] [] [address]  P: [phone]  F: [phone] [] [address]  [facility]   [address]   [place]: [phone]  F: [phone]      Physical Therapy Daily Treatment Note    Date:  2021  Patient Name:  Agatha Borrego    :  1970  MRN: [id number]  Physician: Janine Mccormick MD                               Insurance: [employer]  hard max  Medical Diagnosis: Annetta Bell. eoplasm of left breast, ER+, unspecified site of breast  Rehab Codes: C50.912, Z17.0  Onset date: 21 surg      Next Dr's appt.: 21, ongoing   Visit# / total visits:      Cancels/No Shows: 0    Subjective:  Pt reports she was having some incr tightness under arm last few days, notes she has been doing more at work, wore sleeve a little more and feelng better. No tingling or fullness noted. Wearing sleeve prn, mostly at work and activity. Marisela tamoxifen well so far. Pain:  [x] Yes  [] No Location: L breast, axilla Pain Rating: (0-10 scale) 1/10  Pain altered Tx:  [x] No  [] Yes  Action:  Comments:    Objective:  Modalities:   Precautions:L BrCa, 1+/3 LN. Lumpectomy 21. No chemo, radiation 16 treatments -. Tamoxifen started 10/10/21  Manual: Utilized PORi breast protocol to LEFT upper quarter for gentle manual lymphatic drainage, myofascial release and joint mobility to facilitate better function ROM and dynamics of lymph system.   Exercises: Phase 1: 1-3 RPE (resting to easy) until 3 mo post chemo/rad/immuno ( 12/30/21 progress to phase 2)  Exercise Reps/ Time Weight/ Level Comments             Diaphragmatic breathing 5 cycles       Bye bye  3x   Fwd, side   Wand flexion  10x  HEP     Bye, bye ex 3x   Fwd, abd   Elbow winging supine 1m  HEP               Post shld rolls 10x     Scapular retraction  10x  HEP     wall slides  10x  HEP  flex             pec stretch 3x30     door , varied arm position         SL ABD, ER, HAB 10x     OH flex 10x     Wall angels 10x           UE tband: ext, row, ER, tricep, bicep 10-15x ea lime      Other: numb axilla and post arm     L shoulder AROM supine: flex 149° \"pulling in axilla\" before RX, 160° after RX    *cording from inferior incision to mid bicep, no N&T noted     Treatment Charges: Mins Units   []  Modalities     [x]  Ther Exercise 15 1   [x]  Manual Therapy 40 3   []  Ther Activities     []  Aquatics     []  Vasocompression     []  Other     Total Treatment time 55        Assessment: [x] Progressing toward goals. PORI breast protocol to L upper quarter. Tissue healed well w/ min discoloration in axilla. Decr firmness and tissue restriction lateral breast and incisions, cont's w/ min muscular tightness pec and subscap M's. Maintaining ROM well. Rev HEP and completed per log w/ good demo. Less fatigue w/ strengthening ex. Emphasized pertinent ex to complete daily. To cont to monitor S&S of lymphedema. Will cont bi weekly for manual prn, ROM and ex progression to phase 2. [] No change. [x] Other: Educated pt on benefits of working in suggested RPE to ensure safe range for exercise during/post chemotherapy/radiation to maximize gains from exercise and to minimize acute and latent side effects of cancer treatment. Patient was educated as to the importance of gradual progression as well as recovery days and issued HO and RPE scale for home.   [x] Patient would continue to benefit from skilled physical therapy services in order to: address the following goals    STG: (to be met in 10 treatments)  1. ? Pain: Stabilize L shoulder/chest/axillary pain and ensure optimal management of pain during all ADL's (home, occupation, community and recreation)  2. Optimize AROM of bilateral shoulders for functional activity. 3. Increase strength in L shoulder and scapula to grossly 5/5 for good postural stability. 4. ? Function: Able to sleep, complete basic ADL's, lift/carry and reach OH w/o difficulty  5. Independent with Home Exercise Programs  6. Education on signs and symptoms, precautions regarding lymphedema. -MET        7.   Educated breast care exercises MET     LTG: (to be met in 20 treatments)  1. Maintain /optimize AROM of bilateral shoulders for functional activity to improve QOL. 2.   Decr BFI score by 1+ for improved narda to activity, incr UEFS by 15% for overall increased function  3. Continue activity to decrease risk factors associated with increased time being sedentary while undergoing chemotherapy, radiation, surgery. 4.   Improve tissue mobility of chest wall and axilla to allow for more normal motion and function  5. Control/mitigate side effects/late effects of Cancer treatment    Pt. Education:  [x] Yes  [] No  [x] Reviewed Prior HEP/Ed  Method of Education: [x] Verbal  [x] Demo  [x] Written  Comprehension of Education:  [x] Verbalizes understanding. [] Demonstrates understanding. [] Needs review. [] Demonstrates/verbalizes HEP/Ed previously given. Plan: [x] Continue current frequency toward long and short term goals.     [x] Specific Instructions for subsequent treatments: cont as narda      Time In: 6:00 pm           Time Out: 7:05 pm    Electronically signed by:  Jaimie Jay PT

## 2021-12-22 DIAGNOSIS — C50.412 MALIGNANT NEOPLASM OF UPPER-OUTER QUADRANT OF LEFT BREAST IN FEMALE, ESTROGEN RECEPTOR POSITIVE (HCC): Primary | ICD-10-CM

## 2021-12-22 DIAGNOSIS — Z17.0 MALIGNANT NEOPLASM OF UPPER-OUTER QUADRANT OF LEFT BREAST IN FEMALE, ESTROGEN RECEPTOR POSITIVE (HCC): Primary | ICD-10-CM

## 2021-12-23 ENCOUNTER — TELEPHONE (OUTPATIENT)
Dept: ONCOLOGY | Age: 51
End: 2021-12-23

## 2021-12-23 NOTE — TELEPHONE ENCOUNTER
Assisting Eugenio Stevens RN Navigator. I called to notify that Zaida De Luna is no longer with us in navigation and that our oral compliance nurse will be following her. I also let her know that we received a referral for survivorship. No answer. Left voicemail asking for return call. I left the numbers for [facility], Navigation, and DEVORA Guillermo Oral Compliance.

## 2022-01-03 ENCOUNTER — HOSPITAL ENCOUNTER (OUTPATIENT)
Dept: PHYSICAL THERAPY | Facility: CLINIC | Age: 52
Setting detail: THERAPIES SERIES
Discharge: HOME | End: 2022-01-03
Payer: COMMERCIAL

## 2022-01-03 NOTE — FLOWSHEET NOTE
[] [facility] - [facility] &  Therapy  [address]    [phone]  F: [phone]   [] [address]  [facility]   [address]  P: [phone]  F: [phone]  [] [address] &  Therapy  [address]  P: [phone]  F: [phone] [] [address]  P: [phone]  F: [phone]  [] [address]  [address] [facility] [address]   [address]   [place]: [phone]  F: [phone]   [] [facility]  [address]  [place]: [phone]   F: [phone]     Physical Therapy Cancel/No Show note    Date: 1/3/2022  Patient: Harper Ashwin  : 1970  MRN: [id number]    Cancels/No Shows to date:     For today's appointment patient:    [x]  Cancelled    [x] Rescheduled appointment    [] No-show     Reason given by patient:    []  Patient ill    []  Conflicting appointment    [] No transportation      [] Conflict with work    [] No reason given    [] Weather related    [x] COVID-19    [x] Other:      Comments:    In quarantine       [x] Next appointment was confirmed    Electronically signed by: Bruna Bunn PTA

## 2022-01-05 ENCOUNTER — TELEPHONE (OUTPATIENT)
Dept: ONCOLOGY | Age: 52
End: 2022-01-05

## 2022-01-05 NOTE — TELEPHONE ENCOUNTER
Second attempt to call patient in regard to survivorship referral. No answer. Left voicemail asking for return call.

## 2022-01-06 ENCOUNTER — CLINICAL DOCUMENTATION (OUTPATIENT)
Dept: OCCUPATIONAL THERAPY | Age: 52
End: 2022-01-06

## 2022-01-06 NOTE — DISCHARGE SUMMARY
TREATMENT LOCATION:   [] [facility] [address] [place] [address] [place]: [phone]  F: [phone] [x] [address]: [phone]  F: [phone]     Lymphedema Therapy Discharge Note    Date: 2022      Patient: Brissa Ramsay  : 1970  MRN: [id number]    Referring Physician: Dann St MD       Phone: [phone]                 Fax:   Mannie Hodgkins ([id number] ) -  visits (hard max)  Medical Diagnosis: C50.412, Z17.0  Rehab Codes: I89.0       Total visits attended: 2        Cancels/No shows: 0  Date of initial visit: 21                Date of final visit: 21      Subjective:  Refer to initial evaluation/ treatment notes. Objective:  Refer to initial evaluation/treatment notes. Assessment:  Refer to initial evaluation/ treatment notes. Treatment to Date:  [] Therapeutic Exercise           [x] Instruction in Home Program                       [] Manual Therapy  [x] Self-Care/Home Management  [] Vasopneumatic Pump             [] Other:    Discharge Status:   [] Treatment goals were met. [] Pt received maximum benefit. No further therapy indicated at this time. [] Pt to continue exercise/home instructions independently  [x] Pt has not called or returned to clinic in over 90 days with additional concerns: Pt has been contacted and she indicates no additional concerns at this time. Lymphedema Life Impact Scale score: [] Eval 5%    [x] D/C: unable to collect                  Electronically signed by Haydee Howard OT on 2022 at 1:58 PM    The patient is being discharged due to the status listed above. If patient would like to return to the clinic for additional therapy a new referral will be necessary. Thank you again for your referral and allowing us to assist in the care of this patient! For any questions or concerns, please don't hesitate to call us at [phone].

## 2022-01-07 ENCOUNTER — OFFICE VISIT (OUTPATIENT)
Dept: ONCOLOGY | Age: 52
End: 2022-01-07
Payer: COMMERCIAL

## 2022-01-07 ENCOUNTER — TELEPHONE (OUTPATIENT)
Dept: ONCOLOGY | Age: 52
End: 2022-01-07

## 2022-01-07 VITALS
WEIGHT: 150 LBS | BODY MASS INDEX: 22.48 KG/M2 | DIASTOLIC BLOOD PRESSURE: 72 MMHG | SYSTOLIC BLOOD PRESSURE: 114 MMHG | HEART RATE: 87 BPM | TEMPERATURE: 97 F

## 2022-01-07 DIAGNOSIS — T45.1X5A HOT FLASHES DUE TO TAMOXIFEN: ICD-10-CM

## 2022-01-07 DIAGNOSIS — Z17.0 MALIGNANT NEOPLASM OF UPPER-OUTER QUADRANT OF LEFT BREAST IN FEMALE, ESTROGEN RECEPTOR POSITIVE (HCC): Primary | ICD-10-CM

## 2022-01-07 DIAGNOSIS — D05.02 BREAST NEOPLASM, TIS (LCIS), LEFT: ICD-10-CM

## 2022-01-07 DIAGNOSIS — R23.2 HOT FLASHES DUE TO TAMOXIFEN: ICD-10-CM

## 2022-01-07 DIAGNOSIS — C50.412 MALIGNANT NEOPLASM OF UPPER-OUTER QUADRANT OF LEFT BREAST IN FEMALE, ESTROGEN RECEPTOR POSITIVE (HCC): Primary | ICD-10-CM

## 2022-01-07 DIAGNOSIS — Z80.3 FAMILY HISTORY OF BREAST CANCER: ICD-10-CM

## 2022-01-07 PROCEDURE — 99214 OFFICE O/P EST MOD 30 MIN: CPT | Performed by: INTERNAL MEDICINE

## 2022-01-07 PROCEDURE — 99211 OFF/OP EST MAY X REQ PHY/QHP: CPT | Performed by: INTERNAL MEDICINE

## 2022-01-07 RX ORDER — TAMOXIFEN CITRATE 20 MG/1
20 TABLET ORAL DAILY
Qty: 90 TABLET | Refills: 1 | Status: SHIPPED | OUTPATIENT
Start: 2022-01-07 | End: 2022-09-14

## 2022-01-07 RX ORDER — VITAMIN E 268 MG
400 CAPSULE ORAL DAILY
Qty: 90 CAPSULE | Refills: 1 | Status: SHIPPED | OUTPATIENT
Start: 2022-01-07 | End: 2022-04-21

## 2022-01-07 NOTE — PROGRESS NOTES
Jaime Lucas                                                                                                                  1/7/2022  MRN:   [id number]  YOB: 1970  PCP:                           Arin Shah MD  Referring Physician: No ref. provider found  Treating Physician Name: Karla Ward MD      Reason for visit:  Chief Complaint   Patient presents with    Follow-up     review status of disease    Other     when is her next mammogram   Reviewed results of lab work-up and discuss treatment plan    Current problems:  Invasive lobular carcinoma of left breast, grade 2, ER positive strongly, SD positive moderate, HER-2 not amplified, clinical stage T1c, N0, M0. Pathological stage T1 cN1 aM0  Oncotype recurrence score of 15  Left breast LCIS  Premenopausal status    Active and recent treatments:  Left breast lumpectomy with sentinel lymph node biopsy-7/2021  S/p adjuvant radiation therapy--, completed 9/2021  Tamoxifen--9/2021    Summary of Case/History:    Jaime Lucas a 45 y. o.female is a patient recently diagnosed with left breast lobular invasive cancer presents to the clinic to establish care and for further work-up and evaluation. Patient last mammogram done about 2 years ago was unremarkable but her latest mammogram showed asymmetrical density in the upper outer quadrant at 1230-1 o'clock position. This was further confirmed with ultrasound which showed a 1.7 cm x 1.3 cm mass. Patient underwent biopsy which confirmed a lobular cancer, grade 2. Ultrasound also showed suspicious lymph node however biopsy was negative for malignancy. Patient herself did not notice any palpable mass. Denies any breast pain nipple discharge denies any previous history of biopsy. Patient is premenopausal.    Patient age of menarche was 14 years. Age of first child was 31. Patient has 2 children. Patient is premenopausal.  She is not on birth control or hormonal medication. Does not smoke. 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 Patient has history of breast cancer in her mother at age of 58. Interim History:    Patient presents to the clinic for a follow-up visit and for toxicity check and to review results of her blood work-up. Patient has been tolerating treatment without any unexpected or severe side effects. Denies hospitalization or ER visit. Appetite is good. Weight is stable. Nausea is controlled. Complains of some hot flashes. Patient has not had any more menstrual bleeding now    During this visit patient's allergy, social, medical, surgical history and medications were reviewed and updated.     Past Medical History:   Denies history of hypertension diabetes mellitus    Past Surgical History:     Past Surgical History:   Procedure Laterality Date    BREAST BIOPSY Left 7/20/2021    LEFT BREAST LUMPECTOMY    SENTINEL LYMPH NODE BIOPSY  (@ 07:30 )  WITH FROZEN SECTION performed by Kiana Phillips DO at [address] [place] [address] Left 7/20/2021    LEFT BREAST ONCOPLASTIC RECONSTRUCTION   PECT BLOCK performed by Ra Fox MD at [facility] MRI BREAST BX USING DEVICE LEFT Left 7/1/2021    MRI BREAST BX USING DEVICE LEFT 7/1/2021 [facility]    US BREAST NEEDLE BIOPSY LEFT Left 6/3/2021    US BREAST NEEDLE BIOPSY LEFT 6/3/2021 [address]    US LYMPH NODE BIOPSY  6/3/2021    US LYMPH NODE BIOPSY 6/3/2021 [address]    WISDOM TOOTH EXTRACTION         Patient Family Social History:    Family History   Problem Relation Age of Onset    Other Father         Gout    Breast Cancer Mother 59        ductal carcinoma lumpectomy w/ radiation   [place]Michael Migraines Mother     No Known Problems Brother     Breast Cancer Paternal Grandmother [de-identified]    Heart Attack Maternal Grandfather 76     Social History     Socioeconomic History    Marital status:      Spouse name: None    Number of children: None    Years of education: None    Highest education level: None   Occupational History    None   Tobacco Use    Smoking status: Never Smoker    Smokeless tobacco: Never Used   Substance and Sexual Activity    Alcohol use: No    Drug use: No    Sexual activity: Yes     Partners: Male   Other Topics Concern    None   Social History Narrative    None     Social Determinants of Health     Financial Resource Strain:     Difficulty of Paying Living Expenses: Not on file   Food Insecurity:     Worried About Running Out of Food in the Last Year: Not on file    Whitney of Food in the Last Year: Not on file   Transportation Needs:     Lack of Transportation (Medical): Not on file    Lack of Transportation (Non-Medical): Not on file   Physical Activity:     Days of Exercise per Week: Not on file    Minutes of Exercise per Session: Not on file   Stress:     Feeling of Stress : Not on file   Social Connections:     Frequency of Communication with Friends and Family: Not on file    Frequency of Social Gatherings with Friends and Family: Not on file    Attends [employer] Services: Not on file    Active Member of [address] [place] [address] or Organizations: Not on file    Attends Club or Organization Meetings: Not on file    Marital Status: Not on file   Intimate Partner Violence:     Fear of Current or Ex-Partner: Not on file    Emotionally Abused: Not on file    Physically Abused: Not on file    Sexually Abused: Not on file   Housing Stability:     Unable to Pay for Housing in the Last Year: Not on file    Number of [place] in the Last Year: Not on file    Unstable Housing in the Last Year: Not on file     Current Medications:  Patient does not currently take any prescription medication    Allergies:   Wound dressing adhesive    Review of Systems:    Constitutional: No fever or chills.  No night sweats, no weight loss   Eyes: No eye discharge, double vision, or eye pain   HEENT: negative for sore mouth, sore throat, hoarseness and voice change   Respiratory: negative for cough , sputum, dyspnea, wheezing, hemoptysis, chest pain   Cardiovascular: negative for chest pain, dyspnea, palpitations, orthopnea, PND   Gastrointestinal: negative for nausea, vomiting, diarrhea, constipation, abdominal pain, Dysphagia, hematemesis and hematochezia   Genitourinary: negative for frequency, dysuria, nocturia, urinary incontinence, and hematuria   Integument: negative for rash, skin lesions, bruises.    Hematologic/Lymphatic: negative for easy bruising, bleeding, lymphadenopathy, or petechiae   Endocrine: negative for heat or cold intolerance,weight changes, change in bowel habits and hair loss   Musculoskeletal: negative for myalgias, arthralgias, pain, joint swelling,and bone pain   Neurological: negative for headaches, dizziness, seizures, weakness, numbness        Physical Exam:    Vitals: /72   Pulse 87   Temp 97 °F (36.1 °C) (Temporal)   Wt 150 lb (68 kg)   BMI 22.48 kg/m²   General appearance - well appearing, no in pain or distress  Mental status - AAO X3  Eyes - pupils equal and reactive, extraocular eye movements intact  Mouth - mucous membranes moist, pharynx normal without lesions  Neck - supple, no significant adenopathy  Lymphatics - no palpable lymphadenopathy, no hepatosplenomegaly  Chest - clear to auscultation, no wheezes, rales or rhonchi, symmetric air entry  Heart - normal rate, regular rhythm, normal S1, S2, no murmurs  Abdomen - soft, nontender, nondistended, no masses or organomegaly  Neurological - alert, oriented, normal speech, no focal findings or movement disorder noted  Extremities - peripheral pulses normal, no pedal edema, no clubbing or cyanosis  Skin - normal coloration and turgor, no rashes, no suspicious skin lesions noted       DATA:    Results for orders placed or performed during the hospital encounter of 07/20/21   HCG, SERUM, QUALITATIVE   Result Value Ref Range    hCG Qual NEGATIVE NEGATIVE   Surgical Pathology   Result Value Ref Range Surgical Pathology Report       -- Diagnosis --    1. Left axillary sentinel lymph nodes    -  Positive for metastatic lobular carcinoma (1/3). 2.  Left breast, additional anterior margin:    -  Negative for carcinoma. 3.  Left breast, excisional lumpectomy:    -  Invasive lobular carcinoma, grade 2.    -  Carcinomas 1.2 cm.    -  Previously reported to be ER positive, LA positive and HER-2 not  amplified.    -  Pleomorphic lobular carcinoma in situ is present. -  Margins are free of involvement by carcinoma. WILL Powell  **Electronically Signed Out**         rdd/7/22/2021       Clinical Information  Pre-op Diagnosis:  LEFT BREAST CARCINOMA   Operative Findings:  LEFT AXILLARY SENTINEL NODES; ADDITIONAL ANTERIOR  MARGIN  DOUBLE SUTURE BLACK IS NEW ANTERIOR MARGIN, LONG SINGLE BLACK  IS INFERIOR MARGIN; LEFT BREAST  DOUBLE SHORT BLACK SUTURE IS SUPERIOR  MARGIN, SINGLE LONG BLACK SUTURE IS LATERAL MARGIN, DOUBLE LONG BLACK  SUTURE IS MEDIAL MARGIN, BLUE SUTURE IS DEEP MARGIN  Operation Performe d:  LEFT BREAST LUMPECTOMY SENTINEL LYMPH NODE  BIOPSY WITH FROZEN SECTION AND POSSIBLE NODE DISSECTION, LEFT BREAST  ONCOPLASTIC RECONSTRUCTION WITH OSVALDO INCISIONAL WOUND VAC, PECT BLOCK    Source of Specimen  1: LEFT AXILLARY SENTINEL NODES  2: ADDITIONAL ANTERIOR MARGIN  3: LEFT BREAST    Gross Description  1. \"TERESITA SALGADO, LEFT AXILLARY SENTINEL NODES\" Received three  portions adipose. A fatty node is 1.0 cm, all as FS (AFS) with H&E  TP.  12.0 x 10.0 x 9.0 mm adipose has an 8.0 x 5.0 x 5.0 millimeters  node, all for FS (BFS). Third portion of adipose is 2.0 x 1.2 x 1.2  cm and lacks a grossly discernible node, all submitted for permanent,  \"C.\"  FS x 2, 3cs/NS. 2.  \"TERESITA SALGADO, ADDITIONAL ANTERIOR MARGIN\" 3.1 x 2.1 x 0.7 cm  (M-L x S-I x A-P) oriented portion of fibrofatty tissue. The deep  margin is inked black, superior blue, inferior red and anterior green.    Sectioning reveals fibrotic cut surfaces with no masses. Cassette  summary:  \"A-C\" specimen entirely seria lly submitted from medial to  lateral.  3. \"TERESITA DAMIAN, DOUBLE SHORT BLACK SUTURE IS SUPERIOR MARGIN,  SINGLE LONG BLACK SUTURE IS LATERAL MARGIN, DOUBLE LONG BLACK SUTURE  IS MEDIAL MARGIN, BLUE SUTURE IS DEEP MARGIN\" 7.1 x 6.6 x 2.8 cm (S-I  x M-L x A-P) oriented portion of fibrofatty tissue. The deep margin  is inked black, superior blue, inferior red and anterior green. Sectioning reveals a 1.2 x 1.0 x 0.9 cm ill-defined tan-white mass  lesion surrounded by dense fibrous tissue. Within the lesion is a  biopsy site with a spiral-shaped marker. The lesion is < 0.1 cm from  the anterior margin, 0.5 cm from the deep margin and at least 1.0 cm  from the remaining margins. Dense fibrous tissue extends to the  inferior margin, anterior margin and lateral margin. Cassette summary:   \"A-K\" representative sections sequentially submitted from medial to  lateral with the medial and lateral margins submitted as shaves and  the remaining margins perpendicular. Region of the lesion is i n  \"B-I. \"  All six margins are represented. tm    Intraoperative Diagnosis  FSDX:  Lymph nodes (X2), negative for carcinoma.  (RDD, 11:02)Note: A  small focus of infiltrating lobular carcinoma is present in one lymph  node present in tissue not sampled by frozen section (not present on  the frozen sections). Microscopic Description  1-3.      INVASIVE BREAST CARCINOMA         PROCEDURE: Lumpectomy, sentinel node biopsies                                      SPECIMEN LATERALITY & TUMOR SITE: Left breast, prior biopsy  designated 1230                   TUMOR SIZE (LARGEST INVASIVE COMPONENT): 1.2 x 1.0 x 0.9 cm       HISTOLOGIC TYPE OF INVASIVE CARCINOMA:       HISTOLOGIC GRADE (PUNEET)--       - GLANDULAR/TUBULAR DIFFERENTIATION SCORE: 3       - NUCLEAR PLEOMORPHISM SCORE: 2       - MITOTIC RATE SCORE: 1       - OVERALL GRADE (FROM PUNEET TOTAL SCORE): Grade 2 (of 3). Note: Part 3 shows pleomorphic lobular carcinoma in situ associated  with the invasive lobular carcinoma. Part 2 shows a focus of atypical  lobular hyperplasia. The case is discussed with Dr. Lexie Lobo. DUCTAL CARCINOMA IN SITU: Absent       TUMOR EXTENT       - SKIN (INVASION, SATELLITE FOCI): Not sampled       - NIPPLE: Not sampled       - SKELETAL MUSCLE: Not sampled         MARGINS - INVASIVE CARCINOMA -  - SITE(S) OF ALL POSITIVE MARGINS (SPECIFY EXTENT): Margins are free  of involvement by invasive carcinoma  - IF ALL NEGATIVE--  --SITE(S) AND DISTANCE TO CLOSEST:Invasive carcinoma: Closest margin  is 5 mm, distance to the deep margin. Next closest margin is 7 mm,  distance to the inferior margin. Anterior aspect in part 3 is free  but narrow at <1 mm, but >10 mm distant considering part 2 additional  anterior margin tissue. Pleomorphic lobular carcinoma in situ: All  margins are free of pleomorphic lobular carcinoma in situ, all >10 mm  distant. LYMPHOVASCULAR INVASION: Absent         REGIONAL LYMPH NODES -       - NUMBER EXAMINED (SENTINEL AND NON-SENTINEL): 3       - NUMB ER OF SENTINEL: 3 (a third node is found in the adipose tissue  not sampled by frozen section, cassette C, on permanent sections. Focus of metastatic carcinoma is present in this third node). - NUMBER WITH MACROMETASTASES: 1       - NUMBER WITH MICROMETASTASES:      0  - NUMBER WITH ISOLATED TUMOR CELLS: 0       - SIZE OF LARGEST METASTATIC DEPOSIT: Subcapsular focus with 4 mm  largest linear dimension       - EXTRANODAL EXTENSION: AbsentNote: Lymph nodes are evaluated through  levels and by control appropriate immunostaining with pancytokeratin.         TREATMENT EFFECT: RESPONSE TO PRE-SURGICAL  (NEOADJUVANT) THERAPY (IF APPLICABLE)--       - IN BREAST TISSUES: N/A       - IN LYMPH NODES:.  N/A         DISTANT METASTASIS pM  (only if confirmed pathologically in this case): N/A    PATHOLOGIC STAGE CLASSIFICATION (pTNM):     pT1c  pN1a  (T and /or N descriptors used only if applicable)    [facility] InvasiveBreast 4500 in conjunction with AJCC 8 ed. BIOMARKER  TESTING  BREAST CARCINOMA         ESTROGEN RECEPTOR*--   Previously determined to be positive (see ES , left breast  1230, 6/3/2021). PROGESTERONE RECEPTOR*--  Previously determined to be positive. HER2*--  HER2 PROTEIN EXPRESSION (IMMUNOHISTOCHEMISTRY):     N/A  HER2 GENE AMPLIFICATION (INSITU HYBRIDIZATION):     Previously  determined to be not amplified by FISH         COLD ISCHEMIA TIME (TARGET UP TO 60 MIN): Adequate  FIXATION TIME (TARGET 6-72 HRS): Adequate    *Information on biomarker regents:  Estrogen Receptor: If performed at [place] [address] laboratory: Immunostain  clone SP1 with a polymer based detection system, vendor [employer] (FDA cleared); evaluated by manual morphometry  (negative=less than 1% tumor cell nuclear staining; otherwise  positive); external control is reactive. Progesterone Receptor: If performed at [place] [address] laboratory: Immunostain  clone 1E2 with a polymer based detection system, vendor [employer] (FDA cleared);  evaluated by manual morphometry  (negative=less than 1% tumor cell nuclear staining; otherwise  positive); external control is reactive. ER/OH testing of decalcified specimens has not been validated. HER 2 insitu hybridization: FDA cleared, vendor [facility] [employer]. [facility] BreastBiomarkers 1410 in conjunction with AJCC 8 ed. SURGICAL PATHOLOGY CONSULTATION       Patient Name: Santosh Moore Med Rec: [id number]  Path Number: [id number]    [facility]  [facility]  ANATOMIC PATHOLOGY  [address] [place] [address].   [place], [address]  [phone]  Fax: [phone] LYMPHOSCINTIGRAM    Result Date: 7/20/2021  EXAMINATION: LYMPHOSCINTIGRAPHY (INJECTION AND IMAGES) 7/20/2021 7:49 am TECHNIQUE: Sulfur colloid labeled with 600 mCi of Tc99 was provided to Dr. Abbi Irby for sentinel lymph node localization. After obtaining informed consent and performing a time-out using [facility] protocol, 4 equal injections for a total of 600 microcuries of technetium pertechnetate was injected subcutaneously into the 4 poles about the left nipple in preparation for left sentinel node biopsy. Post-injection lymphoscintigraphy demonstrated migration of the radioisotope into the left axillary region  Patient was then taken to the OR for probe guided lymph node localization after injection. Poppy Rea HISTORY: ORDERING SYSTEM PROVIDED HISTORY: Malignant neoplasm of left female breast, unspecified estrogen receptor status, unspecified site of breast [facility]) TECHNOLOGIST PROVIDED HISTORY: Is the patient pregnant?->No Reason for Exam: Left Breast CA Acuity: Unknown Type of Exam: Unknown     Lymph nodes localized with probe in the OR. Impression:  Invasive lobular carcinoma of left breast, grade 2, ER positive strongly, UT positive moderate, HER-2 not amplified, clinical stage T1c, N0, M0. Pathological stage T1 cN1 aM0  Recurrence score of 15  Left breast LCIS  Axillary lymph node cortical thickening  Premenopausal status    Plan:  I had a detailed discussion with the patient and personally went over results of lab work-up imaging studies and other relevant clinical data  Hormonal testing suggests premenopausal status  Toxicity check performed. Patient is overall tolerating tamoxifen without any unexpected or severe side effects does complain of some hot flashes which are manageable. I will call in vitamin D and magnesium supplements  Reiterated potential side effects  Plan to treat patient for 5 years  Reviewed potential side effects including risk of endometrial cancer as well as venous thromboembolism.   Recommend annual screening mammogram. Will obtain mammogram before next office visit  Recommend annual pelvic exam  NCCN guidelines were reviewed and discussed with the patient. The diagnosis and care plan were discussed with the patient in detail. I discussed the natural history of the disease, prognosis, risks and goals of therapy and answered all the patients questions to the best of my ability. Patient expressed understanding and was in agreement. Marcial Boo MD          This note is created with the assistance of a speech recognition program.  While intending to generate a document that actually reflects the content of the visit, the document can still have some errors including those of syntax and sound a like substitutions which may escape proof reading. It such instances, actual meaning can be extrapolated by contextual diversion.

## 2022-01-07 NOTE — TELEPHONE ENCOUNTER
AVS from 1/7/22    rv in 4 months with labs and mammogram prior    RV scheduled 5/11/22 @ 1:30pm    PT to schedule mamm prior to RV     PT was given orders, scheduling instructions, AVS and an appt schedule    Electronically signed by Daniele Saldivar on 1/7/2022 at 3:53 PM

## 2022-01-10 ENCOUNTER — HOSPITAL ENCOUNTER (OUTPATIENT)
Dept: PHYSICAL THERAPY | Facility: CLINIC | Age: 52
Setting detail: THERAPIES SERIES
Discharge: HOME | End: 2022-01-10
Payer: COMMERCIAL

## 2022-01-10 PROCEDURE — 97140 MANUAL THERAPY 1/> REGIONS: CPT

## 2022-01-10 PROCEDURE — 97110 THERAPEUTIC EXERCISES: CPT

## 2022-01-10 NOTE — FLOWSHEET NOTE
[] [facility] [address] [facility]  [address]  [phone]  F: [phone] [] [address]  [facility]   [address]  P: [phone]  F: [phone] [x] [place]  [address]  P: [phone]  F: [phone] [] [address]  P: [phone]  F: [phone] [] [address]  [facility]   [address]   [place]: [phone]  F: [phone]      Physical Therapy Daily Treatment Note    Date:  1/10/2022  Patient Name:  Ventura Costa    :  1970  MRN: [id number]  Physician: Oswald Kim MD                               Insurance: [facility]  hard max  Medical Diagnosis: [place]. eoplasm of left breast, ER+, unspecified site of breast  Rehab Codes: C50.912, Z17.0  Onset date: 21 surg      Next Dr's appt.: 21, ongoing   Visit# / total visits:      Cancels/No Shows: 10    Subjective:  Pt reports she was down sick around [place] time and took it easy, has been able to resume HEP and ex/swimming at [facility]. No sig tightness and notes no fullness, wearing sleeve prn. Marisela tamoxifen well so far- hot flashes. Pain:  [] Yes  [x] No Location: L breast, axilla Pain Rating: (0-10 scale) /10 \"tight\"  Pain altered Tx:  [x] No  [] Yes  Action:  Comments:    Objective:  Modalities:   Precautions: L BrCa, 1+/3 LN. Lumpectomy 21. No chemo, radiation 16 treatments -. Tamoxifen started 10/10/21  Manual: Utilized PORi breast protocol to LEFT upper quarter for gentle manual lymphatic drainage, myofascial release and joint mobility to facilitate better function ROM and dynamics of lymph system.   Exercises: Phase 2: 4-6 RPE (moderate to hard) until 6 mo post chemo/rad/immuno ( 3/30/22 progress to phase 3)  Exercise Reps/ Time Weight/ Level Comments             Diaphragmatic breathing 5 cycles       Bye bye  3x   Fwd, side   Wand flexion  10x  HEP     Bye, bye ex 3x   Fwd, abd   Elbow winging supine 1m  HEP               Post shld rolls 10x     Scapular retraction  10x  HEP     wall slides  10x  HEP  flex             pec stretch 3x30     door, varied arm position         SL ABD, ER, HAB 10x     OH flex 10x     Wall angels 10x           UE tband: ext, row, ER, tricep, bicep 15x ea lime Issued blue for HEP   Band on wall 10x ea lime Lateral, diagonal           Other: numb axilla and post arm. Walking regularly, is [employer] member and using gym occ/weights and swimming 3-4 days a week. L shoulder AROM supine: flex 149° \"pulling in axilla\" before RX, 155° after RX    *cording from inferior incision to mid bicep, no N&T noted     Treatment Charges: Mins Units   []  Modalities     [x]  Ther Exercise 30 2   [x]  Manual Therapy 20 1   []  Ther Activities     []  Aquatics     []  Vasocompression     []  Other     Total Treatment time 50        Assessment: [x] Progressing toward goals. Partial PORI breast protocol to L upper quarter. Min muscular tightness pec and subscap M's. Maintaining ROM well. Rev HEP and completed ex per log w/ added scap stabilizing tband ex on wall. New ex challenging but narda well. Issued blue tband to progress to, HO and lime band for new ex. Emphasized pertinent ex to complete daily and to cont to monitor S&S of lymphedema. Will cont bi weekly/monthly for manual prn, ROM and ex progression to phase 3. [] No change. [x] Other: Educated pt on benefits of working in suggested RPE to ensure safe range for exercise during/post chemotherapy/radiation to maximize gains from exercise and to minimize acute and latent side effects of cancer treatment. Patient was educated as to the importance of gradual progression as well as recovery days and issued HO and RPE scale for home.   [x] Patient would continue to benefit from skilled physical therapy services in order to: address the following goals    STG: (to be met in 10 treatments)  1. ? Pain: Stabilize L shoulder/chest/axillary pain and ensure optimal management of pain during all ADL's (home, occupation, community and recreation)- MET  2. Optimize AROM of bilateral shoulders for functional activity. 3. Increase strength in L shoulder and scapula to grossly 5/5 for good postural stability. 4. ? Function: Able to sleep, complete basic ADL's, lift/carry and reach [place] w/o difficulty  5. Independent with Home Exercise Programs  6. Education on signs and symptoms, precautions regarding lymphedema. -MET        7.   Educated breast care exercises MET     LTG: (to be met in 20 treatments)  1. Maintain /optimize AROM of bilateral shoulders for functional activity to improve QOL. 2.   Decr BFI score by 1+ for improved narda to activity, incr UEFS by 15% for overall increased function  3. Continue activity to decrease risk factors associated with increased time being sedentary while undergoing chemotherapy, radiation, surgery. 4.   Improve tissue mobility of chest wall and axilla to allow for more normal motion and function  5. Control/mitigate side effects/late effects of Cancer treatment    Pt. Education:  [x] Yes  [] No  [x] Reviewed Prior HEP/Ed  Method of Education: [x] Verbal  [x] Demo  [x] Written  Comprehension of Education:  [x] Verbalizes understanding. [] Demonstrates understanding. [] Needs review. [] Demonstrates/verbalizes HEP/Ed previously given. Plan: [x] Continue current frequency toward long and short term goals.     [x] Specific Instructions for subsequent treatments: cont as narda      Time In: 4:30 pm           Time Out: 5:20 pm    Electronically signed by:  Lebron Rosado, PT

## 2022-01-13 ENCOUNTER — TELEPHONE (OUTPATIENT)
Dept: ONCOLOGY | Age: 52
End: 2022-01-13

## 2022-01-13 NOTE — TELEPHONE ENCOUNTER
Third attempt to call patient in regard to survivorship referral. No answer. Left voicemail asking for return call. Will place on declined list unless patient calls back to schedule visit. Mailed letter.

## 2022-01-13 NOTE — LETTER
[address] [place] [address]   Jen Conner, [facility] [address]   Office: [phone]  Fax: [address] [place] [phone]          1/13/22      Dear Ms. Mary Clifton,     We are contacting you because your healthcare provider referred you for the Survivorship program. We have been unable to reach you to schedule an appointment. If you are interested in scheduling an appointment or have additional questions, please contact me at [phone] or my assistant, Sarabjit English, at [phone]. Sincerely,     Conor Brown, MSN, APRN, NP-C  [facility]   Jen Conner, [facility]  P: [phone]  F: [phone]  [email or web address]

## 2022-01-19 ENCOUNTER — TELEPHONE (OUTPATIENT)
Dept: INFUSION THERAPY | Age: 52
End: 2022-01-19

## 2022-01-31 ENCOUNTER — HOSPITAL ENCOUNTER (OUTPATIENT)
Dept: PHYSICAL THERAPY | Facility: CLINIC | Age: 52
Setting detail: THERAPIES SERIES
Discharge: HOME | End: 2022-01-31
Payer: COMMERCIAL

## 2022-01-31 PROCEDURE — 97140 MANUAL THERAPY 1/> REGIONS: CPT

## 2022-01-31 PROCEDURE — 97110 THERAPEUTIC EXERCISES: CPT

## 2022-01-31 NOTE — FLOWSHEET NOTE
[] [facility] [address]  [address]  [phone]  F: [phone] [] [address]  [place] [address]   [address]  P: [phone]  F: [phone] [x] [place]  [address]  P: [phone]  F: [phone] [] [address]  P: [phone]  F: [phone] [] [address]  [facility]   [address]   [place]: [phone]  F: [phone]      Physical Therapy Daily Treatment Note    Date:  2022  Patient Name:  Vera Steven    :  1970  MRN: [id number]  Physician: Deepti Estes MD                               Insurance: [facility]  hard max  Medical Diagnosis: [place]. eoplasm of left breast, ER+, unspecified site of breast  Rehab Codes: C50.912, Z17.0  Onset date: 21 surg      Next Dr's appt.: ongoing   Visit# / total visits:      Cancels/No Shows: 1    Subjective:  Pt reports she was a little sore after wearing a tighter sports bra but overall has been feeling good. No fullness, tightness or N&T into arm, wearing sleeve prn. States compliant w/ HEP and ex/swimming at [facility] ~4 days a week w/ good marisela. Notes not having any difficulty w/ anything just occ tightness. Marisela tamoxifen well so far- min hot flashes. Pain:  [] Yes  [x] No Location: L breast, axilla Pain Rating: (0-10 scale) 0/10   Pain altered Tx:  [x] No  [] Yes  Action:  Comments:    Objective:  Modalities:   Precautions: L BrCa, 1+/3 LN. Lumpectomy 21. No chemo, radiation 16 treatments -.  Tamoxifen started 10/10/21  Manual: Utilized PORi breast protocol to LEFT upper quarter for gentle manual lymphatic drainage, myofascial release and joint mobility to facilitate better function ROM and dynamics of lymph system. Exercises: Phase 2: 4-6 RPE (moderate to hard) until 6 mo post chemo/rad/immuno ( 3/30/22 progress to phase 3)  Exercise Reps/ Time Weight/ Level Comments             Diaphragmatic breathing 5 cycles       Bye bye  3x   Fwd, side   Wand flexion  10x  HEP     Elbow winging supine 1m  HEP               Post shld rolls 10x     Scapular retraction  10x  HEP     wall slides  10x    flex             pec stretch 3x30     door, varied arm position         SL ABD, ER, HAB 10x HEP    OH flex 10x     Wall angels 10x           UE tband: ext, row, ER, tricep, bicep 15-20x ea lime Issued blue for HEP   Band on wall 10x ea lime Lateral, diagonal           Other: numb axilla and post arm. Walking regularly, is [employer] member and using gym occ/weights and swimming 3-4 days a week. L shoulder AROM supine: flex 148° \"pulling in axilla\" before RX, 154° after RX      Treatment Charges: Mins Units   []  Modalities     [x]  Ther Exercise 30 2   [x]  Manual Therapy 20 1   []  Ther Activities     []  Aquatics     []  Vasocompression     []  Other     Total Treatment time 50        Assessment: [x] Progressing toward goals. Partial PORI breast protocol to L upper quarter. Min muscular tightness pec and subscap M's. Maintaining ROM well. Rev HEP and completed ex per log w/ emphasis on pushing nto end ROM. Emphasized pertinent ex to complete daily and to cont to monitor S&S of lymphedema. Will cont monthly for manual prn, ROM and HEP/ex progression to phase 3. Plan to cont 1-2 more visits then discharge to [facility] HEP. [] No change. [x] Other: Educated pt on benefits of working in suggested RPE to ensure safe range for exercise during/post chemotherapy/radiation to maximize gains from exercise and to minimize acute and latent side effects of cancer treatment. Patient was educated as to the importance of gradual progression as well as recovery days and issued HO and RPE scale for home.   [x] Patient would continue to benefit from skilled physical therapy services in order to: address the following goals    STG: (to be met in 10 treatments)  1. ? Pain: Stabilize L shoulder/chest/axillary pain and ensure optimal management of pain during all ADL's (home, occupation, community and recreation)- MET  2. Optimize AROM of bilateral shoulders for functional activity. 3. Increase strength in L shoulder and scapula to grossly 5/5 for good postural stability. 4. ? Function: Able to sleep, complete basic ADL's, lift/carry and reach [place] w/o difficulty- MET  5. Independent with Home Exercise Programs  6. Education on signs and symptoms, precautions regarding lymphedema. -MET        7.   Educated breast care exercises MET     LTG: (to be met in 20 treatments)  1. Maintain /optimize AROM of bilateral shoulders for functional activity to improve QOL. 2.   Decr BFI score by 1+ for improved narda to activity, incr UEFS by 15% for overall increased function  3. Continue activity to decrease risk factors associated with increased time being sedentary while undergoing chemotherapy, radiation, surgery.- Ongoing  4. Improve tissue mobility of chest wall and axilla to allow for more normal motion and function  5. Control/mitigate side effects/late effects of Cancer treatment- Ongoing    Pt. Education:  [x] Yes  [] No  [x] Reviewed Prior HEP/Ed  Method of Education: [x] Verbal  [x] Demo  [x] Written  Comprehension of Education:  [x] Verbalizes understanding. [] Demonstrates understanding. [] Needs review. [] Demonstrates/verbalizes HEP/Ed previously given. Plan: [x] Continue current frequency toward long and short term goals.     [x] Specific Instructions for subsequent treatments: cont as narda      Time In: 10:00 am           Time Out: 10:50 am    Electronically signed by:  Lopez Joy PT

## 2022-02-01 NOTE — PROGRESS NOTES
[address] [place] [address].  Phone: [phone]  Fax: [phone]       Patient name:           Jaimie Tran  MRN:   [id number]  YOB: 1970  PCP:                           Marion Coronado MD  Oncologist:                  Dr. Douglas Lundberg MD  Radiation Onc.:           Dr. Marcelo Wise MD   General surgeon:        Dr. Sahra Corbett DO               Plastic surgeon:          Dr. Ra Fox MD             Reason for Visit:   Jaimie Tran presents alone for survivorship visit upon completion of treatment for breast cancer.     Cancer Diagnosis/Stage/Genetic Testing/Oncotype DX:  · Invasive lobular carcinoma of left breast (6/3/2021)  Cancer Staging  Staging form: Breast, AJCC 8th Edition  - Clinical stage from 6/3/2021: Stage IA (cT1c, cN0, cM0, G2, ER+, DE+, HER2-) - Signed by Debbie Shaver MD on 6/21/2021  - Pathologic stage from 7/20/2021: Stage IA (pT1c, pN1a(sn), cM0, G2, ER+, DE+, HER2-) - Signed by Debbie Shaver MD on 8/19/2021  · [employer] CancerNext-Expanded Panel + RNAinsight:   · NEGATIVE - NO CLINICALLY SIGNIFICANT MUTATIONS DETECTED :  · Oncotype DX Breast Recurrence Score Report   · Recurrence Score (RS) Result: 15  · Distant Recurrence Risk at 9 years: 4%  · Group Average Absolute Chemotherapy (CT) Benefit:  <1%    Oncologic Treatment(s):   Left breast lumpectomy with sentinel lymph node biopsy (7/20/2021)  RADIATION    Body area treated:   Left breast  Treatment Technique: 3D-CRT   Total dose delivered 4,256 cGy   Total treatments 16/16 fractions   Dates of treatment Start Date: 9/9/2021      Stop Date: 9/30/2021   · Tamoxifen (September 2021)    Summary of Case/History:   Jaimie Tran is a very pleasant 45 y.o. y/o female who has a history of invasive lobular carcinoma of left breast. Her last mammogram was done 4/2019 and was unremarkable but her latest mammogram done 5/13/2021 showed asymmetrical density in the left breast in the upper outer quadrant at 1230-1 o'clock position. This was further confirmed with ultrasound which showed a 1.7 cm x 1.3 cm mass. She then underwent biopsy on 6/3/2021 which confirmed invasive lobular carcinoma, grade 2 ER strongly positive at >90% and AL moderately to strongly positive at 50%, HER 2 negative. Ultrasound also showed suspicious lymph node however biopsy was negative for malignancy. Based on these results she was then referred to surgery and oncology for further evaluation and treatment. Further staging work-up with MRI bilateral breasts done on 6/18/2021 revealed known malignancy at 12 o'clock left breast and also a small 5.2 mm focus of enhancement at 4-5 o'clock position with consideration of MRI guided biopsy recommended. No evidence of malignancy or abnormal enhancement in either axila or internal mammary chain. This was followed by MRI-guided left breast biopsy of at the 4-5 o'clock lesion in which final pathology was negative for atypia or malignancy. On 7/20/2021 she underwent a left breast lumpectomy with sentinel lymph node biopsy and left breast oncoplastic reconstruction. Final pathology revealed invasive lobular carcinoma, grade 2, 1.2 cm with pleomorphic lobular carcinoma in situ present, clear margins. 1/3 left axillary lymph nodes positive for metastatic lobular carcinoma. Final pathological stage of Stage IA (pT1c, pN1a(sn), cM0, G2, ER+, AL+, HER2-). Oncotype DX testing was performed and revealed a recurrence score of 15 reflecting a 4% chance of distant recurrence at 9 years with <1% chemotherapy benefit. During post surgical follow up with Dr. Lashonda Mejia on 8/18/2021 a further treatment plan was discussed and noted \"Reviewed pathology report from surgery. Patient has been upstaged due to involvement of lymph node. Patient continues to be premenopausal. Recurrence score is 15.   Reviewed risk of recurrence with and without chemotherapy. There is no significant risk reduction with addition of chemotherapy based on the recurrence score of 15 in premenopausal woman would recommend against any adjuvant chemotherapy. Discussed natural history of breast cancer. Patient will need adjuvant hormonal therapy, tamoxifen. Agree with planning for adjuvant radiation therapy. \"    She then underwent radiation therapy via 3D-CRT to the left breast for a total dose delivered of 4,256 cGy over 16 fractions from 9/9/2021-9/30/2021. \"Patient completed the treatment as prescribed and tolerated treatment as expected. Patient developed some skin irritation and fatigue. \"    During  follow-up with Dr. Rosas Asp discuss further treatment plan it was noted \"Hormonal testing suggests premenopausal statusPatient is overall tolerating tamoxifen without any unexpected or severe side effects does complain of some hot flashes which are manageable. I will call in vitamin D and magnesium supplements. Reiterated potential side effects. Plan to treat patient for 5 years. Reviewed potential side effects including risk of endometrial cancer as well as venous thromboembolism. Recommend annual screening mammogram. Will obtain mammogram before next office visit. Recommend annual pelvic exam.\"    She began Tamoxifen at the completion of her radiation treatment in late September 2021. Her last pelvic exam was 4/2021 and she is aware she is to have yearly exams due to her increased risk of endometrial cancer as a side effect of the Tamoxifen. Last PAP with high risk HPV negative 4/10/2019. Due for repeat PAP by 4/2024. Last menstrual cycles was August 2021. She continues to follow with oncology and surgery and is also following with oncology rehab. She follows regularly with her PCP and is up to date on colorectal cancer screening with negative FIT test 11/2021.       Treatment related side effects to date include mild hot flashes, breast skin changes and decreased range of motion to left shoulder. She states her hot flashes are tolerable so she has not yet began the vitamin E or magnesium Dr. Delmis Rendon prescribed. She admits to some skin discoloration/darkening to the radiated area of her breast. She also has some decreased range of motion to her left shoulder but this has improved significantly since working with onc rehab. She also remains very active and swim and/or jogs almost daily. She works full time as a nurses aid. She denies any needs during today's visit. Past Medical History:    has a past medical history of Breast cancer ([facility] [address] Past Surgical History:    has a past surgical history that includes [address] LOC DEVICE 1ST LESION LEFT (Left, 6/3/2021); US BIOPSY LYMPH NODE (6/3/2021); MRI BIOPSY BREAST W LOC DEVICE LEFT 1ST LESION (Left, 7/1/2021); [place] tooth extraction; Breast biopsy (Left, 7/20/2021); and Breast reconstruction (Left, 7/20/2021). Allergies: Allergies   Allergen Reactions    Wound Dressing Adhesive Rash     Steri strips         Current Medications: has a current medication list which includes the following prescription(s): jublia, tamoxifen, vitamin e, magnesium, and therapeutic multivitamin-minerals. Family History:  Family History   Problem Relation Age of Onset    Other Father         Gout    Breast Cancer Mother 59        ductal carcinoma lumpectomy w/ radiation   Neves Migraines Mother     No Known Problems Brother     Breast Cancer Paternal Grandmother [de-identified]    Heart Attack Maternal Grandfather 76       Social History:   reports that she has never smoked. She has never used smokeless tobacco. She reports that she does not drink alcohol and does not use drugs. Review of Systems   Constitutional: Negative for chills, diaphoresis, fatigue, fever and unexpected weight change. HENT: Negative for hearing loss, mouth sores, tinnitus and trouble swallowing. Eyes: Negative for visual disturbance.    Respiratory: Negative for apnea, cough and shortness of breath. Cardiovascular: Negative for chest pain and leg swelling. Gastrointestinal: Negative for abdominal pain, constipation, diarrhea, nausea and vomiting. Endocrine: Positive for heat intolerance. Negative for cold intolerance. Genitourinary: Negative for difficulty urinating, dysuria, hematuria, pelvic pain and vaginal bleeding. Musculoskeletal: Negative for arthralgias, back pain and gait problem. Skin: Negative for color change, rash and wound. Neurological: Positive for numbness. Negative for dizziness, speech difficulty, weakness, light-headedness and headaches. Hematological: Negative for adenopathy. Psychiatric/Behavioral: Negative for confusion, decreased concentration and suicidal ideas. OBJECTIVE:  Vital Signs:/65   Pulse 64   Resp 16   Wt 146 lb 8 oz (66.5 kg)   LMP 08/16/2021 (Within Weeks)   SpO2 98%   BMI 21.63 kg/m²     Physical Exam  Vitals reviewed. Constitutional:       General: She is not in acute distress. Appearance: Normal appearance. She is not ill-appearing. HENT:      Head: Normocephalic and atraumatic. Right Ear: Tympanic membrane normal.      Left Ear: Tympanic membrane normal.   Eyes:      Extraocular Movements: Extraocular movements intact. Conjunctiva/sclera: Conjunctivae normal.      Pupils: Pupils are equal, round, and reactive to light. Neck:      Vascular: No carotid bruit. Cardiovascular:      Rate and Rhythm: Normal rate and regular rhythm. Pulses: Normal pulses. Heart sounds: Normal heart sounds. Pulmonary:      Effort: No respiratory distress. Breath sounds: Normal breath sounds. Chest:   Breasts:      Right: No supraclavicular adenopathy. Left: No supraclavicular adenopathy. Abdominal:      General: Abdomen is flat. Bowel sounds are normal.      Palpations: Abdomen is soft. Musculoskeletal:      Cervical back: Normal range of motion and neck supple. Right lower leg: No edema. Left lower leg: No edema. Comments: Mild restrictions to ROM to left shoulder   Lymphadenopathy:      Cervical: No cervical adenopathy. Upper Body:      Right upper body: No supraclavicular adenopathy. Left upper body: No supraclavicular adenopathy. Skin:     General: Skin is warm and dry. Capillary Refill: Capillary refill takes less than 2 seconds. Neurological:      General: No focal deficit present. Mental Status: She is alert and oriented to person, place, and time. Motor: No weakness. Coordination: Coordination is intact. Gait: Gait is intact. Psychiatric:         Mood and Affect: Mood and affect normal.         Speech: Speech normal.         Behavior: Behavior normal. Behavior is cooperative. Thought Content: Thought content normal.         Cognition and Memory: Cognition normal.         Judgment: Judgment normal.         DATA     5/13/2021 Bilateral Screening Mammogram   Nausea is controlled. Complains of some hot flashes. Patient has not had any more menstrual bleeding now    5/26/2021 Diagnostic Left Mammogram  Impression   1.  Suspicious spiculated mass measuring 1.7 cm in the 12 o'clock left breast   is demonstrated.  Ultrasound-guided biopsy of this mass is recommended.       2. Left axillary lymph node with focal cortical thickening, for which   ultrasound-guided biopsy is recommended.       I discussed these findings and recommendations with the patient in person at   the time of imaging.       BI-RADS 5       BIRADS:   BIRADS - CATEGORY 5       Highly Suggestive for Malignancy.  Biopsy should be considered at this time.       OVERALL ASSESSMENT - HIGHLY SUGGESTIVE OF MALIGNANCY.     6/3/2021 ULTRASOUND-GUIDED LEFT BREAST BIOPSY performed at [facility])    Final Pathologic Diagnosis:  SPECIMEN \"A\":  BREAST, LEFT, 12:30 O'CLOCK, ULTRASOUND-GUIDED BIOPSY:          INVASIVE LOBULAR CARCINOMA, GRADE 2     LOBULAR CARCINOMA IN SITU (LCIS) PRESENT     ER POSITIVE, >90%, STRONG     WI POSITIVE, 50%, MODERATE TO STRONG     NEGATIVE FOR HER2 (ERBB2) GENE AMPLIFICATION BY FISH     HER2:  CEP (D17Z1) RATIO, 1.1    SPECIMEN \"B\":  LYMPH NODE, LEFT AXILLARY, ULTRASOUND-GUIDED BIOPSY:          BENIGN LYMPHOID TISSUE, NO METASTATIC CARCINOMA SEEN    6/18/2021 MRI Bilateral Breasts  Impression   1.  16 x 18 x 13 mm masslike lesion 12 o'clock left breast 4.5 cm deep to the   nipple consistent with biopsy proven carcinoma.       2.  Small 5.2 mm focus of contrast enhancement left breast lower outer   location 4-5 o'clock 3.8 cm from the nipple with second-look ultrasound   advised.  If lesion can be filed under ultrasound, tissue sampling would be   advised.  Otherwise, consideration should be given to MRI guided biopsy.       3.  No MRI evidence of malignancy right breast.       BI-RADS 6       7/1/2021 MRI-GUIDED LEFT BREAST BIOPY performed at [facility])    Final Pathologic Diagnosis:  Left breast, 4-5:00, biopsy:          Organized fat necrosis (fibrosis). Negative for atypia and malignancy. 7/20/2021 LEFT BREAST LUMPECTOMY WITH SENTINEL LYMPH NODE BIOPSY performed by Dr. Mercedes Petit DO and LEFT BREAST ONCOPLASTIC RECONSTRUCTION performed by Dr. Fernando Brennan MD at [facility] ([facility])    Final Pathologic Diagnosis:  1. Left axillary sentinel lymph nodes     -  Positive for metastatic lobular carcinoma (1/3). 2.  Left breast, additional anterior margin:     -  Negative for carcinoma. 3.  Left breast, excisional lumpectomy:     -  Invasive lobular carcinoma, grade 2.   -  Carcinomas 1.2 cm.     -  Previously reported to be ER positive, WI positive and HER-2 not amplified.     -  Pleomorphic lobular carcinoma in situ is present. -  Margins are free of involvement by carcinoma. Microscopic Description:  1-3.      INVASIVE BREAST CARCINOMA         PROCEDURE: Lumpectomy, sentinel node biopsies SPECIMEN LATERALITY & TUMOR SITE: Left breast, prior biopsy designated 1230                   TUMOR SIZE (LARGEST INVASIVE COMPONENT): 1.2 x 1.0 x 0.9 cm       HISTOLOGIC TYPE OF INVASIVE CARCINOMA:       HISTOLOGIC GRADE (PUNEET)--       - GLANDULAR/TUBULAR DIFFERENTIATION SCORE: 3       - NUCLEAR PLEOMORPHISM SCORE: 2       - MITOTIC RATE SCORE: 1       - OVERALL GRADE (FROM PUNEET TOTAL SCORE): Grade 2 (of 3). Note: Part 3 shows pleomorphic lobular carcinoma in situ associated with the invasive lobular carcinoma. Part 2 shows a focus of atypical lobular hyperplasia. The case is discussed with Dr. Marisol Pearson. DUCTAL CARCINOMA IN SITU: Absent       TUMOR EXTENT       - SKIN (INVASION, SATELLITE FOCI): Not sampled       - NIPPLE: Not sampled       - SKELETAL MUSCLE: Not sampled         MARGINS - INVASIVE CARCINOMA -   - SITE(S) OF ALL POSITIVE MARGINS (SPECIFY EXTENT): Margins are free of involvement by invasive carcinoma   - IF ALL NEGATIVE--   --SITE(S) AND DISTANCE TO CLOSEST:Invasive carcinoma: Closest margin is 5 mm, distance to the deep margin. Next closest margin is 7 mm, distance to the inferior margin. Anterior aspect in part 3 is free but narrow at <1 mm, but >10 mm distant considering part 2 additional anterior margin tissue. Pleomorphic lobular carcinoma in situ: All margins are free of pleomorphic lobular carcinoma in situ, all >10 mm distant. LYMPHOVASCULAR INVASION: Absent         REGIONAL LYMPH NODES -       - NUMBER EXAMINED (SENTINEL AND NON-SENTINEL): 3       - NUMBER OF SENTINEL: 3 (a third node is found in the adipose tissue not sampled by frozen section, cassette C, on permanent sections. Focus of metastatic carcinoma is present in this third node).        - NUMBER WITH MACROMETASTASES: 1       - NUMBER WITH MICROMETASTASES:      0   - NUMBER WITH ISOLATED TUMOR CELLS: 0       - SIZE OF LARGEST METASTATIC DEPOSIT: Subcapsular focus with 4 mm largest linear dimension - EXTRANODAL EXTENSION: AbsentNote: Lymph nodes are evaluated through levels and by control appropriate immunostaining with pancytokeratin. TREATMENT EFFECT: RESPONSE TO PRE-SURGICAL   (NEOADJUVANT) THERAPY (IF APPLICABLE)--       - IN BREAST TISSUES: N/A       - IN LYMPH NODES:.  N/A         DISTANT METASTASIS pM   (only if confirmed pathologically in this case): N/A     PATHOLOGIC STAGE CLASSIFICATION (pTNM):     pT1c  pN1a   (T and /or N descriptors used only if applicable)     CAP InvasiveBreast 4500 in conjunction with AJCC 8 ed. Assessment/Plan:  1. Malignant neoplasm of upper-outer quadrant of left breast in female, estrogen receptor positive ([facility] [address]  · Continue scheduled follow-up for breast cancer surveillance as discussed. · Dr. Tarah Ellington (medical oncologist): 5/11/2022  · Dr. Rodriguez Sunshine (radiation oncologist): 4/28/2022  Mammogram: Every 12 months (first mammogram 6-12 months after radiation) per NCCN Guidelines  Next mammogram due prior to next follow up with Dr. Tarah Ellington in May 2022  · Survivorship Care Plan/Treatment summary extensively reviewed. Refer to Survivorship Care Plan/Treatment summary for details. · Extensive education provided on potential long/late term effects of treatment, signs/symptoms of recurrence to report, follow-up care plan, healthy lifestyle promotion, and available local and national resources. · Continue to follow with PCP for screening of new primary cancers and management of non-cancer related problems/diagnoses. · Next appt: As scheduled/needed  · Colorectal cancer screening: FIT negative 11/2021. Repeat 11/2022  · Breast cancer screening: Continue yearly mammograms  · Cervical cancer screening: Last PAP with high risk HPV testing negative 4/2019. Due for repeat 4/2024    2.  Long-term current use of tamoxifen  · Reviewed all side effects and indication for use  ·  Women on tamoxifen should receive an annual gynecologic assessment every 12 months if uterus present per NCCN Guidelines  · Last gynecologic exam done 4/2021  · Due for next gynecologic exam 4/2022              Electronically signed by RIKKI Rivero CNP          I spent 60 minutes face to face with patient and greater than 50% of time was spent on survivorship education .

## 2022-02-14 ENCOUNTER — OFFICE VISIT (OUTPATIENT)
Dept: PODIATRY | Age: 52
End: 2022-02-14
Payer: COMMERCIAL

## 2022-02-14 VITALS — WEIGHT: 143 LBS | HEIGHT: 69 IN | BODY MASS INDEX: 21.18 KG/M2

## 2022-02-14 DIAGNOSIS — M79.671 PAIN IN RIGHT FOOT: ICD-10-CM

## 2022-02-14 DIAGNOSIS — M21.861 GASTROCNEMIUS EQUINUS OF RIGHT LOWER EXTREMITY: ICD-10-CM

## 2022-02-14 DIAGNOSIS — M76.61 TENDONITIS, ACHILLES, RIGHT: Primary | ICD-10-CM

## 2022-02-14 DIAGNOSIS — M79.674 PAIN IN TOE OF RIGHT FOOT: ICD-10-CM

## 2022-02-14 DIAGNOSIS — B35.1 ONYCHOMYCOSIS OF TOENAIL: ICD-10-CM

## 2022-02-14 DIAGNOSIS — M72.2 PLANTAR FASCIAL FIBROMATOSIS OF LEFT FOOT: ICD-10-CM

## 2022-02-14 DIAGNOSIS — M79.672 PAIN IN LEFT FOOT: ICD-10-CM

## 2022-02-14 PROCEDURE — 99203 OFFICE O/P NEW LOW 30 MIN: CPT | Performed by: PODIATRIST

## 2022-02-14 PROCEDURE — 11720 DEBRIDE NAIL 1-5: CPT | Performed by: PODIATRIST

## 2022-02-14 PROCEDURE — 20550 NJX 1 TENDON SHEATH/LIGAMENT: CPT | Performed by: PODIATRIST

## 2022-02-14 RX ORDER — EFINACONAZOLE 100 MG/ML
1 SOLUTION TOPICAL DAILY
Qty: 8 ML | Refills: 3 | Status: SHIPPED | OUTPATIENT
Start: 2022-02-14

## 2022-02-14 RX ORDER — BUPIVACAINE HYDROCHLORIDE 5 MG/ML
1 INJECTION, SOLUTION PERINEURAL ONCE
Status: COMPLETED | OUTPATIENT
Start: 2022-02-14 | End: 2022-02-14

## 2022-02-14 RX ADMIN — BUPIVACAINE HYDROCHLORIDE 5 MG: 5 INJECTION, SOLUTION PERINEURAL at 09:21

## 2022-02-14 ASSESSMENT — ENCOUNTER SYMPTOMS
COLOR CHANGE: 0
NAUSEA: 0
DIARRHEA: 0
BACK PAIN: 0
SHORTNESS OF BREATH: 0

## 2022-02-14 NOTE — PROGRESS NOTES
Aníbal Pérez is a 45 y.o. female who presents to the office today with chief complaint of pain to the right Achilles tendon. Chief Complaint   Patient presents with    Ankle Pain     right achillies sore    Ingrown Toenail     right 2nd toe fungus     Foot Pain     lump in the arch of left foot    Symptoms began about 3 month(s) ago. Patient denies injury to the right foot. However, patient states that she did have an incident 6 months ago where she landed hard on her heels when jumping into a swimming pool. Patient states that the pain to the Achilles tendon is greatest with walking. Pain is rated 5 out of 10 at it's worst and is described as intermittent. Treatments prior to today's visit include: None. Patient also complains of the right second toenail being thickened and discolored. Patient also complains of a lump to the arch of the left foot. Allergies   Allergen Reactions    Wound Dressing Adhesive Rash     Steri strips        Past Medical History:   Diagnosis Date    Breast cancer ([facility] [address]        Prior to Admission medications    Medication Sig Start Date End Date Taking?  Authorizing Provider   Efinaconazole (JUBLIA) 10 % SOLN Apply 1 Applicatorful topically daily 2/14/22  Yes Kinjal Tee DPM   tamoxifen (NOLVADEX) 20 MG tablet Take 1 tablet by mouth daily 1/7/22 4/7/22 Yes Grant Brenner MD   vitamin E 400 UNIT capsule Take 1 capsule by mouth daily 1/7/22  Yes Grant Brenner MD   Magnesium 200 MG CHEW Take 200 mg by mouth daily 1/7/22  Yes Grant Brenner MD   Multiple Vitamins-Minerals (THERAPEUTIC MULTIVITAMIN-MINERALS) tablet Take 1 tablet by mouth daily   Yes Historical Provider, MD       Past Surgical History:   Procedure Laterality Date    BREAST BIOPSY Left 7/20/2021    LEFT BREAST LUMPECTOMY    SENTINEL LYMPH NODE BIOPSY  (@ 07:30 )  WITH FROZEN SECTION performed by Bandar Elaine DO at [facility] Left 7/20/2021    LEFT BREAST ONCOPLASTIC RECONSTRUCTION   PECT BLOCK performed by Jazmyn Rivera MD at [facility] MRI BREAST BX USING DEVICE LEFT Left 7/1/2021    MRI BREAST BX USING DEVICE LEFT 7/1/2021 [facility]    US BREAST NEEDLE BIOPSY LEFT Left 6/3/2021    US BREAST NEEDLE BIOPSY LEFT 6/3/2021 [address] [place] [address]    US LYMPH NODE BIOPSY  6/3/2021    US LYMPH NODE BIOPSY 6/3/2021 [facility] [address]    WISDOM TOOTH EXTRACTION         Family History   Problem Relation Age of Onset    Other Father         Gout    Breast Cancer Mother 59        ductal carcinoma lumpectomy w/ radiation   [place] Migraines Mother     No Known Problems Brother     Breast Cancer Paternal Grandmother [de-identified]    Heart Attack Maternal Grandfather 76       Social History     Tobacco Use    Smoking status: Never Smoker    Smokeless tobacco: Never Used   Substance Use Topics    Alcohol use: No       Review of Systems   Constitutional: Negative for activity change, appetite change, chills, diaphoresis, fatigue and fever. Respiratory: Negative for shortness of breath. Cardiovascular: Negative for leg swelling. Gastrointestinal: Negative for diarrhea and nausea. Endocrine: Negative for cold intolerance, heat intolerance and polyuria. Musculoskeletal: Positive for arthralgias. Negative for back pain, gait problem, joint swelling and myalgias. Skin: Negative for color change, pallor, rash and wound. Allergic/Immunologic: Negative for environmental allergies and food allergies. Neurological: Negative for dizziness, weakness, light-headedness and numbness. Hematological: Does not bruise/bleed easily. Psychiatric/Behavioral: Negative for behavioral problems, confusion and self-injury. The patient is not nervous/anxious. Vitals: There were no vitals filed for this visit. General: AAO x 3 in NAD. Integument: There are no rashes, ulcers, or breaks in the skin noted to the bilateral lower extremities.      There is a subcutaneous nodule noted to the plantar aspect of the left foot within the central band of the plantar fascia. This nodule is not freely moveable, but is embedded within the fascia. There is pain with palpation to this ligament. There is no fluctuance noted to this area. There is no erythema, calor, or open wound noted to this area. Toenails 2&4 of the right foot do present with thickness, elongation, discoloration, brittleness, subungual debris. There is pain with palpation and debridement of these toenails. Toenails 1-5 of the left foot do not present with thickness, elongation, discoloration, brittleness, subungual debris. Interdigital maceration absent to web spaces 1-4, Bilateral.     There are no preulcerative lesions noted to the right foot. There are no preulcerative lesions noted to the left foot. The skin to the bilateral feet is not thin and shiny. The skin to the bilateral feet is  warm, supple, and dry. Vascular: DP pulse of the right foot is  palpable. DP pulse of the left foot is  palpable. PT pulse of the right foot is  palpable. PT pulse of the left foot is  palpable. CFT is less than 3 secs to the digits of the right foot. CFT is less than 3 secs to the digits of the left foot. There is no edema noted to the bilateral foot or ankle. There is hair growth noted to the digits of the bilateral feet. There are no varicosities noted to the right foot/ankle. There are no varicosities noted to the left foot/ankle. Erythema is absent to the bilateral feet. Neurological: Reflexes are present to the right plantar foot and to the Achilles tendon. Reflexes are present to the left plantar foot and to the Achilles tendon. Epicritic sensation is  intact to the right foot. Epicritic sensation is  intact to the left foot.     Musculoskeletal:  Muscle strength is +5/5 to all four muscle groups of the right lower extremity and +5/5 to all four muscle groups of the left lower extremity. There are no areas of subluxation, dislocation, or laxity noted to either lower extremity. Range of motion to the right ankle is  free of pain or grinding. Range of motion to the left ankle is  free of pain or grinding. Range of motion to the right subtalar joint is  free of pain or grinding. Range of motion to the left subtalar joint is  free of pain or grinding. There is soft tissue resistance upon passive dorsiflexion of the right ankle with the knee(s) extended. However, this soft tissue resistance is not present with the knee(s) flexed. No abnormalities, asymmetries, or misalignments are seen between the extremities. Weightbearing evaluation does reveal rearfoot eversion, medial prominence of the talar head, loss of the medial longitudinal arch height, and too many toes sign bilaterally. The lesser digits of the right foot are not contracted. The lesser digits of the left foot are not contracted. There is no prominence noted to the first metatarsal head without abduction of the hallux of the right foot. There is no prominence noted to the first metatarsal head without abduction of the hallux of the left foot. There is pain with palpation to the posterior aspect of the right calcaneus at the insertion of the Achilles tendon. There is no pain with palpation to the watershed region of the Achilles tendon. There is no crepitus noted with this range of motion. Shoe examination was performed. Biomechanical Exam: normal bilaterally. Asessment: Patient is a 45 y.o. female with:    Diagnosis Orders   1. Tendonitis, Achilles, right  Ambulatory referral to Physical Therapy   2. Gastrocnemius equinus of right lower extremity  Ambulatory referral to Physical Therapy   3. Plantar fascial fibromatosis of left foot  bupivacaine (MARCAINE) 0.5 % injection 5 mg    methylPREDNISolone acetate (DEPO-MEDROL) injection 10 mg    VA INJECT TENDON SHEATH/LIGAMENT   4. Onychomycosis of toenail  Efinaconazole (JUBLIA) 10 % SOLN    NM DEBRIDEMENT OF NAIL(S), 1-5   5. Pain in right foot  Ambulatory referral to Physical Therapy   6. Pain in left foot  bupivacaine (MARCAINE) 0.5 % injection 5 mg    methylPREDNISolone acetate (DEPO-MEDROL) injection 10 mg    NM INJECT TENDON SHEATH/LIGAMENT   7. Pain in toe of right foot  Efinaconazole (JUBLIA) 10 % SOLN    NM DEBRIDEMENT OF NAIL(S), 1-5       Plan:  1. Clinical evaluation of the patient. 2. Patient diagnosed with Achilles tendonitis and equinus right and given an order for physical therapy for evaluation and treatment of this. The right second and fourth toenails were debrided in both thickness and length with a nail nipper and . I recommended oral Lamisil, but the patient declines this. I gave the patient a prescription for topical Jublia with instructions on application. Patient diagnosed with a plantar fibroma and she was informed that this is a benign growth within the plantar fascia. I recommended a steroid injection to the Left plantar fibroma. Verbal consent was obtained from the patient after all questions answered. The Left foot was prepped with an alcohol swab. The injection was placed at the most tender area using a  1.0 cc total of a combination of 0.5 cc of 0.5% Marcaine plain and 0.5 cc of Depomedrol 20 mg/ml. A band-aid was applied, patient advised of possible local steroid reaction symptoms, and patient instructed to limit activities to this area for 24 hours. Patient informed that if injection therapy does not adequately relieve her pain, then I would recommend surgery to remove this growth. Patient informed that if surgery is required, then she would need a minimum of 4 weeks NWB to the left foot. Patient states that she understands this. 3. Contact office with any questions/problems/concerns. Return in about 6 weeks (around 3/28/2022) for Achilles tendonitis right, onychomycosis right, plantar fibroma left. 2/14/2022      Nithya Godfrey DPM

## 2022-02-16 ENCOUNTER — OFFICE VISIT (OUTPATIENT)
Dept: ONCOLOGY | Age: 52
End: 2022-02-16
Payer: COMMERCIAL

## 2022-02-16 VITALS
BODY MASS INDEX: 21.63 KG/M2 | SYSTOLIC BLOOD PRESSURE: 112 MMHG | HEART RATE: 64 BPM | RESPIRATION RATE: 16 BRPM | DIASTOLIC BLOOD PRESSURE: 65 MMHG | OXYGEN SATURATION: 98 % | WEIGHT: 146.5 LBS

## 2022-02-16 DIAGNOSIS — Z17.0 MALIGNANT NEOPLASM OF UPPER-OUTER QUADRANT OF LEFT BREAST IN FEMALE, ESTROGEN RECEPTOR POSITIVE (HCC): Primary | ICD-10-CM

## 2022-02-16 DIAGNOSIS — C50.412 MALIGNANT NEOPLASM OF UPPER-OUTER QUADRANT OF LEFT BREAST IN FEMALE, ESTROGEN RECEPTOR POSITIVE (HCC): Primary | ICD-10-CM

## 2022-02-16 DIAGNOSIS — Z79.810 LONG-TERM CURRENT USE OF TAMOXIFEN: ICD-10-CM

## 2022-02-16 PROCEDURE — 99417 PROLNG OP E/M EACH 15 MIN: CPT | Performed by: NURSE PRACTITIONER

## 2022-02-16 PROCEDURE — 99215 OFFICE O/P EST HI 40 MIN: CPT | Performed by: NURSE PRACTITIONER

## 2022-02-16 ASSESSMENT — ENCOUNTER SYMPTOMS
APNEA: 0
COLOR CHANGE: 0
COUGH: 0
SHORTNESS OF BREATH: 0
DIARRHEA: 0
ABDOMINAL PAIN: 0
VOMITING: 0
BACK PAIN: 0
CONSTIPATION: 0
NAUSEA: 0
TROUBLE SWALLOWING: 0

## 2022-02-28 ENCOUNTER — HOSPITAL ENCOUNTER (OUTPATIENT)
Dept: PHYSICAL THERAPY | Facility: CLINIC | Age: 52
Setting detail: THERAPIES SERIES
Discharge: HOME | End: 2022-02-28
Payer: COMMERCIAL

## 2022-02-28 PROCEDURE — 97140 MANUAL THERAPY 1/> REGIONS: CPT

## 2022-02-28 PROCEDURE — 97110 THERAPEUTIC EXERCISES: CPT

## 2022-02-28 NOTE — FLOWSHEET NOTE
[] [facility]  [address]  [phone]  F: [phone] [] [address]  [facility]   [address]  P: [phone]  F: [phone] [x] [address]  [facility]  P: [phone]  F: [phone] [] [address]  P: [phone]  F: [phone] [] [address]  [facility]   [address]   [place]: [phone]  F: [phone]      Physical Therapy Daily Treatment Note    Date:  2022  Patient Name:  [place] Kate    :  1970  MRN: [id number]  Physician: [facility], MD                               Insurance: [facility]  hard max  Medical Diagnosis: Bryan Grad. eoplasm of left breast, ER+, unspecified site of breast  Rehab Codes: C50.912, Z17.0  Onset date: 21 surg      Next Dr's appt.: ongoing   Visit# / total visits:      Cancels/No Shows: 1/0    Subjective:  Pt reports she has been doing well, did notice some irritation under arm/axilla and slight fullness -wore her sleeve and has improved. Found some good sports bras that are supportive but not uncomfortable. Notes everything is getting easier and compliant w/ HEP and ex/swimming at [facility] regularly. No fullness, tightness or N&T into arm, wearing sleeve prn. Cont's to narda  tamoxifen well. Pain:  [] Yes  [x] No Location: L breast, axilla Pain Rating: (0-10 scale) 0/10   Pain altered Tx:  [x] No  [] Yes  Action:  Comments:    Objective:  Modalities:   Precautions: L BrCa, 1+/3 LN. Lumpectomy 21. No chemo, radiation 16 treatments -.  Tamoxifen started 10/10/21  Manual: Utilized PORi breast protocol to LEFT upper quarter for gentle manual lymphatic drainage, myofascial release and joint mobility to facilitate better function ROM and dynamics of lymph system. Exercises: Phase 2: 4-6 RPE (moderate to hard) until 6 mo post chemo/rad/immuno ( 3/30/22 progress to phase 3)  Exercise Reps/ Time Weight/ Level Comments             Diaphragmatic breathing 5 cycles       Bye bye  3x   Fwd, side   Wand flexion  10x  HEP     Elbow winging supine 1m  HEP               Post shld rolls 10x     Scapular retraction  10x  HEP     wall slides  10x  HEP  flex             pec stretch 3x30     door, varied arm position         SL ABD, ER, HAB 10x HEP    OH flex 10x     Wall angels 10x           UE tband: ext, row, ER, tricep, bicep 15-20x ea lime Issued blue for HEP   Band on wall 10x ea lime Lateral, diagonal         Core Stab: TrA contr, marches 10x ea     bridges 10x     Clam shells      Piriformis stretch 3x30       Other: numb axilla and post arm. Walking regularly, is [employer] member and using gym occ/weights and swimming 3-4 days a week. L shoulder AROM supine: flex 152° \"pulling in axilla\" before RX, 155° after RX      Treatment Charges: Mins Units   []  Modalities     [x]  Ther Exercise 30 2   [x]  Manual Therapy 20 1   []  Ther Activities     []  Aquatics     []  Vasocompression     []  Other     Total Treatment time 50        Assessment: [x] Progressing toward goals. Partial PORI breast protocol to L upper quarter. Min muscular tightness pec and subscap M's. Maintaining ROM well w/ improvement noted. Rev HEP and completed ex per log w/ added core stab ex and hip stretches. Cont'd emphasis on pushing into end ROM w/ stretches. Instructed in self lymphatic massage and issued HO for HEP of new ex and self lymphatic massage for home. Emphasized pertinent ex to complete daily and to cont to monitor S&S of lymphedema. Will cont monthly for manual prn, ROM and HEP/ex progression to phase 3. [] No change.      [x] Other: Educated pt on benefits of working in suggested RPE to ensure safe range for exercise during/post chemotherapy/radiation to as narda      Time In: 10:00 am           Time Out: 10:55 am    Electronically signed by:  Aleena Frankel PT

## 2022-03-28 ENCOUNTER — HOSPITAL ENCOUNTER (OUTPATIENT)
Dept: WOMENS IMAGING | Age: 52
Discharge: HOME | End: 2022-03-30
Payer: COMMERCIAL

## 2022-03-28 DIAGNOSIS — Z17.0 MALIGNANT NEOPLASM OF UPPER-OUTER QUADRANT OF LEFT BREAST IN FEMALE, ESTROGEN RECEPTOR POSITIVE (HCC): ICD-10-CM

## 2022-03-28 DIAGNOSIS — C50.412 MALIGNANT NEOPLASM OF UPPER-OUTER QUADRANT OF LEFT BREAST IN FEMALE, ESTROGEN RECEPTOR POSITIVE (HCC): ICD-10-CM

## 2022-03-28 PROCEDURE — G0279 TOMOSYNTHESIS, MAMMO: HCPCS

## 2022-04-21 ENCOUNTER — OFFICE VISIT (OUTPATIENT)
Dept: OBGYN CLINIC | Age: 52
End: 2022-04-21
Payer: COMMERCIAL

## 2022-04-21 VITALS
DIASTOLIC BLOOD PRESSURE: 72 MMHG | WEIGHT: 145 LBS | SYSTOLIC BLOOD PRESSURE: 114 MMHG | BODY MASS INDEX: 21.48 KG/M2 | HEIGHT: 69 IN

## 2022-04-21 DIAGNOSIS — N89.8 VAGINAL DRYNESS: ICD-10-CM

## 2022-04-21 DIAGNOSIS — Z01.419 WELL FEMALE EXAM WITH ROUTINE GYNECOLOGICAL EXAM: Primary | ICD-10-CM

## 2022-04-21 PROCEDURE — 99396 PREV VISIT EST AGE 40-64: CPT | Performed by: NURSE PRACTITIONER

## 2022-04-21 NOTE — PROGRESS NOTES
History and Physical  [address], [address] [address]. [phone]   Fax [phone]  Yarelis Ortega  2022              45 y.o. Chief Complaint   Patient presents with    Annual Exam       Patient's last menstrual period was 2021 (within weeks). Primary Care Physician: Binu Rivera MD    The patient was seen and examined. She is here for her annual exam. C/o vaginal dryness since starting new medication for breast cancer. Her bowels are regular. There are no voiding complaints. She denies any bloating. She denies vaginal discharge and was counseled on STD's and the need for barrier contraception.      HPI : Yarelis Ortega is a 45 y.o. female     Annual exam  Vaginal dryness  Hx breast cancer    no Bloating  no Early Satiety  no Unexplained weight change of more than 15 lbs  no  PMB  no  PCB  ________________________________________________________________________  OB History    Para Term  AB Living   2 2 2 0 0 2   SAB IAB Ectopic Molar Multiple Live Births   0 0 0 0 0 1      # Outcome Date GA Lbr Rajendra/2nd Weight Sex Delivery Anes PTL Lv   2 Term    7 lb 13 oz (3.544 kg) F Vag-Spont      1 Term    7 lb 3 oz (3.26 kg) F Vag-Spont   KEYANA     Past Medical History:   Diagnosis Date    Breast cancer [facility])                                                                    Past Surgical History:   Procedure Laterality Date    BREAST BIOPSY Left 2021    LEFT BREAST LUMPECTOMY    SENTINEL LYMPH NODE BIOPSY  (@ 07:30 )  WITH FROZEN SECTION performed by Stone Ryan DO at [address] [place] [address] Left 2021    LEFT BREAST ONCOPLASTIC RECONSTRUCTION   PECT BLOCK performed by Iraida Rankin MD at [address] [place] [address] MRI BREAST BX USING DEVICE LEFT Left 2021    MRI BREAST BX USING DEVICE LEFT 2021 [facility]    US BREAST NEEDLE BIOPSY LEFT Left 6/3/2021    US BREAST NEEDLE BIOPSY LEFT 6/3/2021 [facility] LYMPH NODE BIOPSY  6/3/2021     LYMPH NODE BIOPSY 6/3/2021 [facility] [address]    WISDOM TOOTH EXTRACTION       Family History   Problem Relation Age of Onset    Other Father         Gout    Breast Cancer Mother 59        ductal carcinoma lumpectomy w/ radiation   Lilyan Montane Migraines Mother     No Known Problems Brother     Breast Cancer Paternal Grandmother [de-identified]    Heart Attack Maternal Grandfather 76     Social History     Socioeconomic History    Marital status:      Spouse name: Not on file    Number of children: Not on file    Years of education: Not on file    Highest education level: Not on file   Occupational History    Not on file   Tobacco Use    Smoking status: Never Smoker    Smokeless tobacco: Never Used   Substance and Sexual Activity    Alcohol use: No    Drug use: No    Sexual activity: Yes     Partners: Male   Other Topics Concern    Not on file   Social History Narrative    Not on file     Social Determinants of Health     Financial Resource Strain:     Difficulty of Paying Living Expenses: Not on file   Food Insecurity:     Worried About [address] in the Last Year: Not on file    Whitney of Food in the Last Year: Not on file   Transportation Needs:     Lack of Transportation (Medical): Not on file    Lack of Transportation (Non-Medical):  Not on file   Physical Activity:     Days of Exercise per Week: Not on file    Minutes of Exercise per Session: Not on file   Stress:     Feeling of Stress : Not on file   Social Connections:     Frequency of Communication with Friends and Family: Not on file    Frequency of Social Gatherings with Friends and Family: Not on file    Attends Oriental orthodox Services: Not on file    Active Member of Clubs or Organizations: Not on file    Attends Club or Organization Meetings: Not on file    Marital Status: Not on file   Intimate Partner Violence:     Fear of Current or Ex-Partner: Not on file    Emotionally Abused: Not on file    Physically Abused: Not on file    Sexually Abused: Not on file   Housing Stability:     Unable to Pay for Housing in the Last Year: Not on file    Number of [place] in the Last Year: Not on file    Unstable Housing in the Last Year: Not on file       MEDICATIONS:  Current Outpatient Medications   Medication Sig Dispense Refill    Efinaconazole (JUBLIA) 10 % SOLN Apply 1 Applicatorful topically daily 8 mL 3    tamoxifen (NOLVADEX) 20 MG tablet Take 1 tablet by mouth daily 90 tablet 1    Multiple Vitamins-Minerals (THERAPEUTIC MULTIVITAMIN-MINERALS) tablet Take 1 tablet by mouth daily       No current facility-administered medications for this visit. ALLERGIES:  Allergies as of 04/21/2022 - Fully Reviewed 04/21/2022   Allergen Reaction Noted    Wound dressing adhesive Rash 08/18/2021       Symptoms of decreased mood absent  Symptoms of anhedonia absent    **If either question is answered in a  positive fashion then complete the PHQ9 Scoring Evaluation and make the appropriate referral**      Immunization status: stated as current, but no records available. Gynecologic History:  Menarche: 14 yo  Menopause at [address] [facility] yo     Patient's last menstrual period was 08/16/2021 (within weeks). Sexually Active: Yes    STD History: No     Permanent Sterilization: No   Reversible Birth Control: No        Hormone Replacement Exposure: No      Genetic Qualified Family History of Breast, Ovarian , Colon or Uterine Cancer: Yes Mom breast cancer age 58     If YES see scanned worksheet.     Preventative Health Testing:    Health Maintenance:  Health Maintenance Due   Topic Date Due    Shingles Vaccine (1 of 2) Never done    COVID-19 Vaccine (3 - Pfizer risk 4-dose series) 03/16/2021       Date of Last Pap Smear: 4/10/2019 neg  Abnormal Pap Smear History: denies  Colposcopy History:   Date of Last Mammogram: 3/28/2022 6 month follow up   Date of Last Colonoscopy: Date of Last Bone Density:      ________________________________________________________________________        REVIEW OF SYSTEMS:    yes   A minimum of an eleven point review of systems was completed. Review Of Systems (11 point):  Constitutional: No fever, chills or malaise; No weight change or fatigue  Head and Eyes: No vision changes, Headache, Dizziness or trauma in last 12 months  ENT ROS: No hearing, Tinnitis, sinus or taste problems  Hematological and Lymphatic ROS:No Lymphoma, Von Willebrand's, Hemophillia or Bleeding History  Psych ROS: No Depression, Homicidal thoughts,suicidal thoughts, or anxiety  Breast ROS: No breast abnormalities or lumps  Respiratory ROS: No SOB, Pneumoniae,Cough, or Pulmonary Embolism   Cardiovascular ROS: No Chest Pain with Exertion, Palpitations, Syncope, Edema, Arrhythmia  Gastrointestinal ROS: No Indigestion, Heartburn, Nausea, vomiting, Diarrhea, Constipation,or Bowel Changes; No Bloody Stools or melena  Genito-Urinary ROS: No Dysuria, Hematuria or Nocturia. No Urinary Incontinence or Vaginal Discharge  Musculoskeletal ROS: No Arthralgia, Arthritis,Gout,Osteoporosis or Rheumatism  Neurological ROS: No CVA, Migraines, Epilepsy, Seizure Hx, or Limb Weakness  Dermatological ROS: No Rash, Itching, Hives, Mole Changes or Cancer                                                                                                                                                                                                                                  PHYSICAL Exam:     Constitutional:  Vitals:    04/21/22 1025   BP: 114/72   Site: Right Upper Arm   Position: Sitting   Cuff Size: Medium Adult   Weight: 145 lb (65.8 kg)   Height: 5' 9\" (1.753 m)       Chaperone for Intimate Exam   Chaperone was offered and accepted as part of the rooming process.  Chaperone: Mary OLIVARES          General Appearance: This  is a well Developed, well Nourished, well groomed female.       Her BMI was reviewed. Nutritional decision making was discussed. Skin:  There was a Normal Inspection of the skin without rashes or lesions. There were no rashes. (Papular, Maculopapular, Hives, Pustular, Macular)     There were no lesions (Ulcers, Erythema, Abn. Appearing Nevi)            Lymphatic:  No Lymph Nodes were Palpable in the neck , axilla or groin.  0 # Of Lymph Nodes; Location ; Character [Normal]  [Shotty] [Tender] [Enlarged]     Neck and EENT:  The neck was supple. There were no masses   The thyroid was not enlarged and had no masses. Perrla, EOMI B/L, TMI B/L No Abnormalities. Throat inspected-No exudates or Masses, Nares Patent No Masses        Respiratory: The lungs were auscultated and found to be clear. There were no rales, rhonchi or wheezes. There was a good respiratory effort. Cardiovascular: The heart was in a regular rate and rhythm. . No S3 or S4. There was no murmur appreciated. Location, grade, and radiation are not applicable. Extremities: The patients extremities were without calf tenderness, edema, or varicosities. There was full range of motion in all four extremities. Pulses in all four extremities were appreciated and are 2/4. Abdomen: The abdomen was soft and non-tender. There were good bowel sounds in all quadrants and there was no guarding, rebound or rigidity. On evaluation there was no evidence of hepatosplenomegaly and there was no costal vertebral johanna tenderness bilaterally. No hernias were appreciated. Abdominal Scars: none    Psych: The patient had a normal Orientation to: Time, Place, Person, and Situation  There is no Mood / Affect changes    Breast:  (Chest)  Declined d/t following oncology and frequent mammograms  Self breast exams were reviewed in detail. Literature was given.     Pelvic Exam:  External genitalia: normal general appearance  Urinary system: urethral meatus normal  Vaginal: normal mucosa without prolapse or lesions  Cervix: normal appearance  Adnexa: normal bimanual exam  Uterus: normal single, nontender    Rectal Exam:  exam declined by patient          Musculosk:  Normal Gait and station was noted. Digits were evaluated without abnormal findings. Range of motion, stability and strength were evaluated and found to be appropriate for the patients age. ASSESSMENT:      45 y.o. Annual   Diagnosis Orders   1. Well female exam with routine gynecological exam            Chief Complaint   Patient presents with    Annual Exam          Past Medical History:   Diagnosis Date    Breast cancer [facility])          Patient Active Problem List    Diagnosis Date Noted    Malignant neoplasm of upper-outer quadrant of left breast in female, estrogen receptor positive ([facility] [address] 06/16/2021    Body mass index (BMI) 20.0-20.9, adult 08/28/2017          Hereditary Breast, Ovarian, Colon and Uterine Cancer screening Done. Tobacco & Secondary smoke risks reviewed; instructed on cessation and avoidance      Counseling Completed:  Preventative Health Recommendations and Follow up. The patient was informed of the recommended preventative health recommendations. 1. Annuals every year; Cytology collections per prevailing guidelines. 2. Mammograms begin every year at 37 yo if no abnormalities are found and no family history. 3. Bone density studies every 2-3 years. Begin at 73 yo. If no fracture history or osteoporosis family history. (significant). 4. Colonoscopy begin at 38 yo. Repeat every ten years if negative and no family history. 5. Calcium of 3919-0319 mg/day in split dosing  6. Vitamin D 400-800 IU/day  7. All other preventative health recommendations will be managed by the patients Primary care physician. PLAN:  No follow-ups on file. Pap smear collected  Reviewed use of coconut oil and vaginal moisturizers   Repeat Annual every 1 year  Cervical Cytology Evaluation begins at 24years old.   If Negative Cytology, Follow-up screening per current guidelines. Mammograms every 1 year. If 35 yo and last mammogram was negative. Calcium and Vitamin D dosing reviewed. Colonoscopy screening reviewed as well as onset for bone density testing. Birth control and barrier recommendations discussed. STD counseling and prevention reviewed. Gardisil counseling completed for all patients 10-35 yo. Routine health maintenance per patients PCP. No orders of the defined types were placed in this encounter. The patient, Ross Smith is a 45 y.o. female, was seen with a total time spent of 30 minutes for the visit on this date of service by the E/M provider. The time component had both face to face and non face to face time spent in determining the total time component. Counseling and education regarding her diagnosis listed below and her options regarding those diagnoses were also included in determining her time component. Diagnosis Orders   1. Well female exam with routine gynecological exam          The patient had her preventative health maintenance recommendations and follow-up reviewed with her at the completion of her visit.

## 2022-04-25 DIAGNOSIS — Z01.419 WELL FEMALE EXAM WITH ROUTINE GYNECOLOGICAL EXAM: ICD-10-CM

## 2022-05-11 ENCOUNTER — OFFICE VISIT (OUTPATIENT)
Dept: ONCOLOGY | Age: 52
End: 2022-05-11
Payer: COMMERCIAL

## 2022-05-11 ENCOUNTER — HOSPITAL ENCOUNTER (OUTPATIENT)
Age: 52
Discharge: HOME | End: 2022-05-11
Payer: COMMERCIAL

## 2022-05-11 ENCOUNTER — HOSPITAL ENCOUNTER (OUTPATIENT)
Dept: RADIATION ONCOLOGY | Age: 52
Discharge: HOME | End: 2022-05-11
Attending: RADIOLOGY
Payer: COMMERCIAL

## 2022-05-11 ENCOUNTER — TELEPHONE (OUTPATIENT)
Dept: ONCOLOGY | Age: 52
End: 2022-05-11

## 2022-05-11 VITALS
BODY MASS INDEX: 21.56 KG/M2 | DIASTOLIC BLOOD PRESSURE: 78 MMHG | HEART RATE: 66 BPM | WEIGHT: 146 LBS | TEMPERATURE: 98.6 F | SYSTOLIC BLOOD PRESSURE: 111 MMHG

## 2022-05-11 VITALS
DIASTOLIC BLOOD PRESSURE: 78 MMHG | OXYGEN SATURATION: 98 % | RESPIRATION RATE: 14 BRPM | WEIGHT: 146.4 LBS | HEART RATE: 66 BPM | BODY MASS INDEX: 21.62 KG/M2 | SYSTOLIC BLOOD PRESSURE: 111 MMHG | TEMPERATURE: 98.6 F

## 2022-05-11 DIAGNOSIS — Z17.0 MALIGNANT NEOPLASM OF UPPER-OUTER QUADRANT OF LEFT BREAST IN FEMALE, ESTROGEN RECEPTOR POSITIVE (HCC): ICD-10-CM

## 2022-05-11 DIAGNOSIS — D05.02 BREAST NEOPLASM, TIS (LCIS), LEFT: ICD-10-CM

## 2022-05-11 DIAGNOSIS — Z79.810 LONG-TERM CURRENT USE OF TAMOXIFEN: Primary | ICD-10-CM

## 2022-05-11 DIAGNOSIS — C50.412 MALIGNANT NEOPLASM OF UPPER-OUTER QUADRANT OF LEFT BREAST IN FEMALE, ESTROGEN RECEPTOR POSITIVE (HCC): ICD-10-CM

## 2022-05-11 DIAGNOSIS — R23.2 HOT FLASHES DUE TO TAMOXIFEN: ICD-10-CM

## 2022-05-11 DIAGNOSIS — C50.412 MALIGNANT NEOPLASM OF UPPER-OUTER QUADRANT OF LEFT BREAST IN FEMALE, ESTROGEN RECEPTOR POSITIVE (HCC): Primary | ICD-10-CM

## 2022-05-11 DIAGNOSIS — T45.1X5A HOT FLASHES DUE TO TAMOXIFEN: ICD-10-CM

## 2022-05-11 DIAGNOSIS — Z17.0 MALIGNANT NEOPLASM OF UPPER-OUTER QUADRANT OF LEFT BREAST IN FEMALE, ESTROGEN RECEPTOR POSITIVE (HCC): Primary | ICD-10-CM

## 2022-05-11 DIAGNOSIS — Z80.3 FAMILY HISTORY OF BREAST CANCER: ICD-10-CM

## 2022-05-11 LAB
ABSOLUTE EOS #: 0.2 K/UL (ref 0–0.4)
ABSOLUTE LYMPH #: 1.6 K/UL (ref 1–4.8)
ABSOLUTE MONO #: 0.5 K/UL (ref 0.1–1.2)
ALBUMIN SERPL-MCNC: 4.3 G/DL (ref 3.5–5.2)
ALBUMIN/GLOBULIN RATIO: 1.5 (ref 1–2.5)
ALP BLD-CCNC: 146 U/L (ref 35–104)
ALT SERPL-CCNC: 14 U/L (ref 5–33)
ANION GAP SERPL CALCULATED.3IONS-SCNC: 11 MMOL/L (ref 9–17)
AST SERPL-CCNC: 30 U/L
BASOPHILS # BLD: 1 % (ref 0–2)
BASOPHILS ABSOLUTE: 0 K/UL (ref 0–0.2)
BILIRUB SERPL-MCNC: 0.31 MG/DL (ref 0.3–1.2)
BUN BLDV-MCNC: 15 MG/DL (ref 6–20)
CALCIUM SERPL-MCNC: 9.7 MG/DL (ref 8.6–10.4)
CHLORIDE BLD-SCNC: 101 MMOL/L (ref 98–107)
CO2: 27 MMOL/L (ref 20–31)
CREAT SERPL-MCNC: 0.83 MG/DL (ref 0.5–0.9)
EOSINOPHILS RELATIVE PERCENT: 2 % (ref 1–4)
GFR AFRICAN AMERICAN: >60 ML/MIN
GFR NON-AFRICAN AMERICAN: >60 ML/MIN
GFR SERPL CREATININE-BSD FRML MDRD: ABNORMAL ML/MIN/{1.73_M2}
GLUCOSE BLD-MCNC: 98 MG/DL (ref 70–99)
HCT VFR BLD CALC: 39.6 % (ref 36–46)
HEMOGLOBIN: 13.8 G/DL (ref 12–16)
LYMPHOCYTES # BLD: 20 % (ref 24–44)
MCH RBC QN AUTO: 32.2 PG (ref 26–34)
MCHC RBC AUTO-ENTMCNC: 34.7 G/DL (ref 31–37)
MCV RBC AUTO: 92.8 FL (ref 80–100)
MONOCYTES # BLD: 6 % (ref 2–11)
PDW BLD-RTO: 12.7 % (ref 12.5–15.4)
PLATELET # BLD: 268 K/UL (ref 140–450)
PMV BLD AUTO: 8.3 FL (ref 6–12)
POTASSIUM SERPL-SCNC: 3.9 MMOL/L (ref 3.7–5.3)
RBC # BLD: 4.27 M/UL (ref 4–5.2)
SEG NEUTROPHILS: 71 % (ref 36–66)
SEGMENTED NEUTROPHILS ABSOLUTE COUNT: 5.4 K/UL (ref 1.8–7.7)
SODIUM BLD-SCNC: 139 MMOL/L (ref 135–144)
TOTAL PROTEIN: 7.2 G/DL (ref 6.4–8.3)
WBC # BLD: 7.7 K/UL (ref 3.5–11)

## 2022-05-11 PROCEDURE — 80053 COMPREHEN METABOLIC PANEL: CPT

## 2022-05-11 PROCEDURE — 36415 COLL VENOUS BLD VENIPUNCTURE: CPT

## 2022-05-11 PROCEDURE — 99212 OFFICE O/P EST SF 10 MIN: CPT | Performed by: RADIOLOGY

## 2022-05-11 PROCEDURE — 99211 OFF/OP EST MAY X REQ PHY/QHP: CPT | Performed by: INTERNAL MEDICINE

## 2022-05-11 PROCEDURE — 99214 OFFICE O/P EST MOD 30 MIN: CPT | Performed by: RADIOLOGY

## 2022-05-11 PROCEDURE — 85025 COMPLETE CBC W/AUTO DIFF WBC: CPT

## 2022-05-11 PROCEDURE — 99214 OFFICE O/P EST MOD 30 MIN: CPT | Performed by: INTERNAL MEDICINE

## 2022-05-11 NOTE — PROGRESS NOTES
Jesica Clark  5/11/2022  1:11 PM      Vitals:    05/11/22 1300   BP: 111/78   Pulse: 66   Resp: 14   Temp: 98.6 °F (37 °C)   SpO2: 98%    : Wt Readings from Last 1 Encounters:   05/11/22 146 lb 6.4 oz (66.4 kg)                Current Outpatient Medications:     Efinaconazole (JUBLIA) 10 % SOLN, Apply 1 Applicatorful topically daily, Disp: 8 mL, Rfl: 3    tamoxifen (NOLVADEX) 20 MG tablet, Take 1 tablet by mouth daily, Disp: 90 tablet, Rfl: 1    Multiple Vitamins-Minerals (THERAPEUTIC MULTIVITAMIN-MINERALS) tablet, Take 1 tablet by mouth daily, Disp: , Rfl:     Immunizations:    Influenza status:    [x]   Current   []   Patient declined    Pneumococcal status:  [x]   Current  []   Patient declined  Covid status:   [x]  Dose #1:                     [x]  Dose #2:               []   Patient declined    Smoking Status:    [] Smoker - PPD:   [] Nonsmoker - Quit Date:               [x] Never a smoker      Cancer Screening:  Colonoscopy   [x] Current       [] Not current   [] Not current, but scheduled   [] NA  Mammogram   [x] Current       [] Not current   [] Not current, but scheduled   [] NA  Prostate           [] Current       [] Not current   [] Not current, but scheduled   [x] NA  PAP/Pelvic      [x] Current       [] Not current   [] Not current, but scheduled   [] NA  Skin                 [x] Current       [] Not current   [] Not current, but scheduled   [] NA     Hormone:  Lupron []   Last dose given:           Next dose due:   Eligard []   Last dose given:           Next dose due:   Aromatase Inhibitors []   Medication name: Tamoxifen  N/A:  []           FALLS RISK SCREEN  Instructions:  Assess the patient and enter the appropriate indicators that are present for fall risk identification. Total the numbers entered and assign a fall risk score from Table 2.  Reassess patient at a minimum every 12 weeks or with status change. Assessment   Date  5/11/2022     1.   Mental Ability: confusion/cognitively impaired 0     2. Elimination Issues: incontinence, frequency 0       3. Ambulatory: use of assistive devices (walker, cane, off-loading devices),        attached to equipment (IV pole, oxygen) 0     4. Sensory Limitations: dizziness, vertigo, impaired vision 0     5. Age less than 65        0     6. Age 72 or greater 0     7. Medication: diuretics, strong analgesics, hypnotics, sedatives,        antihypertensive agents 0   8. Falls:  recent history of falls within the last 3 months (not to include slipping or        tripping) 0   TOTAL 0    If score of 4 or greater was education given? No           TABLE 2   Risk Score Risk Level Plan of Care   0-3 Little or  No Risk 1. Provide assistance as indicated for ambulation activities  2. Reorient confused/cognitively impaired patient  3. Chair/bed in low position, stretcher/bed with siderails up except when performing patient care activities  5. Educate patient/family/caregiver on falls prevention  6.  Reassess in 12 weeks or with any noted change in patient condition which places them at a risk for a fall   4-6 Moderate Risk 1. Provide assistance as indicated for ambulation activities  2. Reorient confused/cognitively impaired patient  3. Chair/bed in low position, stretcher/bed with siderails up except when performing patient care activities  4. Educate patient/family/caregiver on falls prevention     7 or   Higher High Risk 1. Place patient in easily observable treatment room  2. Patient attended at all times by family member or staff  3. Provide assistance as indicated for ambulation activities  4. Reorient confused/cognitively impaired patient  5. Chair/bed in low position, stretcher/bed with siderails up except when performing patient care activities  6. Educate patient/family/caregiver on falls prevention         PLAN: Patient is seen today in follow up.   She states her skin to her left breast feels different and is darker than prior to radiation treatments. She also reports cording in the area that she has seen PT for and was taught how to massage and states this isn't painful or bothersome to her. She also continues on Tamoxifen. Denies any other issues or concerns.           Vangie Rajan RN

## 2022-05-11 NOTE — TELEPHONE ENCOUNTER
avs from 5/11/22     rv in 4 months     rv on 9/14/22 @ 3:15pm.     Pt was given AVS and appt schedule    Electronically signed by Riky Carlson on 5/11/2022 at 2:15 PM

## 2022-05-11 NOTE — PROGRESS NOTES
Peter Gutiérrez                                                                                                                  5/11/2022  MRN:   [id number]  YOB: 1970  PCP:                           Anitra Lombardo MD  Referring Physician: No ref. provider found  Treating Physician Name: Nikky Gold MD      Reason for visit:  Chief Complaint   Patient presents with    Follow-up     review status of disease    Results     mammogram & labs   Discussed treatment plan. Toxicity check. Current problems:  Invasive lobular carcinoma of left breast, grade 2, ER positive strongly, [place] positive moderate, HER-2 not amplified, clinical stage T1c, N0, M0. Pathological stage T1 cN1 aM0  Oncotype recurrence score of 15  Left breast LCIS  Premenopausal status    Active and recent treatments:  Left breast lumpectomy with sentinel lymph node biopsy-7/2021  S/p adjuvant radiation therapy--, completed 9/2021  Tamoxifen--9/2021    Summary of Case/History:    Peter Gutiérrez a 45 y. o.female is a patient recently diagnosed with left breast lobular invasive cancer presents to the clinic to establish care and for further work-up and evaluation. Patient last mammogram done about 2 years ago was unremarkable but her latest mammogram showed asymmetrical density in the upper outer quadrant at 1230-1 o'clock position. This was further confirmed with ultrasound which showed a 1.7 cm x 1.3 cm mass. Patient underwent biopsy which confirmed a lobular cancer, grade 2. Ultrasound also showed suspicious lymph node however biopsy was negative for malignancy. Patient herself did not notice any palpable mass. Denies any breast pain nipple discharge denies any previous history of biopsy. Patient is premenopausal.    Patient age of menarche was 14 years. Age of first child was 31. Patient has 2 children. Patient is premenopausal.  She is not on birth control or hormonal medication. Does not smoke.   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 Patient has history of breast cancer in her mother at age of 58. Interim History:    Patient presents to the clinic for a follow-up visit and for toxicity check and to review results of her blood work-up. Patient has been tolerating treatment without any unexpected or severe side effects. Denies hospitalization or ER visit. Appetite is good. Weight is stable. Nausea is controlled. Patient does complain of some hot flashes but they are manageable. Denies any leg swelling. Denies any menstrual bleeding. During this visit patient's allergy, social, medical, surgical history and medications were reviewed and updated.     Past Medical History:   Denies history of hypertension diabetes mellitus    Past Surgical History:     Past Surgical History:   Procedure Laterality Date    BREAST BIOPSY Left 7/20/2021    LEFT BREAST LUMPECTOMY    SENTINEL LYMPH NODE BIOPSY  (@ 07:30 )  WITH FROZEN SECTION performed by Evie Mariano DO at [address] [facility] [place] Left 7/20/2021    LEFT BREAST ONCOPLASTIC RECONSTRUCTION   PECT BLOCK performed by Mary Beth Sharma MD at [address] MRI BREAST BX USING DEVICE LEFT Left 7/1/2021    MRI BREAST BX USING DEVICE LEFT 7/1/2021 [facility]    US BREAST NEEDLE BIOPSY LEFT Left 6/3/2021    US BREAST NEEDLE BIOPSY LEFT 6/3/2021 [address]    US LYMPH NODE BIOPSY  6/3/2021    US LYMPH NODE BIOPSY 6/3/2021 [address]    WISDOM TOOTH EXTRACTION         Patient Family Social History:    Family History   Problem Relation Age of Onset    Other Father         Gout    Breast Cancer Mother 59        ductal carcinoma lumpectomy w/ radiation   Connye Sole Migraines Mother     No Known Problems Brother     Breast Cancer Paternal Grandmother [de-identified]    Heart Attack Maternal Grandfather 76     Social History     Socioeconomic History    Marital status:      Spouse name: None    Number of children: None    Years of education: None    Highest education level: None   Occupational History    None   Tobacco Use    Smoking status: Never Smoker    Smokeless tobacco: Never Used   Substance and Sexual Activity    Alcohol use: No    Drug use: No    Sexual activity: Yes     Partners: Male   Other Topics Concern    None   Social History Narrative    None     Social Determinants of Health     Financial Resource Strain:     Difficulty of Paying Living Expenses: Not on file   Food Insecurity:     Worried About Running Out of Food in the Last Year: Not on file    Whitney of Food in the Last Year: Not on file   Transportation Needs:     Lack of Transportation (Medical): Not on file    Lack of Transportation (Non-Medical): Not on file   Physical Activity:     Days of Exercise per Week: Not on file    Minutes of Exercise per Session: Not on file   Stress:     Feeling of Stress : Not on file   Social Connections:     Frequency of Communication with Friends and Family: Not on file    Frequency of Social Gatherings with Friends and Family: Not on file    Attends [employer] Services: Not on file    Active Member of [address] [place] [address] or Organizations: Not on file    Attends Club or Organization Meetings: Not on file    Marital Status: Not on file   Intimate Partner Violence:     Fear of Current or Ex-Partner: Not on file    Emotionally Abused: Not on file    Physically Abused: Not on file    Sexually Abused: Not on file   Housing Stability:     Unable to Pay for Housing in the Last Year: Not on file    Number of [place] in the Last Year: Not on file    Unstable Housing in the Last Year: Not on file     Current Medications:  Patient does not currently take any prescription medication    Allergies:   Wound dressing adhesive    Review of Systems:    Constitutional: No fever or chills. No night sweats, no weight loss.   Positive hot flashes  Eyes: No eye discharge, double vision, or eye pain   HEENT: negative for sore mouth, sore throat, hoarseness and voice change   Respiratory: negative for cough , sputum, dyspnea, wheezing, hemoptysis, chest pain   Cardiovascular: negative for chest pain, dyspnea, palpitations, orthopnea, PND   Gastrointestinal: negative for nausea, vomiting, diarrhea, constipation, abdominal pain, Dysphagia, hematemesis and hematochezia   Genitourinary: negative for frequency, dysuria, nocturia, urinary incontinence, and hematuria   Integument: negative for rash, skin lesions, bruises.    Hematologic/Lymphatic: negative for easy bruising, bleeding, lymphadenopathy, or petechiae   Endocrine: negative for heat or cold intolerance,weight changes, change in bowel habits and hair loss   Musculoskeletal: negative for myalgias, arthralgias, pain, joint swelling,and bone pain   Neurological: negative for headaches, dizziness, seizures, weakness, numbness        Physical Exam:    Vitals: /78   Pulse 66   Temp 98.6 °F (37 °C) (Temporal)   Wt 146 lb (66.2 kg)   LMP 08/16/2021 (Within Weeks)   BMI 21.56 kg/m²   General appearance - well appearing, no in pain or distress  Mental status - AAO X3  Eyes - pupils equal and reactive, extraocular eye movements intact  Mouth - mucous membranes moist, pharynx normal without lesions  Neck - supple, no significant adenopathy  Lymphatics - no palpable lymphadenopathy, no hepatosplenomegaly  Chest - clear to auscultation, no wheezes, rales or rhonchi, symmetric air entry  Heart - normal rate, regular rhythm, normal S1, S2, no murmurs  Abdomen - soft, nontender, nondistended, no masses or organomegaly  Neurological - alert, oriented, normal speech, no focal findings or movement disorder noted  Extremities - peripheral pulses normal, no pedal edema, no clubbing or cyanosis  Skin - normal coloration and turgor, no rashes, no suspicious skin lesions noted       DATA:    Results for orders placed or performed during the hospital encounter of 07/20/21   HCG, SERUM, QUALITATIVE   Result Value Ref Range    hCG Qual NEGATIVE NEGATIVE   Surgical Pathology   Result Value Ref Range    Surgical Pathology Report       -- Diagnosis --    1. Left axillary sentinel lymph nodes    -  Positive for metastatic lobular carcinoma (1/3). 2.  Left breast, additional anterior margin:    -  Negative for carcinoma. 3.  Left breast, excisional lumpectomy:    -  Invasive lobular carcinoma, grade 2.    -  Carcinomas 1.2 cm.    -  Previously reported to be ER positive, DC positive and HER-2 not  amplified.    -  Pleomorphic lobular carcinoma in situ is present. -  Margins are free of involvement by carcinoma. WILL Crowe  **Electronically Signed Out**         rdd/7/22/2021       Clinical Information  Pre-op Diagnosis:  LEFT BREAST CARCINOMA   Operative Findings:  LEFT AXILLARY SENTINEL NODES; ADDITIONAL ANTERIOR  MARGIN  DOUBLE SUTURE BLACK IS NEW ANTERIOR MARGIN, LONG SINGLE BLACK  IS INFERIOR MARGIN; LEFT BREAST  DOUBLE SHORT BLACK SUTURE IS SUPERIOR  MARGIN, SINGLE LONG BLACK SUTURE IS LATERAL MARGIN, DOUBLE LONG BLACK  SUTURE IS MEDIAL MARGIN, BLUE SUTURE IS DEEP MARGIN  Operation Performe d:  LEFT BREAST LUMPECTOMY SENTINEL LYMPH NODE  BIOPSY WITH FROZEN SECTION AND POSSIBLE NODE DISSECTION, LEFT BREAST  ONCOPLASTIC RECONSTRUCTION WITH OSVALDO INCISIONAL WOUND VAC, PECT BLOCK    Source of Specimen  1: LEFT AXILLARY SENTINEL NODES  2: ADDITIONAL ANTERIOR MARGIN  3: LEFT BREAST    Gross Description  1. \"TERESITA SALGADO, LEFT AXILLARY SENTINEL NODES\" Received three  portions adipose. A fatty node is 1.0 cm, all as FS (AFS) with H&E  TP.  12.0 x 10.0 x 9.0 mm adipose has an 8.0 x 5.0 x 5.0 millimeters  node, all for FS (BFS). Third portion of adipose is 2.0 x 1.2 x 1.2  cm and lacks a grossly discernible node, all submitted for permanent,  \"C.\"  FS x 2, 3cs/NS. 2.  \"TERESITA SALGADO, ADDITIONAL ANTERIOR MARGIN\" 3.1 x 2.1 x 0.7 cm  (M-L x S-I x A-P) oriented portion of fibrofatty tissue.   The deep  margin is inked black, superior blue, inferior red and anterior green. Sectioning reveals fibrotic cut surfaces with no masses. Cassette  summary:  \"A-C\" specimen entirely seria lly submitted from medial to  lateral.  3. \"TERESITA DAMIAN, DOUBLE SHORT BLACK SUTURE IS SUPERIOR MARGIN,  SINGLE LONG BLACK SUTURE IS LATERAL MARGIN, DOUBLE LONG BLACK SUTURE  IS MEDIAL MARGIN, BLUE SUTURE IS DEEP MARGIN\" 7.1 x 6.6 x 2.8 cm (S-I  x M-L x A-P) oriented portion of fibrofatty tissue. The deep margin  is inked black, superior blue, inferior red and anterior green. Sectioning reveals a 1.2 x 1.0 x 0.9 cm ill-defined tan-white mass  lesion surrounded by dense fibrous tissue. Within the lesion is a  biopsy site with a spiral-shaped marker. The lesion is < 0.1 cm from  the anterior margin, 0.5 cm from the deep margin and at least 1.0 cm  from the remaining margins. Dense fibrous tissue extends to the  inferior margin, anterior margin and lateral margin. Cassette summary:   \"A-K\" representative sections sequentially submitted from medial to  lateral with the medial and lateral margins submitted as shaves and  the remaining margins perpendicular. Region of the lesion is i n  \"B-I. \"  All six margins are represented. tm    Intraoperative Diagnosis  FSDX:  Lymph nodes (X2), negative for carcinoma.  (RDD, 11:02)Note: A  small focus of infiltrating lobular carcinoma is present in one lymph  node present in tissue not sampled by frozen section (not present on  the frozen sections). Microscopic Description  1-3.      INVASIVE BREAST CARCINOMA         PROCEDURE: Lumpectomy, sentinel node biopsies                                      SPECIMEN LATERALITY & TUMOR SITE: Left breast, prior biopsy  designated 1230                   TUMOR SIZE (LARGEST INVASIVE COMPONENT): 1.2 x 1.0 x 0.9 cm       HISTOLOGIC TYPE OF INVASIVE CARCINOMA:       HISTOLOGIC GRADE (PUNEET)--       - GLANDULAR/TUBULAR DIFFERENTIATION SCORE: 3       - NUCLEAR PLEOMORPHISM SCORE: 2       - MITOTIC RATE SCORE: 1       - OVERALL GRADE (FROM PUNEET TOTAL SCORE): Grade 2 (of 3). Note: Part 3 shows pleomorphic lobular carcinoma in situ associated  with the invasive lobular carcinoma. Part 2 shows a focus of atypical  lobular hyperplasia. The case is discussed with Dr. Brad Batista. DUCTAL CARCINOMA IN SITU: Absent       TUMOR EXTENT       - SKIN (INVASION, SATELLITE FOCI): Not sampled       - NIPPLE: Not sampled       - SKELETAL MUSCLE: Not sampled         MARGINS - INVASIVE CARCINOMA -  - SITE(S) OF ALL POSITIVE MARGINS (SPECIFY EXTENT): Margins are free  of involvement by invasive carcinoma  - IF ALL NEGATIVE--  --SITE(S) AND DISTANCE TO CLOSEST:Invasive carcinoma: Closest margin  is 5 mm, distance to the deep margin. Next closest margin is 7 mm,  distance to the inferior margin. Anterior aspect in part 3 is free  but narrow at <1 mm, but >10 mm distant considering part 2 additional  anterior margin tissue. Pleomorphic lobular carcinoma in situ: All  margins are free of pleomorphic lobular carcinoma in situ, all >10 mm  distant. LYMPHOVASCULAR INVASION: Absent         REGIONAL LYMPH NODES -       - NUMBER EXAMINED (SENTINEL AND NON-SENTINEL): 3       - NUMB ER OF SENTINEL: 3 (a third node is found in the adipose tissue  not sampled by frozen section, cassette C, on permanent sections. Focus of metastatic carcinoma is present in this third node). - NUMBER WITH MACROMETASTASES: 1       - NUMBER WITH MICROMETASTASES:      0  - NUMBER WITH ISOLATED TUMOR CELLS: 0       - SIZE OF LARGEST METASTATIC DEPOSIT: Subcapsular focus with 4 mm  largest linear dimension       - EXTRANODAL EXTENSION: AbsentNote: Lymph nodes are evaluated through  levels and by control appropriate immunostaining with pancytokeratin.         TREATMENT EFFECT: RESPONSE TO PRE-SURGICAL  (NEOADJUVANT) THERAPY (IF APPLICABLE)--       - IN BREAST TISSUES: N/A       - IN LYMPH NODES:.  N/A DISTANT METASTASIS pM  (only if confirmed pathologically in this case): N/A    PATHOLOGIC STAGE CLASSIFICATION (pTNM):     pT1c  pN1a  (T and /or N descriptors used only if applicable)    CAP InvasiveBreast 4500 in conjunction with AJCC 8 ed. BIOMARKER  TESTING  BREAST CARCINOMA         ESTROGEN RECEPTOR*--   Previously determined to be positive (see ES , left breast  1230, 6/3/2021). PROGESTERONE RECEPTOR*--  Previously determined to be positive. HER2*--  HER2 PROTEIN EXPRESSION (IMMUNOHISTOCHEMISTRY):     N/A  HER2 GENE AMPLIFICATION (INSITU HYBRIDIZATION):     Previously  determined to be not amplified by FISH         COLD ISCHEMIA TIME (TARGET UP TO 60 MIN): Adequate  FIXATION TIME (TARGET 6-72 HRS): Adequate    *Information on biomarker regents:  Estrogen Receptor: If performed at [facility] laboratory: Immunostain  clone SP1 with a polymer based detection system, vendor [employer] (FDA cleared); evaluated by manual morphometry  (negative=less than 1% tumor cell nuclear staining; otherwise  positive); external control is reactive. Progesterone Receptor: If performed at [facility] laboratory: Immunostain  clone 1E2 with a polymer based detection system, vendor [employer] (FDA cleared);  evaluated by manual morphometry  (negative=less than 1% tumor cell nuclear staining; otherwise  positive); external control is reactive. ER/FL testing of decalcified specimens has not been validated. HER 2 insitu hybridization: FDA cleared, vendor [facility] [employer]. CAP BreastBiomarkers 1410 in conjunction with AJCC 8 ed. SURGICAL PATHOLOGY CONSULTATION       Patient Name: Lilian Kidd Med Rec: [id number]  Path Number: [id number]    [facility]  ANATOMIC PATHOLOGY  [address] [place] [address].   [place], [address]  [phone]  Fax: [phone]       Mammogram    Impression   Post treatment changes left breast within expected below limits.  No evidence   of malignancy either breast.  Advise six-month mammographic follow-up of the   left breast as part of a post lumpectomy monitoring protocol.       BREAST DENSITY SUMMARY C: The breasts are heterogeneously dense which may   obscure small masses.       BI-RADS 3       BIRADS:   BIRADS - CATEGORY 3       Findings are probably benign. A short interval follow-up left breast is   recommended in 6 months.       OVERALL ASSESSMENT -PROBABLY BENIGN.       A letter of notification will be sent to the patient regarding the results.                 Impression:  Invasive lobular carcinoma of left breast, grade 2, ER positive strongly, [place] positive moderate, HER-2 not amplified, clinical stage T1c, N0, M0. Pathological stage T1 cN1 aM0  Recurrence score of 15  Left breast LCIS  Axillary lymph node cortical thickening  Premenopausal status    Plan:  I had a detailed discussion with the patient and personally went over results of lab work-up imaging studies and other relevant clinical data  Toxicity check performed. Patient is overall tolerating treatment with tamoxifen without unexpected or severe side effects. Hot flashes continue but they are not severe  Reiterated treatment plan. We will continue to check hormonal status periodically and transition to Coumadin because inhibitor once patient is postmenopausal.  Plan for at least 5 years of adjuvant hormonal therapy  Reviewed potential side effects including risk of endometrial cancer as well as venous thromboembolism. Reviewed results of mammogram.  She is scheduled for breast MRI in September. We will follow-up on results. Recommend annual pelvic exam  NCCN guidelines were reviewed and discussed with the patient. The diagnosis and care plan were discussed with the patient in detail.  I discussed the natural history of the disease, prognosis, risks and goals of therapy and answered all the patients questions to the best of my ability. Patient expressed understanding and was in agreement. Roro Bingham MD          This note is created with the assistance of a speech recognition program.  While intending to generate a document that actually reflects the content of the visit, the document can still have some errors including those of syntax and sound a like substitutions which may escape proof reading. It such instances, actual meaning can be extrapolated by contextual diversion.

## 2022-05-13 NOTE — PROGRESS NOTES
[address]            Radiation Oncology          [address] [facility]          [address]        Isabel Bear: [phone]        F: [phone]       [employer] [address] [place] [address]       Radiation Oncology   [address] [facility], [address] [facility]       Isabel Bear: [phone]       F: [phone]       [email or web address]      Date of Service: 2022     Location:  [address] [place] [address],   [address] [place] [address]  [facility]., [address] [place]   [phone]        RADIATION ONCOLOGY FOLLOW UP NOTE    Patient ID:   Sonido Hawkins  : 1970   MRN: [id number]    DIAGNOSIS:  Cancer Staging  Malignant neoplasm of upper-outer quadrant of left breast in female, estrogen receptor positive ([facility] [address]  Staging form: Breast, AJCC 8th Edition  - Clinical stage from 6/3/2021: Stage IA (cT1c, cN0, cM0, G2, ER+, LA+, HER2-) - Signed by Flori Serna MD on 2021  - Pathologic stage from 2021: Stage IA (pT1c, pN1a(sn), cM0, G2, ER+, LA+, HER2-) - Signed by Flori Serna MD on 2021    -s/p Lump SLN 21  -Oncotype 15  -s/p RT 42.56 Gy 21  -on Tamoxifen    INTERVAL HISTORY:   Sonido Hawkins is a 45 [facility] with a history of a left-sided breast cancer treated with lumpectomy followed by adjuvant course of radiation therapy completing 9 months ago. Patient comes in today for routine follow-up visit exam.  Overall she is doing well with no acute complaints. She is on tamoxifen for endocrine therapy and is tolerating well with some mild hot flashes. She notes no suspicious lumps bumps or nipple discharge in her breast.  She does continue to work on her range of motion with PT. She denies any headaches, dizziness, chest pain, shortness of breath, abdominal pain, nausea, diarrhea, bleeding, swelling, bleeding, weight loss, or fatigue.   She had a mammogram on 2022 which was a BI-RADS 3 with recommended follow-up of the left breast in 6 months. MEDICATIONS:    Current Outpatient Medications:     Efinaconazole (JUBLIA) 10 % SOLN, Apply 1 Applicatorful topically daily, Disp: 8 mL, Rfl: 3    tamoxifen (NOLVADEX) 20 MG tablet, Take 1 tablet by mouth daily, Disp: 90 tablet, Rfl: 1    Multiple Vitamins-Minerals (THERAPEUTIC MULTIVITAMIN-MINERALS) tablet, Take 1 tablet by mouth daily, Disp: , Rfl:     ALLERGIES:  Allergies   Allergen Reactions    Wound Dressing Adhesive Rash     Steri strips          REVIEW OF SYSTEMS:    A full 14 point review of systems was performed and assessed and found to be negative except as noted above. PHYSICAL EXAMINATION:    CHAPERONE: Not Required    ECO Asymptomatic    VITAL SIGNS: /78   Pulse 66   Temp 98.6 °F (37 °C)   Resp 14   Wt 146 lb 6.4 oz (66.4 kg)   LMP 2021 (Within Weeks)   SpO2 98%   BMI 21.62 kg/m²   GENERAL:  General appearance is that of a well-nourished, well-developed in no apparent distress. HEENT: Normocephalic, atraumatic, EOMI, moist mucosa, no erythema. NECK:  No adenopathy or a palpable thyroid mass, trachea is midline. LYMPHATICS: No cervical, supraclavicular adenopathy. HEART:  Normal rate and regular rhythm, normal heart sounds. LUNGS:  Pulmonary effort normal. Normal breath sounds. ABDOMEN:  Soft, nontender, non distended. EXTREMITIES:  No edema. No calf tenderness. MSK:  No spinal tenderness. Normal ROM. NEUROLOGICAL: Alert and oriented. Strength and sensation intact bilaterally. No focal deficits. PSYCH: Mood normal, behavior normal.  SKIN: No erythema, no desquamation.   BREAST: Deferred       LABS:  WBC   Date Value Ref Range Status   2022 7.7 3.5 - 11.0 k/uL Final     Segs Absolute   Date Value Ref Range Status   2022 5.40 1.8 - 7.7 k/uL Final     Hemoglobin   Date Value Ref Range Status   2022 13.8 12.0 - 16.0 g/dL Final     Platelets   Date Value Ref Range Status   2022 268 140 - 450 k/uL Final     No results found for: , CEA  No results found for: PSA    IMAGING:   3/28/22 B/L Mammogram  Impression   Post treatment changes left breast within expected below limits.  No evidence   of malignancy either breast.  Advise six-month mammographic follow-up of the   left breast as part of a post lumpectomy monitoring protocol.       BREAST DENSITY SUMMARY C: The breasts are heterogeneously dense which may   obscure small masses.       BI-RADS 3           ASSESSMENT AND PLAN:  Lauro Cordoba is a 45 y.o. female with a Cancer Staging  Malignant neoplasm of upper-outer quadrant of left breast in female, estrogen receptor positive ([facility] [address]  Staging form: Breast, AJCC 8th Edition  - Clinical stage from 6/3/2021: Stage IA (cT1c, cN0, cM0, G2, ER+, SC+, HER2-) - Signed by Paco Nathan MD on 6/21/2021  - Pathologic stage from 7/20/2021: Stage IA (pT1c, pN1a(sn), cM0, G2, ER+, SC+, HER2-) - Signed by Paco Nathan MD on 8/19/2021  . Patient comes in today for follow-up visit presently 9 months after completion of adjuvant radiation therapy. She is doing well with no significant toxicities remaining from her radiation treatments. Clinically patient is doing well with no signs of recurrence, and had a mammogram which showed some posttreatment changes in the left breast though follow-up imaging was recommended in 6 months as she does have postlumpectomy and treatment changes as well as heterogeneously dense tissue. Patient will continue on her endocrine therapy as recommended by medical oncology and follow-up with us after her next set of imaging. Patient is recommended to come back in 4 months for another follow up visit and exam, or can call to come see us sooner if symptoms change. Patient was in agreement with my recommendations. All questions were answered to their satisfaction. Patient was advised to contact us anytime should they have any questions or concerns.          Electronically signed by Munira Tello Ariadne Brown MD on 5/13/2022 at 11:39 AM        Medications Prescribed:   New Prescriptions    No medications on file       Orders:   Orders Placed This Encounter   Procedures    MRI BREAST BILATERAL W WO CONTRAST       CC:  Patient Care Team:  Adama Zhang MD as PCP - General (Family Medicine)  Adama Zhang MD as PCP - [facility]     Total time spent on this case on the day of encounter is 30 minutes. This time includes combination of one or more of the following - review of necessary tests, review of pertinent medical records from the EMR, performing medically appropriate examination and evaluation, counseling and educating the patient/family/caregiver, ordering necessary medical tests, procedures etc., documenting the clinical information in the electronic medical record, care coordination, referring and communicating with other health care providers and interpretation of results independently. This note is created with the assistance of a speech recognition program.  While intending to generate a document that actually reflects the content of the visit, the document can still have some errors including those of syntax and sound a like substitutions which may escape proof reading. It such instances, actual meaning can be extrapolated by contextual diversion.

## 2022-07-20 PROBLEM — C50.912 BREAST CANCER, LEFT (HCC): Status: ACTIVE | Noted: 2022-07-20

## 2022-07-20 PROBLEM — C50.912 BREAST CANCER, LEFT (HCC): Status: RESOLVED | Noted: 2022-07-20 | Resolved: 2022-07-20

## 2022-09-14 ENCOUNTER — OFFICE VISIT (OUTPATIENT)
Dept: ONCOLOGY | Age: 52
End: 2022-09-14
Payer: COMMERCIAL

## 2022-09-14 ENCOUNTER — TELEPHONE (OUTPATIENT)
Dept: ONCOLOGY | Age: 52
End: 2022-09-14

## 2022-09-14 VITALS
WEIGHT: 146.2 LBS | TEMPERATURE: 96.9 F | HEART RATE: 73 BPM | DIASTOLIC BLOOD PRESSURE: 75 MMHG | BODY MASS INDEX: 21.59 KG/M2 | SYSTOLIC BLOOD PRESSURE: 108 MMHG

## 2022-09-14 DIAGNOSIS — Z17.0 MALIGNANT NEOPLASM OF UPPER-OUTER QUADRANT OF LEFT BREAST IN FEMALE, ESTROGEN RECEPTOR POSITIVE (HCC): ICD-10-CM

## 2022-09-14 DIAGNOSIS — Z80.3 FAMILY HISTORY OF BREAST CANCER: ICD-10-CM

## 2022-09-14 DIAGNOSIS — Z17.0 MALIGNANT NEOPLASM OF UPPER-OUTER QUADRANT OF LEFT BREAST IN FEMALE, ESTROGEN RECEPTOR POSITIVE (HCC): Primary | ICD-10-CM

## 2022-09-14 DIAGNOSIS — C50.412 MALIGNANT NEOPLASM OF UPPER-OUTER QUADRANT OF LEFT BREAST IN FEMALE, ESTROGEN RECEPTOR POSITIVE (HCC): ICD-10-CM

## 2022-09-14 DIAGNOSIS — D05.02 BREAST NEOPLASM, TIS (LCIS), LEFT: ICD-10-CM

## 2022-09-14 DIAGNOSIS — Z79.810 LONG-TERM CURRENT USE OF TAMOXIFEN: ICD-10-CM

## 2022-09-14 DIAGNOSIS — C50.412 MALIGNANT NEOPLASM OF UPPER-OUTER QUADRANT OF LEFT BREAST IN FEMALE, ESTROGEN RECEPTOR POSITIVE (HCC): Primary | ICD-10-CM

## 2022-09-14 PROCEDURE — 99211 OFF/OP EST MAY X REQ PHY/QHP: CPT | Performed by: INTERNAL MEDICINE

## 2022-09-14 PROCEDURE — 99214 OFFICE O/P EST MOD 30 MIN: CPT | Performed by: INTERNAL MEDICINE

## 2022-09-14 RX ORDER — MAGNESIUM 30 MG
30 TABLET ORAL 2 TIMES DAILY
COMMUNITY

## 2022-09-14 RX ORDER — MULTIVIT WITH MINERALS/LUTEIN
1000 TABLET ORAL DAILY
COMMUNITY

## 2022-09-14 RX ORDER — TAMOXIFEN CITRATE 20 MG/1
20 TABLET ORAL DAILY
Qty: 90 TABLET | Refills: 1 | Status: SHIPPED | OUTPATIENT
Start: 2022-09-14 | End: 2022-12-13

## 2022-09-14 NOTE — PROGRESS NOTES
Jean Becerra                                                                                                                  9/14/2022  MRN:   [id number]  YOB: 1970  PCP:                           Alin Rizo MD  Referring Physician: No ref. provider found  Treating Physician Name: Jayden Landin MD      Reason for visit:  Chief Complaint   Patient presents with    Follow-up     Review status of disease   Discussed treatment plan. Toxicity check. Current problems:  Invasive lobular carcinoma of left breast, grade 2, ER positive strongly, IN positive moderate, HER-2 not amplified, clinical stage T1c, N0, M0. Pathological stage T1 cN1 aM0  Oncotype recurrence score of 15  Left breast LCIS  Premenopausal status    Active and recent treatments:  Left breast lumpectomy with sentinel lymph node biopsy-7/2021  S/p adjuvant radiation therapy--, completed 9/2021  Tamoxifen--9/2021    Summary of Case/History:    Jean Becerra a 45 y. o.female is a patient recently diagnosed with left breast lobular invasive cancer presents to the clinic to establish care and for further work-up and evaluation. Patient last mammogram done about 2 years ago was unremarkable but her latest mammogram showed asymmetrical density in the upper outer quadrant at 1230-1 o'clock position. This was further confirmed with ultrasound which showed a 1.7 cm x 1.3 cm mass. Patient underwent biopsy which confirmed a lobular cancer, grade 2. Ultrasound also showed suspicious lymph node however biopsy was negative for malignancy. Patient herself did not notice any palpable mass. Denies any breast pain nipple discharge denies any previous history of biopsy. Patient is premenopausal.    Patient age of menarche was 14 years. Age of first child was 31. Patient has 2 children. Patient is premenopausal.  She is not on birth control or hormonal medication. Does not smoke.   She works as a nursing aide at the Medications:  Patient does not currently take any prescription medication    Allergies:   Wound dressing adhesive    Review of Systems:    Constitutional: No fever or chills. No night sweats, no weight loss. Positive hot flashes  Eyes: No eye discharge, double vision, or eye pain   HEENT: negative for sore mouth, sore throat, hoarseness and voice change   Respiratory: negative for cough , sputum, dyspnea, wheezing, hemoptysis, chest pain   Cardiovascular: negative for chest pain, dyspnea, palpitations, orthopnea, PND   Gastrointestinal: negative for nausea, vomiting, diarrhea, constipation, abdominal pain, Dysphagia, hematemesis and hematochezia   Genitourinary: negative for frequency, dysuria, nocturia, urinary incontinence, and hematuria   Integument: negative for rash, skin lesions, bruises.    Hematologic/Lymphatic: negative for easy bruising, bleeding, lymphadenopathy, or petechiae   Endocrine: negative for heat or cold intolerance,weight changes, change in bowel habits and hair loss   Musculoskeletal: negative for myalgias, arthralgias, pain, joint swelling,and bone pain   Neurological: negative for headaches, dizziness, seizures, weakness, numbness        Physical Exam:    Vitals: /75   Pulse 73   Temp 96.9 °F (36.1 °C) (Temporal)   Wt 146 lb 3.2 oz (66.3 kg)   LMP 08/16/2021 (Within Weeks)   BMI 21.59 kg/m²   General appearance - well appearing, no in pain or distress  Mental status - AAO X3  Eyes - pupils equal and reactive, extraocular eye movements intact  Mouth - mucous membranes moist, pharynx normal without lesions  Neck - supple, no significant adenopathy  Lymphatics - no palpable lymphadenopathy, no hepatosplenomegaly  Chest - clear to auscultation, no wheezes, rales or rhonchi, symmetric air entry  Heart - normal rate, regular rhythm, normal S1, S2, no murmurs  Abdomen - soft, nontender, nondistended, no masses or organomegaly  Neurological - alert, oriented, normal speech, no focal findings or movement disorder noted  Extremities - peripheral pulses normal, no pedal edema, no clubbing or cyanosis  Skin - normal coloration and turgor, no rashes, no suspicious skin lesions noted       DATA:  Lab Results   Component Value Date    WBC 7.7 05/11/2022    HGB 13.8 05/11/2022    HCT 39.6 05/11/2022    MCV 92.8 05/11/2022     05/11/2022       Chemistry        Component Value Date/Time     05/11/2022 1300    K 3.9 05/11/2022 1300     05/11/2022 1300    CO2 27 05/11/2022 1300    BUN 15 05/11/2022 1300    CREATININE 0.83 05/11/2022 1300        Component Value Date/Time    CALCIUM 9.7 05/11/2022 1300    ALKPHOS 146 (H) 05/11/2022 1300    AST 30 05/11/2022 1300    ALT 14 05/11/2022 1300    BILITOT 0.31 05/11/2022 1300        No results found. Impression:  Invasive lobular carcinoma of left breast, grade 2, ER positive strongly, [place] positive moderate, HER-2 not amplified, clinical stage T1c, N0, M0. Pathological stage T1 cN1 aM0  Recurrence score of 15  Left breast LCIS  Axillary lymph node cortical thickening  Premenopausal status    Plan:  I completed toxicity check. She has mild but manageable hot flashes. She has breast MRI in a few week. We had discussion regarding her menopausal status, with absent menses for 1 year I discussed plan to hold Tamoxifen for 1 month and check her hormones in 30 days and I am putting in orders, if she is post menopausal we will transition to AI, otherwise resume Tamoxifen. We reviewed plan for minimum 5 years of anti-estrogen and goals of therapy. Return in 5 weeks. NCCN guidelines were reviewed and discussed with the patient. The diagnosis and care plan were discussed with the patient in detail. I discussed the natural history of the disease, prognosis, risks and goals of therapy and answered all the patients questions to the best of my ability. Patient expressed understanding and was in agreement.     Scribe Attestation   This note was created

## 2022-09-14 NOTE — TELEPHONE ENCOUNTER
AVS from 9/14/22      Labs in 1 month   Rv after labs       Rv scheduled 10/19 @ 3:15 pm     Pt will have labs drawn one week prior to RV      Pt was given AVS and appointment schedule    Electronically signed by Katia Stallings on 9/14/2022 at 4:01 PM

## 2022-10-12 ENCOUNTER — HOSPITAL ENCOUNTER (OUTPATIENT)
Dept: MRI IMAGING | Age: 52
Discharge: HOME | End: 2022-10-14
Payer: COMMERCIAL

## 2022-10-12 DIAGNOSIS — Z17.0 MALIGNANT NEOPLASM OF UPPER-OUTER QUADRANT OF LEFT BREAST IN FEMALE, ESTROGEN RECEPTOR POSITIVE (HCC): ICD-10-CM

## 2022-10-12 DIAGNOSIS — C50.412 MALIGNANT NEOPLASM OF UPPER-OUTER QUADRANT OF LEFT BREAST IN FEMALE, ESTROGEN RECEPTOR POSITIVE (HCC): ICD-10-CM

## 2022-10-12 PROCEDURE — A9579 GAD-BASE MR CONTRAST NOS,1ML: HCPCS | Performed by: RADIOLOGY

## 2022-10-12 PROCEDURE — C8908 MRI W/O FOL W/CONT, BREAST,: HCPCS

## 2022-10-12 PROCEDURE — 2580000003 HC RX 258: Performed by: RADIOLOGY

## 2022-10-12 PROCEDURE — 6360000004 HC RX CONTRAST MEDICATION: Performed by: RADIOLOGY

## 2022-10-12 RX ORDER — 0.9 % SODIUM CHLORIDE 0.9 %
40 INTRAVENOUS SOLUTION INTRAVENOUS ONCE
Status: COMPLETED | OUTPATIENT
Start: 2022-10-12 | End: 2022-10-12

## 2022-10-12 RX ORDER — SODIUM CHLORIDE 0.9 % (FLUSH) 0.9 %
10 SYRINGE (ML) INJECTION PRN
Status: DISCONTINUED | OUTPATIENT
Start: 2022-10-12 | End: 2022-10-15 | Stop reason: HOSPADM

## 2022-10-12 RX ADMIN — SODIUM CHLORIDE, PRESERVATIVE FREE 10 ML: 5 INJECTION INTRAVENOUS at 14:35

## 2022-10-12 RX ADMIN — GADOTERIDOL 15 ML: 279.3 INJECTION, SOLUTION INTRAVENOUS at 14:34

## 2022-10-12 RX ADMIN — SODIUM CHLORIDE 40 ML: 9 INJECTION, SOLUTION INTRAVENOUS at 14:31

## 2022-10-13 ENCOUNTER — HOSPITAL ENCOUNTER (OUTPATIENT)
Age: 52
Discharge: HOME | End: 2022-10-13
Payer: COMMERCIAL

## 2022-10-13 DIAGNOSIS — C50.412 MALIGNANT NEOPLASM OF UPPER-OUTER QUADRANT OF LEFT BREAST IN FEMALE, ESTROGEN RECEPTOR POSITIVE (HCC): ICD-10-CM

## 2022-10-13 DIAGNOSIS — Z17.0 MALIGNANT NEOPLASM OF UPPER-OUTER QUADRANT OF LEFT BREAST IN FEMALE, ESTROGEN RECEPTOR POSITIVE (HCC): ICD-10-CM

## 2022-10-13 LAB
ESTRADIOL LEVEL: <5 PG/ML (ref 27–314)
FOLLICLE STIMULATING HORMONE: 61.2 MIU/ML (ref 1.7–21.5)

## 2022-10-13 PROCEDURE — 82670 ASSAY OF TOTAL ESTRADIOL: CPT

## 2022-10-13 PROCEDURE — 83001 ASSAY OF GONADOTROPIN (FSH): CPT

## 2022-10-13 PROCEDURE — 36415 COLL VENOUS BLD VENIPUNCTURE: CPT

## 2022-10-19 ENCOUNTER — OFFICE VISIT (OUTPATIENT)
Dept: ONCOLOGY | Age: 52
End: 2022-10-19
Payer: COMMERCIAL

## 2022-10-19 ENCOUNTER — HOSPITAL ENCOUNTER (OUTPATIENT)
Dept: RADIATION ONCOLOGY | Age: 52
Discharge: HOME | End: 2022-10-19
Attending: RADIOLOGY
Payer: COMMERCIAL

## 2022-10-19 ENCOUNTER — TELEPHONE (OUTPATIENT)
Dept: ONCOLOGY | Age: 52
End: 2022-10-19

## 2022-10-19 VITALS
DIASTOLIC BLOOD PRESSURE: 71 MMHG | WEIGHT: 148.4 LBS | SYSTOLIC BLOOD PRESSURE: 104 MMHG | HEART RATE: 55 BPM | BODY MASS INDEX: 21.91 KG/M2 | TEMPERATURE: 98.1 F | RESPIRATION RATE: 16 BRPM

## 2022-10-19 VITALS
HEART RATE: 55 BPM | TEMPERATURE: 98.1 F | BODY MASS INDEX: 21.86 KG/M2 | DIASTOLIC BLOOD PRESSURE: 71 MMHG | WEIGHT: 148 LBS | SYSTOLIC BLOOD PRESSURE: 104 MMHG

## 2022-10-19 DIAGNOSIS — Z17.0 MALIGNANT NEOPLASM OF UPPER-OUTER QUADRANT OF LEFT BREAST IN FEMALE, ESTROGEN RECEPTOR POSITIVE (HCC): Primary | ICD-10-CM

## 2022-10-19 DIAGNOSIS — Z79.811 AROMATASE INHIBITOR USE: ICD-10-CM

## 2022-10-19 DIAGNOSIS — C50.412 MALIGNANT NEOPLASM OF UPPER-OUTER QUADRANT OF LEFT BREAST IN FEMALE, ESTROGEN RECEPTOR POSITIVE (HCC): Primary | ICD-10-CM

## 2022-10-19 PROCEDURE — 99214 OFFICE O/P EST MOD 30 MIN: CPT | Performed by: INTERNAL MEDICINE

## 2022-10-19 PROCEDURE — 99211 OFF/OP EST MAY X REQ PHY/QHP: CPT | Performed by: INTERNAL MEDICINE

## 2022-10-19 PROCEDURE — 99214 OFFICE O/P EST MOD 30 MIN: CPT | Performed by: RADIOLOGY

## 2022-10-19 PROCEDURE — 99212 OFFICE O/P EST SF 10 MIN: CPT | Performed by: RADIOLOGY

## 2022-10-19 RX ORDER — B-COMPLEX WITH VITAMIN C
1 TABLET ORAL DAILY
Qty: 180 TABLET | Refills: 1 | Status: SHIPPED | OUTPATIENT
Start: 2022-10-19 | End: 2023-01-17

## 2022-10-19 RX ORDER — LETROZOLE 2.5 MG/1
2.5 TABLET, FILM COATED ORAL DAILY
Qty: 90 TABLET | Refills: 1 | Status: SHIPPED | OUTPATIENT
Start: 2022-10-19 | End: 2023-01-17

## 2022-10-19 NOTE — TELEPHONE ENCOUNTER
Patient mailed AVS w/instructions to schedule Dexa Scan prior to f/u appt  F/u appt Dr Alice Kenny 1-25-23

## 2022-10-19 NOTE — PROGRESS NOTES
fall risk score from Table 2.  Reassess patient at a minimum every 12 weeks or with status change. Assessment   Date  10/19/2022     1. Mental Ability: confusion/cognitively impaired 0     2. Elimination Issues: incontinence, frequency 0       3. Ambulatory: use of assistive devices (walker, cane, off-loading devices),        attached to equipment (IV pole, oxygen) 0     4. Sensory Limitations: dizziness, vertigo, impaired vision 0     5. Age less than 65        0     6. Age 72 or greater 0     7. Medication: diuretics, strong analgesics, hypnotics, sedatives,        antihypertensive agents 0   8. Falls:  recent history of falls within the last 3 months (not to include slipping or        tripping) 0   TOTAL 0    If score of 4 or greater was education given? No           TABLE 2   Risk Score Risk Level Plan of Care   0-3 Little or  No Risk 1. Provide assistance as indicated for ambulation activities  2. Reorient confused/cognitively impaired patient  3. Chair/bed in low position, stretcher/bed with siderails up except when performing patient care activities  5. Educate patient/family/caregiver on falls prevention  6.  Reassess in 12 weeks or with any noted change in patient condition which places them at a risk for a fall   4-6 Moderate Risk 1. Provide assistance as indicated for ambulation activities  2. Reorient confused/cognitively impaired patient  3. Chair/bed in low position, stretcher/bed with siderails up except when performing patient care activities  4. Educate patient/family/caregiver on falls prevention     7 or   Higher High Risk 1. Place patient in easily observable treatment room  2. Patient attended at all times by family member or staff  3. Provide assistance as indicated for ambulation activities  4. Reorient confused/cognitively impaired patient  5. Chair/bed in low position, stretcher/bed with siderails up except when performing patient care activities  6.   Educate patient/family/caregiver on falls prevention         PLAN: Patient is seen today in follow up. She stopped her Tamoxifen about a month ago and anticipates starting a new medication today after her appointment with her medical oncologist.  Dr. Palmer Gonzales to review results of MRI breasts.           Edgard Denise RN

## 2022-10-19 NOTE — PROGRESS NOTES
Raphael Almonte                                                                                                                  10/19/2022  MRN:   [id number]  YOB: 1970  PCP:                           Hailey Vidales MD  Referring Physician: No ref. provider found  Treating Physician Name: Travis Redding MD      Reason for visit:  Chief Complaint   Patient presents with    Follow-up     Review status of disease    Discuss Labs    Other     Seen Dr. Garo Garcia for MRI results    Discussed treatment plan. Toxicity check. Current problems:  Invasive lobular carcinoma of left breast, grade 2, ER positive strongly, WI positive moderate, HER-2 not amplified, clinical stage T1c, N0, M0. Pathological stage T1 cN1 aM0  Oncotype recurrence score of 15  Left breast LCIS  Postmenopausal status confirmed-10/2022    Active and recent treatments:  Left breast lumpectomy with sentinel lymph node biopsy-7/2021  S/p adjuvant radiation therapy--, completed 9/2021  Tamoxifen--9/2021, discontinued 09/2022  Plan for Femara     Summary of Case/History:    Raphael Almonte a 45 y. o.female is a patient recently diagnosed with left breast lobular invasive cancer presents to the clinic to establish care and for further work-up and evaluation. Patient last mammogram done about 2 years ago was unremarkable but her latest mammogram showed asymmetrical density in the upper outer quadrant at 1230-1 o'clock position. This was further confirmed with ultrasound which showed a 1.7 cm x 1.3 cm mass. Patient underwent biopsy which confirmed a lobular cancer, grade 2. Ultrasound also showed suspicious lymph node however biopsy was negative for malignancy. Patient herself did not notice any palpable mass. Denies any breast pain nipple discharge denies any previous history of biopsy. Patient is premenopausal.    Patient age of menarche was 14 years. Age of first child was 31. Patient has 2 children.   Patient is premenopausal.  She is not on birth control or hormonal medication. Does not smoke. 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 Patient has history of breast cancer in her mother at age of 58. Interim History:    Patient presents to the clinic for a follow-up visit and for toxicity check with breast cancer management. She has discontinued Tamoxifen as discussed. During this visit patient's allergy, social, medical, surgical history and medications were reviewed and updated.     Past Medical History:   Denies history of hypertension diabetes mellitus    Past Surgical History:     Past Surgical History:   Procedure Laterality Date    BREAST BIOPSY Left 7/20/2021    LEFT BREAST LUMPECTOMY    SENTINEL LYMPH NODE BIOPSY  (@ 07:30 )  WITH FROZEN SECTION performed by Jane Lomeli DO at [facility] Left 7/20/2021    LEFT BREAST ONCOPLASTIC RECONSTRUCTION   PECT BLOCK performed by Silverio Rubalcava MD at [facility] USING DEVICE LEFT Left 7/1/2021    MRI BREAST BX USING DEVICE LEFT 7/1/2021 [facility]    US BREAST NEEDLE BIOPSY LEFT Left 6/3/2021    US BREAST NEEDLE BIOPSY LEFT 6/3/2021 [facility] LYMPH NODE BIOPSY  6/3/2021    US LYMPH NODE BIOPSY 6/3/2021 [address] [facility]    WISDOM TOOTH EXTRACTION         Patient Family Social History:    Family History   Problem Relation Age of Onset    Other Father         Gout    Breast Cancer Mother 59        ductal carcinoma lumpectomy w/ radiation    Migraines Mother     No Known Problems Brother     Breast Cancer Paternal Grandmother [de-identified]    Heart Attack Maternal Grandfather 76     Social History     Socioeconomic History    Marital status:      Spouse name: None    Number of children: None    Years of education: None    Highest education level: None   Tobacco Use    Smoking status: Never    Smokeless tobacco: Never   Substance and Sexual Activity    Alcohol use: No    Drug use: No    Sexual activity: Yes Partners: Male     Current Medications:  Patient does not currently take any prescription medication    Allergies:   Wound dressing adhesive    Review of Systems:    Constitutional: No fever or chills. No night sweats, no weight loss. Positive hot flashes  Eyes: No eye discharge, double vision, or eye pain   HEENT: negative for sore mouth, sore throat, hoarseness and voice change   Respiratory: negative for cough , sputum, dyspnea, wheezing, hemoptysis, chest pain   Cardiovascular: negative for chest pain, dyspnea, palpitations, orthopnea, PND   Gastrointestinal: negative for nausea, vomiting, diarrhea, constipation, abdominal pain, Dysphagia, hematemesis and hematochezia   Genitourinary: negative for frequency, dysuria, nocturia, urinary incontinence, and hematuria   Integument: negative for rash, skin lesions, bruises.    Hematologic/Lymphatic: negative for easy bruising, bleeding, lymphadenopathy, or petechiae   Endocrine: negative for heat or cold intolerance,weight changes, change in bowel habits and hair loss   Musculoskeletal: negative for myalgias, arthralgias, pain, joint swelling,and bone pain   Neurological: negative for headaches, dizziness, seizures, weakness, numbness        Physical Exam:    Vitals: /71   Pulse 55   Temp 98.1 °F (36.7 °C) (Oral)   Wt 148 lb (67.1 kg)   LMP 08/16/2021 (Within Weeks)   BMI 21.86 kg/m²   General appearance - well appearing, no in pain or distress  Mental status - AAO X3  Eyes - pupils equal and reactive, extraocular eye movements intact  Mouth - mucous membranes moist, pharynx normal without lesions  Neck - supple, no significant adenopathy  Lymphatics - no palpable lymphadenopathy, no hepatosplenomegaly  Chest - clear to auscultation, no wheezes, rales or rhonchi, symmetric air entry  Heart - normal rate, regular rhythm, normal S1, S2, no murmurs  Abdomen - soft, nontender, nondistended, no masses or organomegaly  Neurological - alert, oriented, normal speech, no focal findings or movement disorder noted  Extremities - peripheral pulses normal, no pedal edema, no clubbing or cyanosis  Skin - normal coloration and turgor, no rashes, no suspicious skin lesions noted       DATA:  Lab Results   Component Value Date    WBC 7.7 05/11/2022    HGB 13.8 05/11/2022    HCT 39.6 05/11/2022    MCV 92.8 05/11/2022     05/11/2022       Chemistry        Component Value Date/Time     05/11/2022 1300    K 3.9 05/11/2022 1300     05/11/2022 1300    CO2 27 05/11/2022 1300    BUN 15 05/11/2022 1300    CREATININE 0.83 05/11/2022 1300        Component Value Date/Time    CALCIUM 9.7 05/11/2022 1300    ALKPHOS 146 (H) 05/11/2022 1300    AST 30 05/11/2022 1300    ALT 14 05/11/2022 1300    BILITOT 0.31 05/11/2022 1300        MRI BREAST BILATERAL W WO CONTRAST    Result Date: 10/13/2022  EXAMINATION: MRI OF THE BILATERAL BREASTS WITHOUT AND WITH CONTRAST 10/12/2022 2:28 pm: TECHNIQUE: Multiplanar multisequence MRI of the bilateral breasts were performed without and with the administration of intravenous contrast. Data analysis was performed with color parametric mapping, image subtraction, and 3D reconstructions. COMPARISON: Prior comparison MRI study dated 06/18/2021. Prior mammogram dated 03/28/2022. HISTORY: ORDERING SYSTEM PROVIDED HISTORY: Malignant neoplasm of upper-outer quadrant of left breast in female, estrogen receptor positive ([facility] [address] TECHNOLOGIST PROVIDED HISTORY: STAT Creatinine as needed:->Yes Reason for Exam: Malignant neoplasm of upper-outer quadrant of left breast in female, estrogen receptor positive 77-year-old female with history of invasive lobular carcinoma of the left breast status post lumpectomy and radiation in 2021. Patient presents for surveillance MRI study. FINDINGS: Breast parenchyma: Heterogeneously dense. Minimal background parenchymal enhancement symmetrical. Right breast: No evidence of abnormal enhancing mass or non mass enhancement. Small enhancing foci in the retroareolar right breast without suspicious kinetics similar to prior study consistent with benign etiology. The nipple/areolar complex and chest wall unremarkable. No evidence of lymphadenopathy. Left breast: Expected post treatment changes in the superior and superolateral left breast including expected architectural distortion and surgical clips. No evidence of suspicious enhancing mass or non mass enhancement. The nipple/areolar complex and chest wall unremarkable. No evidence of lymphadenopathy. No MRI evidence of malignancy. BI-RADS 2 BIRADS: BIRADS - CATEGORY 2 Benign Findings. Back to screening annual mammogram. OVERALL ASSESSMENT - BENIGN       Impression:  Invasive lobular carcinoma of left breast, grade 2, ER positive strongly, NV positive moderate, HER-2 not amplified, clinical stage T1c, N0, M0. Pathological stage T1 cN1 aM0  Recurrence score of 15  Left breast LCIS  Axillary lymph node cortical thickening  Premenopausal status    Plan: We reviewed her breast MRI which was negative. She remains in remission. We discussed her hormonal testing which shows menopausal status. She has discontinued Tamoxifen as directed and we will plan for  Femara, side effect profile was reviewed. I am putting in orders for Femara and baseline DEXA. Return in 3 months. NCCN guidelines were reviewed and discussed with the patient. The diagnosis and care plan were discussed with the patient in detail. I discussed the natural history of the disease, prognosis, risks and goals of therapy and answered all the patients questions to the best of my ability. Patient expressed understanding and was in agreement. Scribe Attestation   This note was created by Clover Rasmussen acting as scribe for the physician signing this note  Electronically Signed  Clover Rasmussen, 10/19/2022  [employer] Attending Attestation   Note was reviewed and edited.   I am in agreement with the note as entered    Sharyn Mckeon MD              This note is created with the assistance of a speech recognition program.  While intending to generate a document that actually reflects the content of the visit, the document can still have some errors including those of syntax and sound a like substitutions which may escape proof reading. It such instances, actual meaning can be extrapolated by contextual diversion.

## 2022-10-21 NOTE — PROGRESS NOTES
[facility]            Radiation Oncology          [address] [facility], [address]        Tiny Damon: [phone]        F: [phone]       [place] [address] [place] [address]       Radiation Oncology   [address] [facility], [address] [facility]       Tiny Nelson: [phone]       F: [phone]       [email or web address]      Date of Service: 10/19/2022     Location:  [address] [place] [address],   [address] [facility], [facility], [facility]   [phone]        RADIATION ONCOLOGY FOLLOW UP NOTE    Patient ID:   Jamarcus Osorio  : 1970   MRN: [id number]    DIAGNOSIS:  Cancer Staging  Malignant neoplasm of upper-outer quadrant of left breast in female, estrogen receptor positive ([facility] [address]  Staging form: Breast, AJCC 8th Edition  - Clinical stage from 6/3/2021: Stage IA (cT1c, cN0, cM0, G2, ER+, SD+, HER2-) - Signed by Monserrat Andres MD on 2021  - Pathologic stage from 2021: Stage IA (pT1c, pN1a(sn), cM0, G2, ER+, SD+, HER2-) - Signed by Monserrat Andres MD on 2021    -s/p Lump SLN 21  -Oncotype 15  -s/p RT 42.56 Gy 21  -d/c Tamoxifen transitioning to Femara    INTERVAL HISTORY:   Jamarcus Osorio is a 45 [facility] with a history of a left-sided breast cancer treated with lumpectomy followed by adjuvant course of radiation therapy completing 1 year ago. Patient comes in today for routine follow-up visit exam.  Overall she is doing well with no acute complaints. She notes no suspicious lumps bumps or nipple discharge in her breast.   She denies any headaches, dizziness, chest pain, shortness of breath, abdominal pain, nausea, diarrhea, bleeding, swelling, bleeding, weight loss, or fatigue. She was on tamoxifen however recently her blood work showed that she is postmenopausal and therefore will be transitioning to Femara.   She had a mammogram on 2022 which was a BI-RADS 3 with recommended follow-up of the left breast in 6 months therefore we did a breast MRI on 2022 which showed no evidence of malignancy. MEDICATIONS:    Current Outpatient Medications:     Calcium Carbonate-Vitamin D (OYSTER SHELL CALCIUM/D) 500-200 MG-UNIT TABS, Take 1 tablet by mouth daily, Disp: 180 tablet, Rfl: 1    letrozole (FEMARA) 2.5 MG tablet, Take 1 tablet by mouth daily, Disp: 90 tablet, Rfl: 1    tamoxifen (NOLVADEX) 20 MG tablet, TAKE 1 TABLET BY MOUTH DAILY (Patient not taking: No sig reported), Disp: 90 tablet, Rfl: 1    vitamin E 1000 units capsule, Take 1,000 Units by mouth daily, Disp: , Rfl:     magnesium 30 MG tablet, Take 30 mg by mouth 2 times daily, Disp: , Rfl:     Efinaconazole (JUBLIA) 10 % SOLN, Apply 1 Applicatorful topically daily, Disp: 8 mL, Rfl: 3    Multiple Vitamins-Minerals (THERAPEUTIC MULTIVITAMIN-MINERALS) tablet, Take 1 tablet by mouth daily, Disp: , Rfl:     ALLERGIES:  Allergies   Allergen Reactions    Wound Dressing Adhesive Rash     Steri strips          REVIEW OF SYSTEMS:    A full 14 point review of systems was performed and assessed and found to be negative except as noted above. PHYSICAL EXAMINATION:    CHAPERONE: Not Required    ECO Asymptomatic    VITAL SIGNS: /71   Pulse 55   Temp 98.1 °F (36.7 °C) (Temporal)   Resp 16   Wt 148 lb 6.4 oz (67.3 kg)   LMP 2021 (Within Weeks)   BMI 21.91 kg/m²   GENERAL:  General appearance is that of a well-nourished, well-developed in no apparent distress. HEENT: Normocephalic, atraumatic, EOMI, moist mucosa, no erythema. NECK:  No adenopathy or a palpable thyroid mass, trachea is midline. LYMPHATICS: No cervical, supraclavicular adenopathy. HEART:  Normal rate and regular rhythm, normal heart sounds. LUNGS:  Pulmonary effort normal. Normal breath sounds. ABDOMEN:  Soft, nontender, non distended. EXTREMITIES:  No edema. No calf tenderness. MSK:  No spinal tenderness. Normal ROM.   NEUROLOGICAL: Alert and oriented. Strength and sensation intact bilaterally. No focal deficits. PSYCH: Mood normal, behavior normal.  SKIN: No erythema, no desquamation. BREAST: Deferred       LABS:  WBC   Date Value Ref Range Status   05/11/2022 7.7 3.5 - 11.0 k/uL Final     Segs Absolute   Date Value Ref Range Status   05/11/2022 5.40 1.8 - 7.7 k/uL Final     Hemoglobin   Date Value Ref Range Status   05/11/2022 13.8 12.0 - 16.0 g/dL Final     Platelets   Date Value Ref Range Status   05/11/2022 268 140 - 450 k/uL Final     No results found for: , CEA  No results found for: PSA    IMAGING:   3/28/22 B/L Mammogram  Impression   Post treatment changes left breast within expected below limits. No evidence   of malignancy either breast.  Advise six-month mammographic follow-up of the   left breast as part of a post lumpectomy monitoring protocol. BREAST DENSITY SUMMARY C: The breasts are heterogeneously dense which may   obscure small masses. BI-RADS 3     10/12/22 MRI Breast  Impression   No MRI evidence of malignancy. BI-RADS 2             ASSESSMENT AND PLAN:  Raphael Almonte is a 45 y.o. female with a Cancer Staging  Malignant neoplasm of upper-outer quadrant of left breast in female, estrogen receptor positive ([facility] [address]  Staging form: Breast, AJCC 8th Edition  - Clinical stage from 6/3/2021: Stage IA (cT1c, cN0, cM0, G2, ER+, WI+, HER2-) - Signed by Tri De Paz MD on 6/21/2021  - Pathologic stage from 7/20/2021: Stage IA (pT1c, pN1a(sn), cM0, G2, ER+, WI+, HER2-) - Signed by Tri De Paz MD on 8/19/2021  . Patient comes in today for follow-up visit 1 year after completion of adjuvant radiation therapy. She is doing well with no significant toxicities remaining from her radiation treatments. Clinically patient is doing well with no signs of recurrence, and had a breast MRI and that was also negative.   She is recommended to resume her annual screening mammograms and therefore we will order a bilateral mammogram in 6 months. Mammogram which showed some posttreatment changes in the left breast though follow-up imaging was recommended in 6 months as she does have postlumpectomy and treatment changes as well as heterogeneously dense tissue. Patient will transition to Femara for endocrine therapy and continue following with medical oncology. She is also scheduled for DEXA bone density scan. Patient is recommended to come back in 6 months for another follow up visit and exam, or can call to come see us sooner if symptoms change. Patient was in agreement with my recommendations. All questions were answered to their satisfaction. Patient was advised to contact us anytime should they have any questions or concerns. Electronically signed by Ajay Hernandez MD on 10/21/2022 at 2:40 PM        Medications Prescribed:   New Prescriptions    CALCIUM CARBONATE-VITAMIN D (OYSTER SHELL CALCIUM/D) 500-200 MG-UNIT TABS    Take 1 tablet by mouth daily    LETROZOLE (FEMARA) 2.5 MG TABLET    Take 1 tablet by mouth daily       Orders:   Orders Placed This Encounter   Procedures    BELINDA COOKIE DIGITAL DIAGNOSTIC BILATERAL       CC:  Patient Care Team:  Price Santacruz MD as PCP - General (Family Medicine)  Price Santacruz MD as PCP - [facility] Provider     Total time spent on this case on the day of encounter is 30 minutes. This time includes combination of one or more of the following - review of necessary tests, review of pertinent medical records from the EMR, performing medically appropriate examination and evaluation, counseling and educating the patient/family/caregiver, ordering necessary medical tests, procedures etc., documenting the clinical information in the electronic medical record, care coordination, referring and communicating with other health care providers and interpretation of results independently.  This note is created with the assistance of a speech recognition program.  While intending to generate a document that actually reflects the content of the visit, the document can still have some errors including those of syntax and sound a like substitutions which may escape proof reading. It such instances, actual meaning can be extrapolated by contextual diversion.

## 2022-11-21 ENCOUNTER — HOSPITAL ENCOUNTER (OUTPATIENT)
Dept: WOMENS IMAGING | Age: 52
Discharge: HOME | End: 2022-11-23
Payer: COMMERCIAL

## 2022-11-21 DIAGNOSIS — Z79.811 AROMATASE INHIBITOR USE: ICD-10-CM

## 2022-11-21 DIAGNOSIS — C50.412 MALIGNANT NEOPLASM OF UPPER-OUTER QUADRANT OF LEFT BREAST IN FEMALE, ESTROGEN RECEPTOR POSITIVE (HCC): ICD-10-CM

## 2022-11-21 DIAGNOSIS — Z17.0 MALIGNANT NEOPLASM OF UPPER-OUTER QUADRANT OF LEFT BREAST IN FEMALE, ESTROGEN RECEPTOR POSITIVE (HCC): ICD-10-CM

## 2022-11-21 PROCEDURE — 77080 DXA BONE DENSITY AXIAL: CPT

## 2023-01-03 ENCOUNTER — TELEPHONE (OUTPATIENT)
Dept: INFUSION THERAPY | Age: 53
End: 2023-01-03

## 2023-01-03 NOTE — TELEPHONE ENCOUNTER
Pt called in stating she has been experiencing s/e from femara. She states she is very fatigued and irritable. She denies any joint or muscle pain. She also states she was getting severely constipated from the calcium supplement she was advised to take with femara, so she stopped it and the constipation resolved. She would like to speak to Dr. Hakan Grady about these s/e and possibly discuss switching to another medication. Writer tx pt to scheduling to make sooner f/u with Dr. Hakan Grady.

## 2023-01-11 ENCOUNTER — INITIAL CONSULT (OUTPATIENT)
Dept: ONCOLOGY | Age: 53
End: 2023-01-11
Payer: COMMERCIAL

## 2023-01-11 ENCOUNTER — TELEPHONE (OUTPATIENT)
Dept: ONCOLOGY | Age: 53
End: 2023-01-11

## 2023-01-11 VITALS
TEMPERATURE: 97.3 F | WEIGHT: 153.1 LBS | HEART RATE: 62 BPM | BODY MASS INDEX: 22.61 KG/M2 | DIASTOLIC BLOOD PRESSURE: 76 MMHG | SYSTOLIC BLOOD PRESSURE: 114 MMHG

## 2023-01-11 DIAGNOSIS — M85.80 OSTEOPENIA, UNSPECIFIED LOCATION: ICD-10-CM

## 2023-01-11 DIAGNOSIS — Z79.811 AROMATASE INHIBITOR USE: ICD-10-CM

## 2023-01-11 DIAGNOSIS — Z80.3 FAMILY HISTORY OF BREAST CANCER: ICD-10-CM

## 2023-01-11 DIAGNOSIS — Z17.0 MALIGNANT NEOPLASM OF UPPER-OUTER QUADRANT OF LEFT BREAST IN FEMALE, ESTROGEN RECEPTOR POSITIVE (HCC): Primary | ICD-10-CM

## 2023-01-11 DIAGNOSIS — C50.412 MALIGNANT NEOPLASM OF UPPER-OUTER QUADRANT OF LEFT BREAST IN FEMALE, ESTROGEN RECEPTOR POSITIVE (HCC): Primary | ICD-10-CM

## 2023-01-11 PROCEDURE — 99214 OFFICE O/P EST MOD 30 MIN: CPT | Performed by: INTERNAL MEDICINE

## 2023-01-11 PROCEDURE — 99211 OFF/OP EST MAY X REQ PHY/QHP: CPT | Performed by: INTERNAL MEDICINE

## 2023-01-11 RX ORDER — EXEMESTANE 25 MG/1
25 TABLET ORAL DAILY
Qty: 30 TABLET | Refills: 1 | Status: SHIPPED | OUTPATIENT
Start: 2023-01-11

## 2023-01-11 NOTE — TELEPHONE ENCOUNTER
AVS from 1/11/23      Rv in 3 months   mammogram in march     *[email or web address] by R/O  *rv is sched [email or web address] to pt work schedule      PT was given AVS and appt schedule

## 2023-01-11 NOTE — PROGRESS NOTES
Moreno Antonio                                                                                                                  1/11/2023  MRN:   [id number]  YOB: 1970  PCP:                           Randi Frances MD  Referring Physician: No ref. provider found  Treating Physician Name: Everardo Del Rosario MD      Reason for visit:  Chief Complaint   Patient presents with    Follow-up     Review status of disease    Results     Go over dexa scan     Other     Letrozole having bad hot flashes, mood swings  and fatigue did stop taking 2 weeks ago does feel better     Taking the calcium with vitamin D was making her constipated stopped taking it     Discussed treatment plan. Toxicity check. Current problems:  Invasive lobular carcinoma of left breast, grade 2, ER positive strongly, TX positive moderate, HER-2 not amplified, clinical stage T1c, N0, M0. Pathological stage T1 cN1 aM0  Oncotype recurrence score of 15  Left breast LCIS  Postmenopausal status confirmed-10/2022    Active and recent treatments:  Left breast lumpectomy with sentinel lymph node biopsy-7/2021  S/p adjuvant radiation therapy--, completed 9/2021  Tamoxifen--9/2021, discontinued 09/2022  Femara-11/20/2022 through 1/20/2023(discontinued due to side effects)  Anticipating Aromasin-2/2023    Summary of Case/History:    Moreno Antonio a 47 y. o.female is a patient recently diagnosed with left breast lobular invasive cancer presents to the clinic to establish care and for further work-up and evaluation. Patient last mammogram done about 2 years ago was unremarkable but her latest mammogram showed asymmetrical density in the upper outer quadrant at 1230-1 o'clock position. This was further confirmed with ultrasound which showed a 1.7 cm x 1.3 cm mass. Patient underwent biopsy which confirmed a lobular cancer, grade 2. Ultrasound also showed suspicious lymph node however biopsy was negative for malignancy.   Patient herself did not notice any palpable mass. Denies any breast pain nipple discharge denies any previous history of biopsy. Patient is premenopausal.    Patient age of menarche was 14 years. Age of first child was 31. Patient has 2 children. Patient is premenopausal.  She is not on birth control or hormonal medication. Does not smoke. 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 Patient has history of breast cancer in her mother at age of 58. Interim History:    She presents to the clinic accompanied by her  to discuss results of her lab work-up and further treatment plan. Patient started taking Femara but since she has been on Femara complains of having worsening hot flashes being very tired all the time and very irritable. During this visit patient's allergy, social, medical, surgical history and medications were reviewed and updated.     Past Medical History:   Denies history of hypertension diabetes mellitus    Past Surgical History:     Past Surgical History:   Procedure Laterality Date    BREAST BIOPSY Left 7/20/2021    LEFT BREAST LUMPECTOMY    SENTINEL LYMPH NODE BIOPSY  (@ 07:30 )  WITH FROZEN SECTION performed by Calli Pham DO at [address] [place] [address] Left 7/20/2021    LEFT BREAST ONCOPLASTIC RECONSTRUCTION   PECT BLOCK performed by Asia Herrera MD at [place] [id number] USING DEVICE LEFT Left 7/1/2021    MRI BREAST BX USING DEVICE LEFT 7/1/2021 [facility]    US BREAST NEEDLE BIOPSY LEFT Left 6/3/2021    US BREAST NEEDLE BIOPSY LEFT 6/3/2021 [id number] LYMPH NODE BIOPSY  6/3/2021    US LYMPH NODE BIOPSY 6/3/2021 [address] [facility]    WISDOM TOOTH EXTRACTION         Patient Family Social History:    Family History   Problem Relation Age of Onset    Other Father         Gout    Breast Cancer Mother 59        ductal carcinoma lumpectomy w/ radiation    Migraines Mother     No Known Problems Brother     Breast Cancer Paternal Grandmother [de-identified] Heart Attack Maternal Grandfather 76     Social History     Socioeconomic History    Marital status:      Spouse name: None    Number of children: None    Years of education: None    Highest education level: None   Tobacco Use    Smoking status: Never    Smokeless tobacco: Never   Substance and Sexual Activity    Alcohol use: No    Drug use: No    Sexual activity: Yes     Partners: Male     Current Medications:  Patient does not currently take any prescription medication    Allergies:   Wound dressing adhesive    Review of Systems:    Constitutional: No fever or chills. No night sweats, no weight loss. Positive hot flashes, worse  Eyes: No eye discharge, double vision, or eye pain   HEENT: negative for sore mouth, sore throat, hoarseness and voice change   Respiratory: negative for cough , sputum, dyspnea, wheezing, hemoptysis, chest pain   Cardiovascular: negative for chest pain, dyspnea, palpitations, orthopnea, PND   Gastrointestinal: negative for nausea, vomiting, diarrhea, constipation, abdominal pain, Dysphagia, hematemesis and hematochezia   Genitourinary: negative for frequency, dysuria, nocturia, urinary incontinence, and hematuria   Integument: negative for rash, skin lesions, bruises.    Hematologic/Lymphatic: negative for easy bruising, bleeding, lymphadenopathy, or petechiae   Endocrine: negative for heat or cold intolerance,weight changes, change in bowel habits and hair loss   Musculoskeletal: negative for myalgias, arthralgias, pain, joint swelling,and bone pain   Neurological: negative for headaches, dizziness, seizures, weakness, numbness        Physical Exam:    Vitals: /76   Pulse 62   Temp 97.3 °F (36.3 °C) (Temporal)   Wt 153 lb 1.6 oz (69.4 kg)   LMP 08/16/2021 (Within Weeks)   BMI 22.61 kg/m²   General appearance - well appearing, no in pain or distress  Mental status - AAO X3  Eyes - pupils equal and reactive, extraocular eye movements intact  Mouth - mucous membranes moist, pharynx normal without lesions  Neck - supple, no significant adenopathy  Lymphatics - no palpable lymphadenopathy, no hepatosplenomegaly  Chest - clear to auscultation, no wheezes, rales or rhonchi, symmetric air entry  Heart - normal rate, regular rhythm, normal S1, S2, no murmurs  Abdomen - soft, nontender, nondistended, no masses or organomegaly  Neurological - alert, oriented, normal speech, no focal findings or movement disorder noted  Extremities - peripheral pulses normal, no pedal edema, no clubbing or cyanosis  Skin - normal coloration and turgor, no rashes, no suspicious skin lesions noted       DATA:  Lab Results   Component Value Date    WBC 7.7 05/11/2022    HGB 13.8 05/11/2022    HCT 39.6 05/11/2022    MCV 92.8 05/11/2022     05/11/2022       Chemistry        Component Value Date/Time     05/11/2022 1300    K 3.9 05/11/2022 1300     05/11/2022 1300    CO2 27 05/11/2022 1300    BUN 15 05/11/2022 1300    CREATININE 0.83 05/11/2022 1300        Component Value Date/Time    CALCIUM 9.7 05/11/2022 1300    ALKPHOS 146 (H) 05/11/2022 1300    AST 30 05/11/2022 1300    ALT 14 05/11/2022 1300    BILITOT 0.31 05/11/2022 1300        MRI BREAST BILATERAL W WO CONTRAST    Result Date: 10/13/2022  EXAMINATION: MRI OF THE BILATERAL BREASTS WITHOUT AND WITH CONTRAST 10/12/2022 2:28 pm: TECHNIQUE: Multiplanar multisequence MRI of the bilateral breasts were performed without and with the administration of intravenous contrast. Data analysis was performed with color parametric mapping, image subtraction, and 3D reconstructions. COMPARISON: Prior comparison MRI study dated 06/18/2021. Prior mammogram dated 03/28/2022.  HISTORY: ORDERING SYSTEM PROVIDED HISTORY: Malignant neoplasm of upper-outer quadrant of left breast in female, estrogen receptor positive ([facility] [address] TECHNOLOGIST PROVIDED HISTORY: STAT Creatinine as needed:->Yes Reason for Exam: Malignant neoplasm of upper-outer quadrant of left breast in female, estrogen receptor positive 70-year-old female with history of invasive lobular carcinoma of the left breast status post lumpectomy and radiation in 2021. Patient presents for surveillance MRI study. FINDINGS: Breast parenchyma: Heterogeneously dense. Minimal background parenchymal enhancement symmetrical. Right breast: No evidence of abnormal enhancing mass or non mass enhancement. Small enhancing foci in the retroareolar right breast without suspicious kinetics similar to prior study consistent with benign etiology. The nipple/areolar complex and chest wall unremarkable. No evidence of lymphadenopathy. Left breast: Expected post treatment changes in the superior and superolateral left breast including expected architectural distortion and surgical clips. No evidence of suspicious enhancing mass or non mass enhancement. The nipple/areolar complex and chest wall unremarkable. No evidence of lymphadenopathy. No MRI evidence of malignancy. BI-RADS 2 BIRADS: BIRADS - CATEGORY 2 Benign Findings. Back to screening annual mammogram. OVERALL ASSESSMENT - BENIGN       DEXA :     Impression   Osteopenia by WHO criteria. RECOMMENDATIONS:   1. All patients should optimize their calcium and vitamin D intake.        2. Consider FDA-approved medical therapies in postmenopausal women and men   aged 48 years and older, based on the following:       - A hip or vertebral (clinical or morphometric) fracture       - T-score less than or equal to -2.5 at the femoral neck or spine after   appropriate evaluation to exclude secondary causes       - Low bone density (T-score between -1.0 and -2.5 at the femoral neck or   spine) and a 10-year probability of a hip fracture greater than or equal to   3% or a 10-year probability of a major osteoporosis-related fracture greater   than or equal to 20% based on FRAX calculation.       - Clinician judgment and/or patient preferences may indicate treatment for   people with 10-year fracture probabilities above or below these levels       - Further guidance on treatment can be found at the [facility] Osteoporosis   Foundation's website bonesource.org.       3. Patients with diagnosis of osteoporosis or at high risk for fracture   should have regular bone mineral density tests. For patients eligible for   Medicare, routine testing is allowed once every 2 years. The testing   frequency can be increased to one year for patients who have rapidly   progressing disease, those who are receiving or discontinuing medical therapy   to restore bone mass or have additional risk factors. Template code:  [id number]       Impression:  Invasive lobular carcinoma of left breast, grade 2, ER positive strongly, NM positive moderate, HER-2 not amplified, clinical stage T1c, N0, M0. Pathological stage T1 cN1 aM0  Recurrence score of 15  Left breast LCIS  Axillary lymph node cortical thickening  Premenopausal status at diagnosis  Osteopenia    Plan:  I had a detailed discussion with the patient and personally went over results of her lab work-up and other relevant clinical data  Patient is complaining of a lot of side effects on Femara. After detailed discussion we decided to discontinue Femara. I will call in Aromasin I advised the patient to start taking the medicine about a month. Continue annual screening mammogram  Continue DEXA scan every 2 years  Patient is also struggling with calcium vitamin D supplement. I advised the patient to continue vitamin D and take Tums with vitamin D  Discussed role of adjuvant hormone therapy including chemoprevention as well as improving overall survival in regards to invasive breast cancer  NCCN guidelines were reviewed and discussed with the patient. The diagnosis and care plan were discussed with the patient in detail.  I discussed the natural history of the disease, prognosis, risks and goals of therapy and answered all the patients questions to the best of my ability. Patient expressed understanding and was in agreement. Joaquim Wood MD              This note is created with the assistance of a speech recognition program.  While intending to generate a document that actually reflects the content of the visit, the document can still have some errors including those of syntax and sound a like substitutions which may escape proof reading. It such instances, actual meaning can be extrapolated by contextual diversion.

## 2023-01-31 ENCOUNTER — TELEPHONE (OUTPATIENT)
Dept: INFUSION THERAPY | Age: 53
End: 2023-01-31

## 2023-01-31 NOTE — TELEPHONE ENCOUNTER
Pt called stating she has been on the aromasin for about 2 weeks, and she started having a period two days ago. She states she has been in menopause, and has not had any bleeding in over a year. She states the bleeding started light, but has gotten heavier. She states she is needing to use a tampon, and is going through a few a day. She denies any other issues, and states she feels well on the aromasin. Will notify Dr. Ady Hammer and will call pt back with any recommendations.

## 2023-02-02 NOTE — TELEPHONE ENCOUNTER
Per Jerald pt to stop Aromasin tablets until seen by her Gynecologist.  Pt verbalized understanding, states will call Dr Jose Cancino today for appt. Pt notified to call our office after she has been seen with an update.

## 2023-03-09 ENCOUNTER — HOSPITAL ENCOUNTER (OUTPATIENT)
Age: 53
Setting detail: SPECIMEN
Discharge: HOME | End: 2023-03-09
Payer: COMMERCIAL

## 2023-03-09 ENCOUNTER — OFFICE VISIT (OUTPATIENT)
Dept: OBGYN CLINIC | Age: 53
End: 2023-03-09
Payer: COMMERCIAL

## 2023-03-09 VITALS
DIASTOLIC BLOOD PRESSURE: 84 MMHG | HEIGHT: 69 IN | SYSTOLIC BLOOD PRESSURE: 112 MMHG | BODY MASS INDEX: 22.51 KG/M2 | WEIGHT: 152 LBS

## 2023-03-09 DIAGNOSIS — N95.0 PMB (POSTMENOPAUSAL BLEEDING): ICD-10-CM

## 2023-03-09 DIAGNOSIS — N95.0 PMB (POSTMENOPAUSAL BLEEDING): Primary | ICD-10-CM

## 2023-03-09 LAB
ESTRADIOL LEVEL: 58.6 PG/ML (ref 27–314)
FOLLICLE STIMULATING HORMONE: 17.4 MIU/ML (ref 1.7–21.5)
TSH SERPL-ACNC: 3.26 UIU/ML (ref 0.3–5)

## 2023-03-09 PROCEDURE — 82670 ASSAY OF TOTAL ESTRADIOL: CPT

## 2023-03-09 PROCEDURE — 36415 COLL VENOUS BLD VENIPUNCTURE: CPT

## 2023-03-09 PROCEDURE — 84443 ASSAY THYROID STIM HORMONE: CPT

## 2023-03-09 PROCEDURE — 99213 OFFICE O/P EST LOW 20 MIN: CPT | Performed by: OBSTETRICS & GYNECOLOGY

## 2023-03-09 PROCEDURE — 83001 ASSAY OF GONADOTROPIN (FSH): CPT

## 2023-03-09 NOTE — PROGRESS NOTES
Randi Bryan  3/9/2023    YOB: 1970          The patient was seen today. She is here regarding irregular bleeding. Pt states she was checked for menopausal status and was found to be menopausal ~1 year ago. Pt was on tamoxifen x 1 year after her diagnosis of breast cancer. Pt was then changes to a different medication since menopausal but was having side effects. She was then changed to another medication which she was told to stop after she had vaginal bleeding. Pt has not had a pelvic sono. Pt wishes to have her hormones rechecked  . Her bowels are regular and she is voiding without difficulty.      HPI:  Randi Bryan is a 47 y.o. female        OB History    Para Term  AB Living   2 2 2 0 0 2   SAB IAB Ectopic Molar Multiple Live Births   0 0 0 0 0 1      # Outcome Date GA Lbr Rajendra/2nd Weight Sex Delivery Anes PTL Lv   2 Term    7 lb 13 oz (3.544 kg) F Vag-Spont      1 Term    7 lb 3 oz (3.26 kg) F Vag-Spont   KEYANA       Past Medical History:   Diagnosis Date    Breast cancer [facility])        Past Surgical History:   Procedure Laterality Date    BREAST BIOPSY Left 2021    LEFT BREAST LUMPECTOMY    SENTINEL LYMPH NODE BIOPSY  (@ 07:30 )  WITH FROZEN SECTION performed by Michael Leija DO at [address] [place] [address] Left 2021    LEFT BREAST ONCOPLASTIC RECONSTRUCTION   PECT BLOCK performed by Mikaela Bansal MD at [facility] USING DEVICE LEFT Left 2021    MRI BREAST BX USING DEVICE LEFT 2021 [facility]    US BREAST BIOPSY W LOC DEVICE 1ST LESION LEFT Left 6/3/2021    US BREAST NEEDLE BIOPSY LEFT 6/3/2021 [facility] LYMPH NODE BIOPSY  6/3/2021    US LYMPH NODE BIOPSY 6/3/2021 [facility] [address]    WISDOM TOOTH EXTRACTION         Family History   Problem Relation Age of Onset    Other Father         Gout    Breast Cancer Mother 59        ductal carcinoma lumpectomy w/ radiation    Migraines Mother     No Known Problems Brother     Breast Cancer Paternal Grandmother [de-identified]    Heart Attack Maternal Grandfather 76       Social History     Socioeconomic History    Marital status:      Spouse name: Not on file    Number of children: Not on file    Years of education: Not on file    Highest education level: Not on file   Occupational History    Not on file   Tobacco Use    Smoking status: Never    Smokeless tobacco: Never   Substance and Sexual Activity    Alcohol use: No    Drug use: No    Sexual activity: Yes     Partners: Male   Other Topics Concern    Not on file   Social History Narrative    Not on file     Social Determinants of Health     Financial Resource Strain: Not on file   Food Insecurity: Not on file   Transportation Needs: Not on file   Physical Activity: Not on file   Stress: Not on file   Social Connections: Not on file   Intimate Partner Violence: Not on file   Housing Stability: Not on file         MEDICATIONS:  Current Outpatient Medications   Medication Sig Dispense Refill    exemestane (AROMASIN) 25 MG tablet Take 1 tablet by mouth daily (Patient not taking: Reported on 3/9/2023) 30 tablet 1    Calcium Carbonate-Vitamin D (OYSTER SHELL CALCIUM/D) 500-200 MG-UNIT TABS Take 1 tablet by mouth daily (Patient not taking: Reported on 1/11/2023) 180 tablet 1    letrozole (FEMARA) 2.5 MG tablet Take 1 tablet by mouth daily (Patient not taking: Reported on 1/11/2023) 90 tablet 1    tamoxifen (NOLVADEX) 20 MG tablet TAKE 1 TABLET BY MOUTH DAILY (Patient not taking: No sig reported) 90 tablet 1    magnesium 30 MG tablet Take 30 mg by mouth 2 times daily (Patient not taking: No sig reported)      Efinaconazole (JUBLIA) 10 % SOLN Apply 1 Applicatorful topically daily (Patient not taking: Reported on 3/9/2023) 8 mL 3    Multiple Vitamins-Minerals (THERAPEUTIC MULTIVITAMIN-MINERALS) tablet Take 1 tablet by mouth daily (Patient not taking: Reported on 3/9/2023)       No current facility-administered medications for this visit. ALLERGIES:  Allergies as of 03/09/2023 - Fully Reviewed 03/09/2023   Allergen Reaction Noted    Wound dressing adhesive Rash 08/18/2021         REVIEW OF SYSTEMS:       A minimum of an eleven point review of systems was completed. Review Of Systems (11 point):  Constitutional: No fever, chills or malaise; No weight change or fatigue  Head and Eyes: No vision, Headache, Dizziness or trauma in last 12 months  ENT ROS: No hearing, Tinnitis, sinus or taste problems  Hematological and Lymphatic ROS:No Lymphoma, Von Willebrand's, Hemophillia or Bleeding History  Psych ROS: No Depression, Homicidal thoughts,suicidal thoughts, or anxiety  Breast ROS: No prior breast abnormalities or lumps  Respiratory ROS: No SOB, Pneumoniae,Cough, or Pulmonary Embolism History  Cardiovascular ROS: No Chest Pain with Exertion, Palpitations, Syncope, Edema, Arrhythmia  Gastrointestinal ROS: No Indigestion, Heartburn, Nausea, vomiting, Diarrhea, Constipation,or Bowel Changes; No Bloody Stools or melena  Genito-Urinary ROS: No Dysuria, Hematuria or Nocturia. No Urinary Incontinence or Vaginal Discharge  Musculoskeletal ROS: No Arthralgia, Arthritis,Gout,Osteoporosis or Rheumatism  Neurological ROS: No CVA, Migraines, Epilepsy, Seizure Hx, or Limb Weakness  Dermatological ROS: No Rash, Itching, Hives, Mole Changes or Cancer          Blood pressure 112/84, height 5' 9\" (1.753 m), weight 152 lb (68.9 kg), last menstrual period 02/25/2023, not currently breastfeeding. Abdomen: Soft non-tender; good bowel sounds. No guarding, rebound or rigidity. No CVA tenderness bilaterally. Extremities: No calf tenderness, DTR 2/4, and No edema bilaterally    Pelvic: Exam deferred. Diagnostics:  No results found.     Lab Results:  Results for orders placed or performed during the hospital encounter of 02/11/[birth year]   Follicle Stimulating Hormone   Result Value Ref Range    FSH 61.2 (H) 1.7 - 21.5 mIU/mL   Estradiol   Result Value Ref Range    Estradiol <5.0 (L) 27 - 314 pg/mL         Assessment:   Diagnosis Orders   1. PMB (postmenopausal bleeding)  Follicle Stimulating Hormone    Estradiol    TSH with Reflex    US NON OB TRANSVAGINAL    US PELVIS COMPLETE        Patient Active Problem List    Diagnosis Date Noted    Malignant neoplasm of upper-outer quadrant of left breast in female, estrogen receptor positive ([facility] [address] 06/16/2021    Body mass index (BMI) 20.0-20.9, adult 08/28/2017           PLAN:  Return in about 4 weeks (around 4/6/2023) for Follow up labs/diagnostics. Pelvic sono. If thickened lining will need surgical board for D&C/ Hysteroscopy/ Myosure resection polyps  Repeat Annual every 1 year  Cervical Cytology Evaluation begins at 23years old. If Negative Cytology, Follow-up screening per current guidelines. Return to the office in 3-4 weeks. Counseled on preventative health maintenance follow-up. Orders Placed This Encounter   Procedures    US NON OB TRANSVAGINAL     Standing Status:   Future     Standing Expiration Date:   3/9/2024    US PELVIS COMPLETE     Standing Status:   Future     Standing Expiration Date:   8/4/2337    Follicle Stimulating Hormone     Standing Status:   Future     Number of Occurrences:   1     Standing Expiration Date:   3/9/2024    Estradiol     Standing Status:   Future     Number of Occurrences:   1     Standing Expiration Date:   3/8/2024    TSH with Reflex     Standing Status:   Future     Number of Occurrences:   1     Standing Expiration Date:   3/9/2024           The patient, Jamal Gaming is a 47 y.o. female, was seen with a total time spent of 20 minutes for the visit on this date of service by the E/M provider. The time component had both face to face and non face to face time spent in determining the total time component. Counseling and education regarding her diagnosis listed below and her options regarding those diagnoses were also included in determining her time component. Diagnosis Orders   1. PMB (postmenopausal bleeding)  Follicle Stimulating Hormone    Estradiol    TSH with Reflex    US NON OB TRANSVAGINAL    US PELVIS COMPLETE           The patient had her preventative health maintenance recommendations and follow-up reviewed with her at the completion of her visit.

## 2023-03-22 ENCOUNTER — PROCEDURE VISIT (OUTPATIENT)
Dept: OBGYN CLINIC | Age: 53
End: 2023-03-22

## 2023-03-22 ENCOUNTER — TELEPHONE (OUTPATIENT)
Dept: OBGYN CLINIC | Age: 53
End: 2023-03-22

## 2023-03-22 DIAGNOSIS — N95.0 PMB (POSTMENOPAUSAL BLEEDING): ICD-10-CM

## 2023-03-22 NOTE — TELEPHONE ENCOUNTER
Pt instructed to schedule follow up with Dr Adina Boo after pelvic US to discuss results and recommendations. Patient was previously told she was postmenopausal.  Sharita Shaffer recently saw her and had labs drawn.    Having irregular bleeding

## 2023-03-22 NOTE — TELEPHONE ENCOUNTER
----- Message from RIKKI Serna CNP sent at 3/21/2023  5:43 PM EDT -----  Patient was previously told she was postmenopausal.  Christine Mccarthy recently saw her and had labs drawn. Please review with DR Angelita Forrest and bring patient back in for follow up.   Having irregular bleeding

## 2023-04-03 ENCOUNTER — TELEPHONE (OUTPATIENT)
Dept: RADIATION ONCOLOGY | Age: 53
End: 2023-04-03

## 2023-04-03 NOTE — TELEPHONE ENCOUNTER
Patient called to cancel follow up on 4/18/23. Patient has other health concerns at this time that she is following with including a bx. Reminded patient of her mammogram scheduled for 4/13. Adding to pending for results due to follow up cancelled. Patient appreciative of canceling follow up right now. Dr. Nahed Le follow up noted for 4/26 in chart.

## 2023-04-27 ENCOUNTER — PROCEDURE VISIT (OUTPATIENT)
Dept: OBGYN CLINIC | Age: 53
End: 2023-04-27

## 2023-04-27 ENCOUNTER — HOSPITAL ENCOUNTER (OUTPATIENT)
Age: 53
Setting detail: SPECIMEN
Discharge: HOME | End: 2023-04-27

## 2023-04-27 VITALS
WEIGHT: 151 LBS | BODY MASS INDEX: 22.88 KG/M2 | HEIGHT: 68 IN | DIASTOLIC BLOOD PRESSURE: 66 MMHG | SYSTOLIC BLOOD PRESSURE: 112 MMHG

## 2023-04-27 DIAGNOSIS — Z17.0 MALIGNANT NEOPLASM OF UPPER-OUTER QUADRANT OF LEFT BREAST IN FEMALE, ESTROGEN RECEPTOR POSITIVE (HCC): ICD-10-CM

## 2023-04-27 DIAGNOSIS — C50.412 MALIGNANT NEOPLASM OF UPPER-OUTER QUADRANT OF LEFT BREAST IN FEMALE, ESTROGEN RECEPTOR POSITIVE (HCC): ICD-10-CM

## 2023-04-27 DIAGNOSIS — R93.89 ENDOMETRIAL THICKENING ON ULTRASOUND: Primary | ICD-10-CM

## 2023-04-27 DIAGNOSIS — Z80.3 FAMILY HISTORY OF BREAST CANCER IN FIRST DEGREE RELATIVE: ICD-10-CM

## 2023-04-27 DIAGNOSIS — N92.4 PERIMENOPAUSAL MENORRHAGIA: ICD-10-CM

## 2023-04-27 DIAGNOSIS — Z32.02 NEGATIVE PREGNANCY TEST: ICD-10-CM

## 2023-04-27 LAB
CONTROL: NORMAL
PREGNANCY TEST URINE, POC: NEGATIVE

## 2023-04-27 NOTE — PROGRESS NOTES
ENDOMETRIUM&/POLYPC W/WO D&C    Specimen to Pathology Outpatient    POCT urine pregnancy      2. Negative pregnancy test        3. Perimenopausal menorrhagia  WA HYSTEROSCOPY BX ENDOMETRIUM&/POLYPC W/WO D&C    Specimen to Pathology Outpatient    POCT urine pregnancy      4. Family history of breast cancer in first degree relative        5. Malignant neoplasm of upper-outer quadrant of left breast in female, estrogen receptor positive [facility])          Patient Active Problem List    Diagnosis Date Noted    Malignant neoplasm of upper-outer quadrant of left breast in female, estrogen receptor positive ([facility] [address] 06/16/2021    Body mass index (BMI) 20.0-20.9, adult 08/28/2017           Recommendations:  Return to the office for follow-up in 2 weeks to review the pathology of the biopsy and for further recommendations. Patient is considering close monitoring. Pt has a follow up with oncology 1 week. Labs c/w perimenopause. Bleeding c/w perimenopausal changes. Pt had genetic testing with no increase risk ovarian cancer per pt.       Janey Godoy DO

## 2023-04-28 ENCOUNTER — TELEPHONE (OUTPATIENT)
Dept: OBGYN CLINIC | Age: 53
End: 2023-04-28

## 2023-04-28 LAB — SURGICAL PATHOLOGY REPORT: NORMAL

## 2023-04-28 NOTE — TELEPHONE ENCOUNTER
----- Message from RIKKI Tolliver CNP sent at 4/28/2023 10:39 AM EDT -----  Negative endometrial biopsy  Please let patient know

## 2023-04-28 NOTE — TELEPHONE ENCOUNTER
Per Carl Paul pt notified of Negative endometrial biopsy results. Pt voiced understanding to all results and recommendations.

## 2023-05-03 ENCOUNTER — TELEPHONE (OUTPATIENT)
Dept: ONCOLOGY | Age: 53
End: 2023-05-03

## 2023-05-03 ENCOUNTER — OFFICE VISIT (OUTPATIENT)
Dept: ONCOLOGY | Age: 53
End: 2023-05-03
Payer: COMMERCIAL

## 2023-05-03 VITALS
BODY MASS INDEX: 23.04 KG/M2 | TEMPERATURE: 97.8 F | HEART RATE: 70 BPM | WEIGHT: 151.5 LBS | SYSTOLIC BLOOD PRESSURE: 122 MMHG | RESPIRATION RATE: 16 BRPM | DIASTOLIC BLOOD PRESSURE: 77 MMHG | OXYGEN SATURATION: 100 %

## 2023-05-03 DIAGNOSIS — C50.412 MALIGNANT NEOPLASM OF UPPER-OUTER QUADRANT OF LEFT BREAST IN FEMALE, ESTROGEN RECEPTOR POSITIVE (HCC): Primary | ICD-10-CM

## 2023-05-03 DIAGNOSIS — Z17.0 MALIGNANT NEOPLASM OF UPPER-OUTER QUADRANT OF LEFT BREAST IN FEMALE, ESTROGEN RECEPTOR POSITIVE (HCC): Primary | ICD-10-CM

## 2023-05-03 DIAGNOSIS — D05.02 BREAST NEOPLASM, TIS (LCIS), LEFT: ICD-10-CM

## 2023-05-03 DIAGNOSIS — M85.80 OSTEOPENIA, UNSPECIFIED LOCATION: ICD-10-CM

## 2023-05-03 PROCEDURE — 99214 OFFICE O/P EST MOD 30 MIN: CPT | Performed by: INTERNAL MEDICINE

## 2023-05-03 RX ORDER — M-VIT,TX,IRON,MINS/CALC/FOLIC 27MG-0.4MG
1 TABLET ORAL DAILY
COMMUNITY

## 2023-05-03 NOTE — TELEPHONE ENCOUNTER
AVS from 5/3/23    Rv in 3 month s    Rv scheduled for 8/9 @ 3:00 pm     Pt was given AVS and appointment schedule    Electronically signed by Guanakito Valle on 5/3/2023 at 3:22 PM

## 2023-08-09 ENCOUNTER — OFFICE VISIT (OUTPATIENT)
Dept: ONCOLOGY | Age: 53
End: 2023-08-09
Payer: COMMERCIAL

## 2023-08-09 ENCOUNTER — TELEPHONE (OUTPATIENT)
Dept: ONCOLOGY | Age: 53
End: 2023-08-09

## 2023-08-09 VITALS
TEMPERATURE: 97.6 F | DIASTOLIC BLOOD PRESSURE: 74 MMHG | HEART RATE: 60 BPM | BODY MASS INDEX: 22.94 KG/M2 | WEIGHT: 150.9 LBS | SYSTOLIC BLOOD PRESSURE: 113 MMHG

## 2023-08-09 DIAGNOSIS — Z17.0 MALIGNANT NEOPLASM OF UPPER-OUTER QUADRANT OF LEFT BREAST IN FEMALE, ESTROGEN RECEPTOR POSITIVE (HCC): Primary | ICD-10-CM

## 2023-08-09 DIAGNOSIS — M85.80 OSTEOPENIA, UNSPECIFIED LOCATION: ICD-10-CM

## 2023-08-09 DIAGNOSIS — C50.412 MALIGNANT NEOPLASM OF UPPER-OUTER QUADRANT OF LEFT BREAST IN FEMALE, ESTROGEN RECEPTOR POSITIVE (HCC): Primary | ICD-10-CM

## 2023-08-09 DIAGNOSIS — D05.02 BREAST NEOPLASM, TIS (LCIS), LEFT: ICD-10-CM

## 2023-08-09 PROCEDURE — 99214 OFFICE O/P EST MOD 30 MIN: CPT | Performed by: INTERNAL MEDICINE

## 2023-08-09 PROCEDURE — 99211 OFF/OP EST MAY X REQ PHY/QHP: CPT | Performed by: INTERNAL MEDICINE

## 2023-08-09 RX ORDER — TAMOXIFEN CITRATE 10 MG/1
10 TABLET ORAL DAILY
COMMUNITY

## 2023-08-09 NOTE — TELEPHONE ENCOUNTER
AVS from 8/9/23      Rv in 6 months     Rv sched for 2/7 @ 3:00 pm     Pt elected to access avs & schedule on [employer]     Electronically signed by Cindy Billingsley on 8/9/2023 at 3:47 PM

## 2023-08-09 NOTE — PROGRESS NOTES
some errors including those of syntax and sound a like substitutions which may escape proof reading. It such instances, actual meaning can be extrapolated by contextual diversion.

## 2023-11-13 DIAGNOSIS — D05.02 BREAST NEOPLASM, TIS (LCIS), LEFT: ICD-10-CM

## 2023-11-13 DIAGNOSIS — Z80.3 FAMILY HISTORY OF BREAST CANCER: ICD-10-CM

## 2023-11-13 DIAGNOSIS — Z17.0 MALIGNANT NEOPLASM OF UPPER-OUTER QUADRANT OF LEFT BREAST IN FEMALE, ESTROGEN RECEPTOR POSITIVE (HCC): ICD-10-CM

## 2023-11-13 DIAGNOSIS — C50.412 MALIGNANT NEOPLASM OF UPPER-OUTER QUADRANT OF LEFT BREAST IN FEMALE, ESTROGEN RECEPTOR POSITIVE (HCC): ICD-10-CM

## 2023-11-13 RX ORDER — TAMOXIFEN CITRATE 20 MG/1
20 TABLET ORAL DAILY
Qty: 90 TABLET | Refills: 0 | Status: SHIPPED | OUTPATIENT
Start: 2023-11-13 | End: 2024-02-11

## 2023-12-19 NOTE — TELEPHONE ENCOUNTER
AVS from 9/29/21    rv in 3 months    RV scheduled 12/29/21 @ 8am    PT was given AVS and an appt schedule    Electronically signed by Félix Hernandez on 9/29/2021 at 11:38 AM H &P Colonoscopy  Patient:Britton Jennings                 :1973  (no) patient has seen Dr. Fredrick Vanessa prior to procedure  PCP: Tawnya Molina CNP     CC:Screening for colon cancer           HPI:     ROS:  All 12 systems negative except the positives in the HPI. Colonoscopy  Abd pain: no  Anemia: no  Bloating:no  Diarrhea: yes, at times  Constipation: no  Melena: no  Hematochezia:no  Rectal Bleeding:no  Rectal/Anal Pain:no  Pruritus: yes, at times when having diarrhea  Family history colon Cancer: no  Previous colon cancer: no  Previous Colon Polyp: no  Change in bowels: no  Decrease caliber of stool: no  Change in color of stool: no     Previous work up date: none         Family History   No family history on file. Social History               Socioeconomic History    Marital status:        Spouse name: Not on file    Number of children: Not on file    Years of education: Not on file    Highest education level: Not on file   Occupational History    Not on file   Tobacco Use    Smoking status: Never    Smokeless tobacco: Current       Types: Chew   Substance and Sexual Activity    Alcohol use: Yes       Comment: social    Drug use: No    Sexual activity: Not on file   Other Topics Concern    Not on file   Social History Narrative    Not on file      Social Determinants of Health          Financial Resource Strain: Low Risk     Difficulty of Paying Living Expenses: Not hard at all   Food Insecurity: No Food Insecurity    Worried About [place] in the Last Year: Never true    [address] in the Last Year: Never true   Transportation Needs: Unknown    Lack of Transportation (Medical): Not on file    Lack of Transportation (Non-Medical):  No   Physical Activity: Not on file   Stress: Not on file   Social Connections: Not on file   Intimate Partner Violence: Not on file   Housing Stability: Unknown    Unable to Pay for Housing in the Last Year: Not on file    Number of Places Lived in the

## 2024-02-07 ENCOUNTER — TELEPHONE (OUTPATIENT)
Dept: ONCOLOGY | Age: 54
End: 2024-02-07

## 2024-02-07 ENCOUNTER — OFFICE VISIT (OUTPATIENT)
Dept: ONCOLOGY | Age: 54
End: 2024-02-07
Payer: COMMERCIAL

## 2024-02-07 ENCOUNTER — HOSPITAL ENCOUNTER (OUTPATIENT)
Age: 54
Discharge: HOME | End: 2024-02-07
Payer: COMMERCIAL

## 2024-02-07 VITALS
HEART RATE: 73 BPM | BODY MASS INDEX: 22.85 KG/M2 | SYSTOLIC BLOOD PRESSURE: 117 MMHG | WEIGHT: 150.3 LBS | DIASTOLIC BLOOD PRESSURE: 78 MMHG | TEMPERATURE: 97.1 F

## 2024-02-07 DIAGNOSIS — Z17.0 MALIGNANT NEOPLASM OF UPPER-OUTER QUADRANT OF LEFT BREAST IN FEMALE, ESTROGEN RECEPTOR POSITIVE (HCC): Primary | ICD-10-CM

## 2024-02-07 DIAGNOSIS — Z80.3 FAMILY HISTORY OF BREAST CANCER: ICD-10-CM

## 2024-02-07 DIAGNOSIS — C50.412 MALIGNANT NEOPLASM OF UPPER-OUTER QUADRANT OF LEFT BREAST IN FEMALE, ESTROGEN RECEPTOR POSITIVE (HCC): ICD-10-CM

## 2024-02-07 DIAGNOSIS — Z17.0 MALIGNANT NEOPLASM OF UPPER-OUTER QUADRANT OF LEFT BREAST IN FEMALE, ESTROGEN RECEPTOR POSITIVE (HCC): ICD-10-CM

## 2024-02-07 DIAGNOSIS — D05.02 BREAST NEOPLASM, TIS (LCIS), LEFT: ICD-10-CM

## 2024-02-07 DIAGNOSIS — C50.412 MALIGNANT NEOPLASM OF UPPER-OUTER QUADRANT OF LEFT BREAST IN FEMALE, ESTROGEN RECEPTOR POSITIVE (HCC): Primary | ICD-10-CM

## 2024-02-07 LAB
ALBUMIN SERPL-MCNC: 4.4 G/DL (ref 3.5–5.2)
ALBUMIN/GLOB SERPL: 1.5 {RATIO} (ref 1–2.5)
ALP SERPL-CCNC: 148 U/L (ref 35–104)
ALT SERPL-CCNC: <5 U/L (ref 5–33)
ANION GAP SERPL CALCULATED.3IONS-SCNC: 10 MMOL/L (ref 9–17)
AST SERPL-CCNC: 24 U/L
BASOPHILS # BLD: 0.1 K/UL (ref 0–0.2)
BASOPHILS NFR BLD: 1 % (ref 0–2)
BILIRUB SERPL-MCNC: 0.2 MG/DL (ref 0.3–1.2)
BUN SERPL-MCNC: 18 MG/DL (ref 6–20)
CALCIUM SERPL-MCNC: 9.7 MG/DL (ref 8.6–10.4)
CHLORIDE SERPL-SCNC: 104 MMOL/L (ref 98–107)
CO2 SERPL-SCNC: 28 MMOL/L (ref 20–31)
CREAT SERPL-MCNC: 0.9 MG/DL (ref 0.5–0.9)
EOSINOPHIL # BLD: 0.4 K/UL (ref 0–0.4)
EOSINOPHILS RELATIVE PERCENT: 4 % (ref 1–4)
ERYTHROCYTE [DISTWIDTH] IN BLOOD BY AUTOMATED COUNT: 12.4 % (ref 12.5–15.4)
GFR SERPL CREATININE-BSD FRML MDRD: >60 ML/MIN/1.73M2
GLUCOSE SERPL-MCNC: 100 MG/DL (ref 70–99)
HCT VFR BLD AUTO: 39.5 % (ref 36–46)
HGB BLD-MCNC: 13.6 G/DL (ref 12–16)
LYMPHOCYTES NFR BLD: 2.1 K/UL (ref 1–4.8)
LYMPHOCYTES RELATIVE PERCENT: 27 % (ref 24–44)
MCH RBC QN AUTO: 32.1 PG (ref 26–34)
MCHC RBC AUTO-ENTMCNC: 34.4 G/DL (ref 31–37)
MCV RBC AUTO: 93.2 FL (ref 80–100)
MONOCYTES NFR BLD: 0.5 K/UL (ref 0.1–1.2)
MONOCYTES NFR BLD: 6 % (ref 2–11)
NEUTROPHILS NFR BLD: 62 % (ref 36–66)
NEUTS SEG NFR BLD: 4.9 K/UL (ref 1.8–7.7)
PLATELET # BLD AUTO: 261 K/UL (ref 140–450)
PMV BLD AUTO: 8.3 FL (ref 6–12)
POTASSIUM SERPL-SCNC: 4.4 MMOL/L (ref 3.7–5.3)
PROT SERPL-MCNC: 7.3 G/DL (ref 6.4–8.3)
RBC # BLD AUTO: 4.24 M/UL (ref 4–5.2)
SODIUM SERPL-SCNC: 142 MMOL/L (ref 135–144)
WBC OTHER # BLD: 7.9 K/UL (ref 3.5–11)

## 2024-02-07 PROCEDURE — 99211 OFF/OP EST MAY X REQ PHY/QHP: CPT | Performed by: INTERNAL MEDICINE

## 2024-02-07 PROCEDURE — 85025 COMPLETE CBC W/AUTO DIFF WBC: CPT

## 2024-02-07 PROCEDURE — 36415 COLL VENOUS BLD VENIPUNCTURE: CPT

## 2024-02-07 PROCEDURE — 80053 COMPREHEN METABOLIC PANEL: CPT

## 2024-02-07 RX ORDER — TAMOXIFEN CITRATE 20 MG/1
20 TABLET ORAL DAILY
Qty: 90 TABLET | Refills: 1 | Status: SHIPPED | OUTPATIENT
Start: 2024-02-07 | End: 2024-08-05

## 2024-02-07 RX ORDER — TAMOXIFEN CITRATE 20 MG/1
20 TABLET ORAL DAILY
Qty: 90 TABLET | Refills: 1 | Status: SHIPPED | OUTPATIENT
Start: 2024-02-07 | End: 2024-02-07

## 2024-02-07 NOTE — PROGRESS NOTES
prognosis, risks and goals of therapy and answered all the patients questions to the best of my ability.  Patient expressed understanding and was in agreement.    DINA MOURA MD              This note is created with the assistance of a speech recognition program.  While intending to generate a document that actually reflects the content of the visit, the document can still have some errors including those of syntax and sound a like substitutions which may escape proof reading.  It such instances, actual meaning can be extrapolated by contextual diversion.

## 2024-02-07 NOTE — TELEPHONE ENCOUNTER
Rv in 6 months   Labs now   Mamogram in April 2024               RV scheduled for 8/7/24 @ 3pm    Labs being drawn now    Gave pt mammogram order and scheduling number to schedule mamm for after 4/13    An After Visit Summary was printed and given to the patient.    Electronically signed by Petra Carballo on 2/7/2024 at 3:25 PM

## 2024-02-13 ENCOUNTER — TELEPHONE (OUTPATIENT)
Dept: INFUSION THERAPY | Age: 54
End: 2024-02-13

## 2024-02-13 NOTE — TELEPHONE ENCOUNTER
Patient called concerned regarding labs.  Is there anything to be worried about with the alk phos.  Please call her with recommendations at [phone]

## 2024-04-03 ENCOUNTER — HOSPITAL ENCOUNTER (OUTPATIENT)
Age: 54
Setting detail: SPECIMEN
Discharge: HOME | End: 2024-04-03

## 2024-04-03 DIAGNOSIS — R43.8 METALLIC TASTE: ICD-10-CM

## 2024-04-03 LAB
CERULOPLASMIN SERPL-MCNC: 36 MG/DL (ref 16–45)
IRON SATN MFR SERPL: 37 % (ref 20–55)
IRON SERPL-MCNC: 121 UG/DL (ref 37–145)
TIBC SERPL-MCNC: 324 UG/DL (ref 250–450)
UNSATURATED IRON BINDING CAPACITY: 203 UG/DL (ref 112–347)

## 2024-04-03 NOTE — RESULT ENCOUNTER NOTE
Tere - your copper level and iron are normal. As we discussed in your visit, brush and floss twice a day for the next two weeks to see if that helps the metallic taste in your mouth.

## 2024-04-15 ENCOUNTER — HOSPITAL ENCOUNTER (OUTPATIENT)
Dept: WOMENS IMAGING | Age: 54
Discharge: HOME | End: 2024-04-17
Attending: INTERNAL MEDICINE
Payer: COMMERCIAL

## 2024-04-15 VITALS — BODY MASS INDEX: 22.28 KG/M2 | WEIGHT: 147 LBS | HEIGHT: 68 IN

## 2024-04-15 DIAGNOSIS — D05.02 BREAST NEOPLASM, TIS (LCIS), LEFT: ICD-10-CM

## 2024-04-15 DIAGNOSIS — C50.412 MALIGNANT NEOPLASM OF UPPER-OUTER QUADRANT OF LEFT BREAST IN FEMALE, ESTROGEN RECEPTOR POSITIVE (HCC): ICD-10-CM

## 2024-04-15 DIAGNOSIS — Z17.0 MALIGNANT NEOPLASM OF UPPER-OUTER QUADRANT OF LEFT BREAST IN FEMALE, ESTROGEN RECEPTOR POSITIVE (HCC): ICD-10-CM

## 2024-04-15 DIAGNOSIS — Z80.3 FAMILY HISTORY OF BREAST CANCER: ICD-10-CM

## 2024-04-15 PROCEDURE — 77063 BREAST TOMOSYNTHESIS BI: CPT

## 2024-08-07 ENCOUNTER — OFFICE VISIT (OUTPATIENT)
Dept: ONCOLOGY | Age: 54
End: 2024-08-07
Payer: COMMERCIAL

## 2024-08-07 ENCOUNTER — TELEPHONE (OUTPATIENT)
Dept: ONCOLOGY | Age: 54
End: 2024-08-07

## 2024-08-07 VITALS
HEART RATE: 58 BPM | BODY MASS INDEX: 22.52 KG/M2 | DIASTOLIC BLOOD PRESSURE: 77 MMHG | WEIGHT: 148.1 LBS | RESPIRATION RATE: 18 BRPM | TEMPERATURE: 96.9 F | OXYGEN SATURATION: 100 % | SYSTOLIC BLOOD PRESSURE: 112 MMHG

## 2024-08-07 DIAGNOSIS — Z17.0 MALIGNANT NEOPLASM OF UPPER-OUTER QUADRANT OF LEFT BREAST IN FEMALE, ESTROGEN RECEPTOR POSITIVE (HCC): Primary | ICD-10-CM

## 2024-08-07 DIAGNOSIS — Z80.3 FAMILY HISTORY OF BREAST CANCER: ICD-10-CM

## 2024-08-07 DIAGNOSIS — C50.412 MALIGNANT NEOPLASM OF UPPER-OUTER QUADRANT OF LEFT BREAST IN FEMALE, ESTROGEN RECEPTOR POSITIVE (HCC): Primary | ICD-10-CM

## 2024-08-07 DIAGNOSIS — D05.02 BREAST NEOPLASM, TIS (LCIS), LEFT: ICD-10-CM

## 2024-08-07 PROCEDURE — 99211 OFF/OP EST MAY X REQ PHY/QHP: CPT | Performed by: INTERNAL MEDICINE

## 2024-08-07 PROCEDURE — 99214 OFFICE O/P EST MOD 30 MIN: CPT | Performed by: INTERNAL MEDICINE

## 2024-08-07 RX ORDER — TAMOXIFEN CITRATE 20 MG/1
20 TABLET ORAL DAILY
Qty: 90 TABLET | Refills: 1 | Status: SHIPPED | OUTPATIENT
Start: 2024-08-07 | End: 2025-02-03

## 2024-08-07 NOTE — TELEPHONE ENCOUNTER
Instructions   from Dr. Syed Marques MD    Dexa in November -dec  Rv in 6 months           Patient will call to schedule Dexa scan, scheduling number provided  RV 2/5/25 at 3 pm.

## 2024-08-07 NOTE — PROGRESS NOTES
menarche was 12 years.  Age of first child was 26.  Patient has 2 children.  Patient is premenopausal.  She is not on birth control or hormonal medication.  Does not smoke.  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  Patient has history of breast cancer in her mother at age of 62.    Interim History:  Patient presents to the clinic for follow-up visit and to discuss results of her lab workup and other abnormal clinical data.  Patient overall tolerating tamoxifen without unexpected or severe side effects. Denies hospitalization or ER visit.  Appetite is good.  Weight is stable.  Nausea is controlled.  Up-to-date on mammograms    During this visit patient's allergy, social, medical, surgical history and medications were reviewed and updated.    Past Medical History:   Denies history of hypertension diabetes mellitus    Past Surgical History:     Past Surgical History:   Procedure Laterality Date    BREAST BIOPSY Left 7/20/2021    LEFT BREAST LUMPECTOMY    SENTINEL LYMPH NODE BIOPSY  (@ 07:30 )  WITH FROZEN SECTION performed by Deepa Jose DO at [facility] OR    BREAST RECONSTRUCTION Left 7/20/2021    LEFT BREAST ONCOPLASTIC RECONSTRUCTION   PECT BLOCK performed by Latisha Velasquez MD at [facility] OR    MRI BREAST BX USING DEVICE LEFT Left 7/1/2021    MRI BREAST BX USING DEVICE LEFT 7/1/2021 [facility] MRI    US BREAST BIOPSY W LOC DEVICE 1ST LESION LEFT Left 6/3/2021    US BREAST NEEDLE BIOPSY LEFT 6/3/2021 [facility]    US LYMPH NODE BIOPSY  6/3/2021    US LYMPH NODE BIOPSY 6/3/2021 [facility]    WISDOM TOOTH EXTRACTION         Patient Family Social History:    Family History   Problem Relation Age of Onset    Other Father         Gout    Breast Cancer Mother 64        ductal carcinoma lumpectomy w/ radiation    Migraines Mother     No Known Problems Brother     Breast Cancer Paternal Grandmother 80    Heart Attack Maternal Grandfather 75     Social History     Socioeconomic History    Marital status:

## 2024-08-08 ENCOUNTER — HOSPITAL ENCOUNTER (OUTPATIENT)
Age: 54
Setting detail: SPECIMEN
Discharge: HOME | End: 2024-08-08

## 2024-08-08 ENCOUNTER — OFFICE VISIT (OUTPATIENT)
Dept: OBGYN CLINIC | Age: 54
End: 2024-08-08

## 2024-08-08 VITALS
SYSTOLIC BLOOD PRESSURE: 106 MMHG | BODY MASS INDEX: 22.28 KG/M2 | WEIGHT: 147 LBS | DIASTOLIC BLOOD PRESSURE: 64 MMHG | HEIGHT: 68 IN

## 2024-08-08 DIAGNOSIS — Z79.810 USE OF TAMOXIFEN (NOLVADEX): ICD-10-CM

## 2024-08-08 DIAGNOSIS — Z01.419 WELL FEMALE EXAM WITH ROUTINE GYNECOLOGICAL EXAM: Primary | ICD-10-CM

## 2024-08-08 DIAGNOSIS — Z11.51 SPECIAL SCREENING EXAMINATION FOR HUMAN PAPILLOMAVIRUS (HPV): ICD-10-CM

## 2024-08-08 DIAGNOSIS — N83.202 LEFT OVARIAN CYST: ICD-10-CM

## 2024-08-08 DIAGNOSIS — C50.412 MALIGNANT NEOPLASM OF UPPER-OUTER QUADRANT OF LEFT BREAST IN FEMALE, ESTROGEN RECEPTOR POSITIVE (HCC): ICD-10-CM

## 2024-08-08 DIAGNOSIS — Z12.31 ENCOUNTER FOR SCREENING MAMMOGRAM FOR MALIGNANT NEOPLASM OF BREAST: ICD-10-CM

## 2024-08-08 DIAGNOSIS — Z17.0 MALIGNANT NEOPLASM OF UPPER-OUTER QUADRANT OF LEFT BREAST IN FEMALE, ESTROGEN RECEPTOR POSITIVE (HCC): ICD-10-CM

## 2024-08-08 NOTE — PROGRESS NOTES
[id number]  Specimen #[id number]  Source:  1: Cervical material, (ThinPrep vial, Imaging-assisted review)    Clinical History  Z01.419 Routine gyn exam without abnormal findings  Z11.51 Encounter for screening for HPV  Co-Test:  ThinPrep Pap with high risk HPV testing  LMP:  3/31/19  INTERPRETATION    Cervical material, (ThinPrep vial, Imaging-assisted review):  Specimen Adequacy:       Satisfactory for evaluation.       - Endocervical/transformation zone component present.  Descriptive Diagnosis:       Negative for intraepithelial lesion or malignancy.   Comments:       High Risk HPV testing was ordered.      Cytotechnologist:   EDDIE Chambers CD(ASCP)  **Electronically Signed Out**  faviola/4/18/2019          Procedure/Addendum  HPV Procedure Report     Date Ordered:     4/11/2019     Status:  Signed Out       Date Complete:     4/11/2019     By: EDDIE Cordoba(ASCP)       Date Reported:     4/18/2019       INTERPRETATION  Roche HPV DNA High Risk                                  HPV Sample               Thin Prep                    (Ref Range)  HPV Type 16               Not Detected                    (Not  Detected)  HPV Type 18               Not Detected                    (Not  Detected)  Other High Risk HPV     Not Detected                    (Not Detected)       This test amplifies and detects DNA of 14 high-risk HPV types  associated with cervical cancer and its precursor lesions (HPV types  16, 18, 31, 33, 35, 39, 45, 51, 52, 56, 58, 59, 66, and 68).   Sensitivity may be affected by specimen collection methods, stage of  infection, and the presence of interfering substances.  Results should  be interpreted in conjunction with other available laboratory and  clinical data.  A negative high-risk HPV result does not exclude the  possibility of future cytologic HSIL or underlying CIN2-3 or cancer.  This test is intended for medical purposes only and is not valid for  the evaluation of suspected sexual abuse or for other

## 2024-08-10 LAB
HPV SAMPLE: NORMAL
HPV, INTERPRETATION: NORMAL
HPV16 DNA CVX QL NAA+PROBE: NOT DETECTED
HPV18 DNA CVX QL NAA+PROBE: NOT DETECTED
SPECIMEN DESCRIPTION: NORMAL

## 2024-08-15 LAB — CYTOLOGY REPORT: NORMAL

## 2024-09-18 ENCOUNTER — OFFICE VISIT (OUTPATIENT)
Dept: OBGYN CLINIC | Age: 54
End: 2024-09-18
Payer: COMMERCIAL

## 2024-09-18 VITALS
SYSTOLIC BLOOD PRESSURE: 104 MMHG | BODY MASS INDEX: 22.58 KG/M2 | DIASTOLIC BLOOD PRESSURE: 62 MMHG | HEIGHT: 68 IN | WEIGHT: 149 LBS

## 2024-09-18 DIAGNOSIS — C50.412 MALIGNANT NEOPLASM OF UPPER-OUTER QUADRANT OF LEFT BREAST IN FEMALE, ESTROGEN RECEPTOR POSITIVE (HCC): ICD-10-CM

## 2024-09-18 DIAGNOSIS — N83.202 LEFT OVARIAN CYST: Primary | ICD-10-CM

## 2024-09-18 DIAGNOSIS — Z79.810 USE OF TAMOXIFEN (NOLVADEX): ICD-10-CM

## 2024-09-18 DIAGNOSIS — Z17.0 MALIGNANT NEOPLASM OF UPPER-OUTER QUADRANT OF LEFT BREAST IN FEMALE, ESTROGEN RECEPTOR POSITIVE (HCC): ICD-10-CM

## 2024-09-18 PROCEDURE — 99213 OFFICE O/P EST LOW 20 MIN: CPT | Performed by: OBSTETRICS & GYNECOLOGY

## 2024-09-18 RX ORDER — AMMONIUM LACTATE 12 G/100G
LOTION TOPICAL
COMMUNITY
Start: 2024-09-04

## 2024-11-25 ENCOUNTER — HOSPITAL ENCOUNTER (OUTPATIENT)
Dept: WOMENS IMAGING | Age: 54
Discharge: HOME | End: 2024-11-27
Attending: INTERNAL MEDICINE
Payer: COMMERCIAL

## 2024-11-25 DIAGNOSIS — C50.412 MALIGNANT NEOPLASM OF UPPER-OUTER QUADRANT OF LEFT BREAST IN FEMALE, ESTROGEN RECEPTOR POSITIVE (HCC): ICD-10-CM

## 2024-11-25 DIAGNOSIS — Z17.0 MALIGNANT NEOPLASM OF UPPER-OUTER QUADRANT OF LEFT BREAST IN FEMALE, ESTROGEN RECEPTOR POSITIVE (HCC): ICD-10-CM

## 2024-11-25 DIAGNOSIS — Z80.3 FAMILY HISTORY OF BREAST CANCER: ICD-10-CM

## 2024-11-25 DIAGNOSIS — D05.02 BREAST NEOPLASM, TIS (LCIS), LEFT: ICD-10-CM

## 2024-11-25 PROCEDURE — 77080 DXA BONE DENSITY AXIAL: CPT

## 2025-02-05 ENCOUNTER — OFFICE VISIT (OUTPATIENT)
Dept: ONCOLOGY | Age: 55
End: 2025-02-05
Payer: COMMERCIAL

## 2025-02-05 ENCOUNTER — TELEPHONE (OUTPATIENT)
Dept: ONCOLOGY | Age: 55
End: 2025-02-05

## 2025-02-05 VITALS
BODY MASS INDEX: 23.42 KG/M2 | HEART RATE: 56 BPM | TEMPERATURE: 97.3 F | RESPIRATION RATE: 18 BRPM | WEIGHT: 154 LBS | OXYGEN SATURATION: 100 % | DIASTOLIC BLOOD PRESSURE: 80 MMHG | SYSTOLIC BLOOD PRESSURE: 121 MMHG

## 2025-02-05 DIAGNOSIS — C50.412 MALIGNANT NEOPLASM OF UPPER-OUTER QUADRANT OF LEFT BREAST IN FEMALE, ESTROGEN RECEPTOR POSITIVE (HCC): Primary | ICD-10-CM

## 2025-02-05 DIAGNOSIS — M85.80 OSTEOPENIA, UNSPECIFIED LOCATION: ICD-10-CM

## 2025-02-05 DIAGNOSIS — D05.02 BREAST NEOPLASM, TIS (LCIS), LEFT: ICD-10-CM

## 2025-02-05 DIAGNOSIS — Z17.0 MALIGNANT NEOPLASM OF UPPER-OUTER QUADRANT OF LEFT BREAST IN FEMALE, ESTROGEN RECEPTOR POSITIVE (HCC): Primary | ICD-10-CM

## 2025-02-05 DIAGNOSIS — Z80.3 FAMILY HISTORY OF BREAST CANCER: ICD-10-CM

## 2025-02-05 PROCEDURE — 99211 OFF/OP EST MAY X REQ PHY/QHP: CPT | Performed by: INTERNAL MEDICINE

## 2025-02-05 RX ORDER — TAMOXIFEN CITRATE 20 MG/1
20 TABLET ORAL DAILY
Qty: 90 TABLET | Refills: 1 | Status: SHIPPED | OUTPATIENT
Start: 2025-02-05 | End: 2025-08-04

## 2025-02-05 NOTE — TELEPHONE ENCOUNTER
Instructions   from Dr. Syed Marques MD    RV IN 6 MONTHS     Rv scheduled on 8/13/2025 @10:15; pt did not want their AVS

## 2025-02-05 NOTE — PROGRESS NOTES
Tere Hayes                                                                                                                  2/5/2025  MRN:   [id number]  YOB: 1970  PCP:                           Abbi Unger MD  Referring Physician: No ref. provider found  Treating Physician Name: DINA MOURA MD      Reason for visit:  Chief Complaint   Patient presents with    Follow-up     REVIEW STATUS OF DISEASE     Results     DEXA       Current problems:  Invasive lobular carcinoma of left breast, grade 2, ER positive strongly, MO positive moderate, HER-2 not amplified, clinical stage T1c, N0, M0.  Pathological stage T1 cN1 aM0  Oncotype recurrence score of 15  Left breast LCIS  Postmenopausal status confirmed-10/2022, repeat estradiol came back elevated consistent with premenopausal status    Active and recent treatments:  Left breast lumpectomy with sentinel lymph node biopsy-7/2021  S/p adjuvant radiation therapy--, completed 9/2021  Tamoxifen--9/2021, discontinued 09/2022  Femara-11/20/2022 through 1/20/2023(discontinued due to side effects)  Switched back to tamoxifen-5/2023    Summary of Case/History:    Tere Hayes a 54 y.o.female is a patient recently diagnosed with left breast lobular invasive cancer presents to the clinic to establish care and for further work-up and evaluation.  Patient last mammogram done about 2 years ago was unremarkable but her latest mammogram showed asymmetrical density in the upper outer quadrant at 1230-1 o'clock position.  This was further confirmed with ultrasound which showed a 1.7 cm x 1.3 cm mass.  Patient underwent biopsy which confirmed a lobular cancer, grade 2.  Ultrasound also showed suspicious lymph node however biopsy was negative for malignancy.  Patient herself did not notice any palpable mass.  Denies any breast pain nipple discharge denies any previous history of biopsy.  Patient is premenopausal.    Patient age of menarche was 12

## 2025-04-18 ENCOUNTER — RESULTS FOLLOW-UP (OUTPATIENT)
Dept: OBGYN CLINIC | Age: 55
End: 2025-04-18

## 2025-04-18 ENCOUNTER — HOSPITAL ENCOUNTER (OUTPATIENT)
Dept: WOMENS IMAGING | Age: 55
Discharge: HOME | End: 2025-04-20
Payer: COMMERCIAL

## 2025-04-18 VITALS — BODY MASS INDEX: 23.34 KG/M2 | WEIGHT: 154 LBS | HEIGHT: 68 IN

## 2025-04-18 DIAGNOSIS — Z12.31 ENCOUNTER FOR SCREENING MAMMOGRAM FOR MALIGNANT NEOPLASM OF BREAST: ICD-10-CM

## 2025-04-18 DIAGNOSIS — Z17.0 MALIGNANT NEOPLASM OF UPPER-OUTER QUADRANT OF LEFT BREAST IN FEMALE, ESTROGEN RECEPTOR POSITIVE (HCC): ICD-10-CM

## 2025-04-18 DIAGNOSIS — C50.412 MALIGNANT NEOPLASM OF UPPER-OUTER QUADRANT OF LEFT BREAST IN FEMALE, ESTROGEN RECEPTOR POSITIVE (HCC): ICD-10-CM

## 2025-04-18 PROCEDURE — 77063 BREAST TOMOSYNTHESIS BI: CPT

## 2025-06-12 DIAGNOSIS — M85.80 OSTEOPENIA, UNSPECIFIED LOCATION: ICD-10-CM

## 2025-06-12 DIAGNOSIS — C50.412 MALIGNANT NEOPLASM OF UPPER-OUTER QUADRANT OF LEFT BREAST IN FEMALE, ESTROGEN RECEPTOR POSITIVE (HCC): ICD-10-CM

## 2025-06-12 DIAGNOSIS — Z17.0 MALIGNANT NEOPLASM OF UPPER-OUTER QUADRANT OF LEFT BREAST IN FEMALE, ESTROGEN RECEPTOR POSITIVE (HCC): ICD-10-CM

## 2025-06-12 DIAGNOSIS — Z80.3 FAMILY HISTORY OF BREAST CANCER: ICD-10-CM

## 2025-06-12 DIAGNOSIS — D05.02 BREAST NEOPLASM, TIS (LCIS), LEFT: ICD-10-CM

## 2025-06-12 RX ORDER — TAMOXIFEN CITRATE 20 MG/1
20 TABLET ORAL DAILY
Qty: 90 TABLET | Refills: 1 | Status: SHIPPED | OUTPATIENT
Start: 2025-06-12 | End: 2025-12-09

## 2025-08-13 ENCOUNTER — OFFICE VISIT (OUTPATIENT)
Dept: OBGYN CLINIC | Age: 55
End: 2025-08-13
Payer: COMMERCIAL

## 2025-08-13 ENCOUNTER — OFFICE VISIT (OUTPATIENT)
Age: 55
End: 2025-08-13

## 2025-08-13 VITALS
DIASTOLIC BLOOD PRESSURE: 60 MMHG | BODY MASS INDEX: 23.49 KG/M2 | WEIGHT: 155 LBS | HEIGHT: 68 IN | SYSTOLIC BLOOD PRESSURE: 108 MMHG

## 2025-08-13 VITALS
HEART RATE: 59 BPM | OXYGEN SATURATION: 100 % | TEMPERATURE: 98 F | BODY MASS INDEX: 23.66 KG/M2 | SYSTOLIC BLOOD PRESSURE: 107 MMHG | WEIGHT: 155.6 LBS | DIASTOLIC BLOOD PRESSURE: 70 MMHG

## 2025-08-13 DIAGNOSIS — M85.80 OSTEOPENIA, UNSPECIFIED LOCATION: ICD-10-CM

## 2025-08-13 DIAGNOSIS — D05.02 BREAST NEOPLASM, TIS (LCIS), LEFT: ICD-10-CM

## 2025-08-13 DIAGNOSIS — Z80.3 FAMILY HISTORY OF BREAST CANCER: ICD-10-CM

## 2025-08-13 DIAGNOSIS — C50.412 MALIGNANT NEOPLASM OF UPPER-OUTER QUADRANT OF LEFT BREAST IN FEMALE, ESTROGEN RECEPTOR POSITIVE (HCC): ICD-10-CM

## 2025-08-13 DIAGNOSIS — Z17.0 MALIGNANT NEOPLASM OF UPPER-OUTER QUADRANT OF LEFT BREAST IN FEMALE, ESTROGEN RECEPTOR POSITIVE (HCC): ICD-10-CM

## 2025-08-13 DIAGNOSIS — Z79.810 USE OF TAMOXIFEN (NOLVADEX): ICD-10-CM

## 2025-08-13 DIAGNOSIS — R23.2 HOT FLASHES: ICD-10-CM

## 2025-08-13 DIAGNOSIS — Z01.419 WELL FEMALE EXAM WITH ROUTINE GYNECOLOGICAL EXAM: Primary | ICD-10-CM

## 2025-08-13 DIAGNOSIS — F32.A DEPRESSION, UNSPECIFIED DEPRESSION TYPE: ICD-10-CM

## 2025-08-13 PROCEDURE — 99396 PREV VISIT EST AGE 40-64: CPT | Performed by: NURSE PRACTITIONER

## 2025-08-13 PROCEDURE — 99213 OFFICE O/P EST LOW 20 MIN: CPT | Performed by: NURSE PRACTITIONER

## 2025-08-13 RX ORDER — TAMOXIFEN CITRATE 20 MG/1
20 TABLET ORAL DAILY
Qty: 90 TABLET | Refills: 1 | Status: SHIPPED | OUTPATIENT
Start: 2025-08-13 | End: 2026-02-09

## 2025-08-26 ENCOUNTER — RESULTS FOLLOW-UP (OUTPATIENT)
Dept: OBGYN CLINIC | Age: 55
End: 2025-08-26

## 2025-09-03 ENCOUNTER — TELEPHONE (OUTPATIENT)
Dept: OBGYN CLINIC | Age: 55
End: 2025-09-03

## (undated) DEVICE — GAUZE,SPONGE,4"X4",16PLY,XRAY,STRL,LF: Brand: MEDLINE

## (undated) DEVICE — SURGICAL PROCEDURE KIT BASIN MAJ SET UP

## (undated) DEVICE — NEEDLE HYPO 25GA L1.5IN BLU POLYPR HUB S STL REG BVL STR

## (undated) DEVICE — SUTURE PERMAHAND SZ 3-0 L30IN NONABSORBABLE BLK SH L26MM K832H

## (undated) DEVICE — SOLUTION IV 1000ML 0.9% SOD CHL PH 5 INJ USP VIAFLX PLAS

## (undated) DEVICE — INTENDED FOR TISSUE SEPARATION, AND OTHER PROCEDURES THAT REQUIRE A SHARP SURGICAL BLADE TO PUNCTURE OR CUT.: Brand: BARD-PARKER ® CARBON RIB-BACK BLADES

## (undated) DEVICE — SYRINGE, LUER LOCK, 10ML: Brand: MEDLINE

## (undated) DEVICE — SKIN PREP TRAY W/CHG: Brand: MEDLINE INDUSTRIES, INC.

## (undated) DEVICE — SUTURE VCRL SZ 2-0 L36IN ABSRB UD L36MM CT-1 1/2 CIR J945H

## (undated) DEVICE — GLOVE SURG SZ 65 CRM LTX FREE POLYISOPRENE POLYMER BEAD ANTI

## (undated) DEVICE — SUTURE V-LOC 90 3-0 L9IN ABSRB VLT L26MM V-20 1/2 CIR TAPR VLOCM0644

## (undated) DEVICE — GAUZE,SPONGE,FLUFF,6"X6.75",STRL,5/TRAY: Brand: MEDLINE

## (undated) DEVICE — SUTURE MCRYL SZ 2-0 L27IN ABSRB UD SH L26MM TAPERPOINT NDL Y417H

## (undated) DEVICE — ADHESIVE SKIN CLSR 0.7ML TOP DERMBND ADV

## (undated) DEVICE — TOWEL,OR,DSP,ST,BLUE,DLX,XR,4/PK,20PK/CS: Brand: MEDLINE

## (undated) DEVICE — DRAPE,REIN 53X77,STERILE: Brand: MEDLINE

## (undated) DEVICE — GARMENT,MEDLINE,DVT,INT,CALF,MED, GEN2: Brand: MEDLINE

## (undated) DEVICE — SPONGE LAP W18XL18IN WHT COT 4 PLY FLD STRUNG RADPQ DISP ST

## (undated) DEVICE — SYRINGE MED 50ML LUERLOCK TIP

## (undated) DEVICE — TUBING, SUCTION, 1/4" X 12', STRAIGHT: Brand: MEDLINE

## (undated) DEVICE — GLOVE SURG 6 11.1IN BEAD CUF LIGHT BRN SENSICARE LTX FREE

## (undated) DEVICE — POST-SURG COMPRESSION BRA CURAD MD

## (undated) DEVICE — YANKAUER,FLEXIBLE HANDLE,REGLR CAPACITY: Brand: MEDLINE INDUSTRIES, INC.

## (undated) DEVICE — ELECTRODE PT RET AD L9FT HI MOIST COND ADH HYDRGEL CORDED

## (undated) DEVICE — SUTURE PROL SZ 0 L30IN NONABSORBABLE BLU SH L26MM 1/2 CIR 8834H

## (undated) DEVICE — PROVE COVER: Brand: UNBRANDED

## (undated) DEVICE — PREP-RESISTANT MARKER W/ RULER: Brand: MEDLINE INDUSTRIES, INC.

## (undated) DEVICE — RESERVOIR,SUCTION,100CC,SILICONE: Brand: MEDLINE

## (undated) DEVICE — 2DE14 3-0 UNDYD MONODERM 30X30: Brand: 2DE14 3-0 UNDYD MONODERM 30X30

## (undated) DEVICE — SUTURE NONABSORBABLE MONOFILAMENT 2-0 FS 18 IN ETHILON 664H

## (undated) DEVICE — SUTURE VCRL SZ 3-0 L27IN ABSRB UD L26MM SH 1/2 CIR J416H

## (undated) DEVICE — SUTURE MCRYL SZ 5-0 L18IN ABSRB UD PC-3 L16MM 3/8 CIR Y844G

## (undated) DEVICE — DRAIN RND CHN 10FR 1/8 IN FULL FLUT

## (undated) DEVICE — BLANKET WRM W40.2XL55.9IN IORT LO BODY + MISTRAL AIR

## (undated) DEVICE — SUTURE PROL SZ 3-0 L30IN NONABSORBABLE BLU L26MM SH 1/2 CIR 8832H

## (undated) DEVICE — DECANTER FLD 9IN ST BG FOR ASEP TRNSF OF FLD

## (undated) DEVICE — DRAPE IRRIG FLD WRM W44XL44IN W/ AORN STD PRTBL INTRATEMP

## (undated) DEVICE — BLADE ES ELASTOMERIC COAT INSUL DURABLE BEND UPTO 90DEG

## (undated) DEVICE — SUTURE PERMA-HAND SZ 2-0 L30IN NONABSORBABLE BLK L26MM SH K833H

## (undated) DEVICE — TOTAL TRAY, DB, 100% SILI FOLEY, 16FR 10: Brand: MEDLINE